# Patient Record
Sex: MALE | Race: WHITE | Employment: FULL TIME | URBAN - NONMETROPOLITAN AREA
[De-identification: names, ages, dates, MRNs, and addresses within clinical notes are randomized per-mention and may not be internally consistent; named-entity substitution may affect disease eponyms.]

---

## 2019-03-27 ENCOUNTER — APPOINTMENT (OUTPATIENT)
Dept: CT IMAGING | Age: 54
End: 2019-03-27
Payer: COMMERCIAL

## 2019-03-27 ENCOUNTER — HOSPITAL ENCOUNTER (EMERGENCY)
Age: 54
Discharge: ANOTHER HOSPITAL | End: 2019-03-28
Attending: EMERGENCY MEDICINE
Payer: COMMERCIAL

## 2019-03-27 DIAGNOSIS — G45.9 TIA (TRANSIENT ISCHEMIC ATTACK): Primary | ICD-10-CM

## 2019-03-27 LAB
ABSOLUTE EOS #: 0.13 K/UL (ref 0–0.44)
ABSOLUTE IMMATURE GRANULOCYTE: 0.03 K/UL (ref 0–0.3)
ABSOLUTE LYMPH #: 2.22 K/UL (ref 1.1–3.7)
ABSOLUTE MONO #: 0.53 K/UL (ref 0.1–1.2)
ALBUMIN SERPL-MCNC: 4.4 G/DL (ref 3.5–5.2)
ALBUMIN/GLOBULIN RATIO: 1.3 (ref 1–2.5)
ALP BLD-CCNC: 73 U/L (ref 40–129)
ALT SERPL-CCNC: 23 U/L (ref 5–41)
ANION GAP SERPL CALCULATED.3IONS-SCNC: 11 MMOL/L (ref 9–17)
AST SERPL-CCNC: 17 U/L
BASOPHILS # BLD: 1 % (ref 0–2)
BASOPHILS ABSOLUTE: 0.04 K/UL (ref 0–0.2)
BILIRUB SERPL-MCNC: 0.25 MG/DL (ref 0.3–1.2)
BUN BLDV-MCNC: 22 MG/DL (ref 6–20)
BUN/CREAT BLD: 24 (ref 9–20)
CALCIUM SERPL-MCNC: 10 MG/DL (ref 8.6–10.4)
CHLORIDE BLD-SCNC: 93 MMOL/L (ref 98–107)
CO2: 29 MMOL/L (ref 20–31)
CREAT SERPL-MCNC: 0.91 MG/DL (ref 0.7–1.2)
DIFFERENTIAL TYPE: NORMAL
EOSINOPHILS RELATIVE PERCENT: 2 % (ref 1–4)
GFR AFRICAN AMERICAN: >60 ML/MIN
GFR NON-AFRICAN AMERICAN: >60 ML/MIN
GFR SERPL CREATININE-BSD FRML MDRD: ABNORMAL ML/MIN/{1.73_M2}
GFR SERPL CREATININE-BSD FRML MDRD: ABNORMAL ML/MIN/{1.73_M2}
GLUCOSE BLD-MCNC: 206 MG/DL (ref 70–99)
HCT VFR BLD CALC: 43.3 % (ref 40.7–50.3)
HEMOGLOBIN: 14 G/DL (ref 13–17)
IMMATURE GRANULOCYTES: 0 %
LYMPHOCYTES # BLD: 30 % (ref 24–43)
MCH RBC QN AUTO: 29.4 PG (ref 25.2–33.5)
MCHC RBC AUTO-ENTMCNC: 32.3 G/DL (ref 28.4–34.8)
MCV RBC AUTO: 90.8 FL (ref 82.6–102.9)
MONOCYTES # BLD: 7 % (ref 3–12)
NRBC AUTOMATED: 0 PER 100 WBC
PDW BLD-RTO: 12.5 % (ref 11.8–14.4)
PLATELET # BLD: 225 K/UL (ref 138–453)
PLATELET ESTIMATE: NORMAL
PMV BLD AUTO: 10.5 FL (ref 8.1–13.5)
POTASSIUM SERPL-SCNC: 4.6 MMOL/L (ref 3.7–5.3)
RBC # BLD: 4.77 M/UL (ref 4.21–5.77)
RBC # BLD: NORMAL 10*6/UL
SEG NEUTROPHILS: 60 % (ref 36–65)
SEGMENTED NEUTROPHILS ABSOLUTE COUNT: 4.56 K/UL (ref 1.5–8.1)
SODIUM BLD-SCNC: 133 MMOL/L (ref 135–144)
TOTAL PROTEIN: 7.7 G/DL (ref 6.4–8.3)
TROPONIN INTERP: NORMAL
TROPONIN INTERP: NORMAL
TROPONIN T: <0.03 NG/ML
TROPONIN T: <0.03 NG/ML
TROPONIN, HIGH SENSITIVITY: NORMAL NG/L (ref 0–22)
TROPONIN, HIGH SENSITIVITY: NORMAL NG/L (ref 0–22)
WBC # BLD: 7.5 K/UL (ref 3.5–11.3)
WBC # BLD: NORMAL 10*3/UL

## 2019-03-27 PROCEDURE — 80053 COMPREHEN METABOLIC PANEL: CPT

## 2019-03-27 PROCEDURE — 6360000004 HC RX CONTRAST MEDICATION: Performed by: EMERGENCY MEDICINE

## 2019-03-27 PROCEDURE — 99285 EMERGENCY DEPT VISIT HI MDM: CPT

## 2019-03-27 PROCEDURE — 36415 COLL VENOUS BLD VENIPUNCTURE: CPT

## 2019-03-27 PROCEDURE — 70496 CT ANGIOGRAPHY HEAD: CPT

## 2019-03-27 PROCEDURE — 84484 ASSAY OF TROPONIN QUANT: CPT

## 2019-03-27 PROCEDURE — 93005 ELECTROCARDIOGRAM TRACING: CPT

## 2019-03-27 PROCEDURE — 85025 COMPLETE CBC W/AUTO DIFF WBC: CPT

## 2019-03-27 PROCEDURE — 6370000000 HC RX 637 (ALT 250 FOR IP): Performed by: EMERGENCY MEDICINE

## 2019-03-27 PROCEDURE — 70498 CT ANGIOGRAPHY NECK: CPT

## 2019-03-27 RX ORDER — ASPIRIN 81 MG/1
324 TABLET, CHEWABLE ORAL ONCE
Status: COMPLETED | OUTPATIENT
Start: 2019-03-27 | End: 2019-03-27

## 2019-03-27 RX ORDER — METOPROLOL TARTRATE 100 MG/1
100 TABLET ORAL 2 TIMES DAILY
COMMUNITY

## 2019-03-27 RX ADMIN — ASPIRIN 81 MG 324 MG: 81 TABLET ORAL at 23:24

## 2019-03-27 RX ADMIN — IOPAMIDOL 75 ML: 755 INJECTION, SOLUTION INTRAVENOUS at 18:58

## 2019-03-27 ASSESSMENT — ENCOUNTER SYMPTOMS
BLOOD IN STOOL: 0
ABDOMINAL PAIN: 0
SHORTNESS OF BREATH: 0
PHOTOPHOBIA: 0
VOICE CHANGE: 0
WHEEZING: 0
NAUSEA: 0
COUGH: 0
TROUBLE SWALLOWING: 0
SORE THROAT: 0
DIARRHEA: 0
CHEST TIGHTNESS: 0
BACK PAIN: 0
VOMITING: 0
FACIAL SWELLING: 0

## 2019-03-28 VITALS
DIASTOLIC BLOOD PRESSURE: 65 MMHG | WEIGHT: 220 LBS | BODY MASS INDEX: 30.8 KG/M2 | OXYGEN SATURATION: 96 % | RESPIRATION RATE: 16 BRPM | HEIGHT: 71 IN | TEMPERATURE: 98.2 F | HEART RATE: 67 BPM | SYSTOLIC BLOOD PRESSURE: 106 MMHG

## 2019-03-29 LAB
EKG ATRIAL RATE: 72 BPM
EKG ATRIAL RATE: 75 BPM
EKG P AXIS: 59 DEGREES
EKG P AXIS: 63 DEGREES
EKG P-R INTERVAL: 156 MS
EKG P-R INTERVAL: 160 MS
EKG Q-T INTERVAL: 384 MS
EKG Q-T INTERVAL: 384 MS
EKG QRS DURATION: 92 MS
EKG QRS DURATION: 96 MS
EKG QTC CALCULATION (BAZETT): 420 MS
EKG QTC CALCULATION (BAZETT): 428 MS
EKG R AXIS: -21 DEGREES
EKG R AXIS: -21 DEGREES
EKG T AXIS: 10 DEGREES
EKG T AXIS: 11 DEGREES
EKG VENTRICULAR RATE: 72 BPM
EKG VENTRICULAR RATE: 75 BPM

## 2023-08-05 ENCOUNTER — APPOINTMENT (OUTPATIENT)
Dept: CT IMAGING | Age: 58
DRG: 469 | End: 2023-08-05
Payer: COMMERCIAL

## 2023-08-05 ENCOUNTER — HOSPITAL ENCOUNTER (INPATIENT)
Age: 58
LOS: 2 days | Discharge: HOME | DRG: 469 | End: 2023-08-07
Attending: EMERGENCY MEDICINE | Admitting: STUDENT IN AN ORGANIZED HEALTH CARE EDUCATION/TRAINING PROGRAM
Payer: COMMERCIAL

## 2023-08-05 ENCOUNTER — APPOINTMENT (OUTPATIENT)
Dept: GENERAL RADIOLOGY | Age: 58
DRG: 469 | End: 2023-08-05
Payer: COMMERCIAL

## 2023-08-05 DIAGNOSIS — E11.10 LACTIC ACIDOSIS DUE TO DIABETES MELLITUS (HCC): ICD-10-CM

## 2023-08-05 DIAGNOSIS — E11.65 HYPERGLYCEMIA DUE TO DIABETES MELLITUS (HCC): Primary | ICD-10-CM

## 2023-08-05 DIAGNOSIS — R79.89 ELEVATED BLOOD KETONE BODY LEVEL: ICD-10-CM

## 2023-08-05 DIAGNOSIS — N17.9 AKI (ACUTE KIDNEY INJURY) (HCC): ICD-10-CM

## 2023-08-05 DIAGNOSIS — E11.65 TYPE 2 DIABETES MELLITUS WITH HYPERGLYCEMIA (HCC): ICD-10-CM

## 2023-08-05 PROBLEM — R78.89 ELEVATED BETA-HYDROXYBUTYRATE: Status: ACTIVE | Noted: 2023-08-05

## 2023-08-05 PROBLEM — R73.9 HYPERGLYCEMIA: Status: ACTIVE | Noted: 2023-08-05

## 2023-08-05 PROBLEM — E86.0 DEHYDRATION: Status: ACTIVE | Noted: 2023-08-05

## 2023-08-05 PROBLEM — R11.0 NAUSEA: Status: ACTIVE | Noted: 2023-08-05

## 2023-08-05 PROBLEM — R10.9 INTRACTABLE ABDOMINAL PAIN: Status: ACTIVE | Noted: 2023-08-05

## 2023-08-05 LAB
ALBUMIN SERPL-MCNC: 4.2 G/DL (ref 3.5–5.2)
ALBUMIN/GLOB SERPL: 1.1 {RATIO} (ref 1–2.5)
ALP SERPL-CCNC: 110 U/L (ref 40–129)
ALT SERPL-CCNC: 49 U/L (ref 5–41)
ANION GAP SERPL CALCULATED.3IONS-SCNC: 16 MMOL/L (ref 9–17)
AST SERPL-CCNC: 23 U/L
B-OH-BUTYR SERPL-MCNC: 0.98 MMOL/L (ref 0.02–0.27)
BACTERIA URNS QL MICRO: ABNORMAL
BASOPHILS # BLD: 0.07 K/UL (ref 0–0.2)
BASOPHILS NFR BLD: 1 % (ref 0–2)
BILIRUB SERPL-MCNC: 0.7 MG/DL (ref 0.3–1.2)
BILIRUB UR QL STRIP: ABNORMAL
BUN SERPL-MCNC: 29 MG/DL (ref 8–23)
BUN/CREAT SERPL: 19 (ref 9–20)
CALCIUM SERPL-MCNC: 9.8 MG/DL (ref 8.6–10.4)
CASTS #/AREA URNS LPF: ABNORMAL /LPF
CHARACTER UR: ABNORMAL
CHLORIDE SERPL-SCNC: 93 MMOL/L (ref 98–107)
CHP ED QC CHECK: NORMAL
CHP ED QC CHECK: YES
CLARITY UR: CLEAR
CO2 SERPL-SCNC: 22 MMOL/L (ref 20–31)
COLOR UR: YELLOW
CREAT SERPL-MCNC: 1.5 MG/DL (ref 0.7–1.2)
EOSINOPHIL # BLD: <0.03 K/UL (ref 0–0.44)
EOSINOPHILS RELATIVE PERCENT: 0 % (ref 1–4)
EPI CELLS #/AREA URNS HPF: ABNORMAL /HPF (ref 0–5)
ERYTHROCYTE [DISTWIDTH] IN BLOOD BY AUTOMATED COUNT: 13 % (ref 11.8–14.4)
GFR SERPL CREATININE-BSD FRML MDRD: 53 ML/MIN/1.73M2
GLUCOSE BLD-MCNC: 223 MG/DL (ref 74–100)
GLUCOSE BLD-MCNC: 241 MG/DL
GLUCOSE BLD-MCNC: 241 MG/DL (ref 74–100)
GLUCOSE BLD-MCNC: 356 MG/DL
GLUCOSE BLD-MCNC: 356 MG/DL (ref 74–100)
GLUCOSE SERPL-MCNC: 331 MG/DL (ref 70–99)
GLUCOSE UR STRIP-MCNC: ABNORMAL MG/DL
HCT VFR BLD AUTO: 49.7 % (ref 40.7–50.3)
HGB BLD-MCNC: 16.1 G/DL (ref 13–17)
HGB UR QL STRIP.AUTO: NEGATIVE
IMM GRANULOCYTES # BLD AUTO: 0.07 K/UL (ref 0–0.3)
IMM GRANULOCYTES NFR BLD: 1 %
KETONES UR STRIP-MCNC: ABNORMAL MG/DL
LACTATE BLDV-SCNC: 1.5 MMOL/L (ref 0.5–2.2)
LACTATE BLDV-SCNC: 3 MMOL/L (ref 0.5–2.2)
LEUKOCYTE ESTERASE UR QL STRIP: NEGATIVE
LIPASE SERPL-CCNC: 38 U/L (ref 13–60)
LYMPHOCYTES NFR BLD: 2.37 K/UL (ref 1.1–3.7)
LYMPHOCYTES RELATIVE PERCENT: 19 % (ref 24–43)
MCH RBC QN AUTO: 28.1 PG (ref 25.2–33.5)
MCHC RBC AUTO-ENTMCNC: 32.4 G/DL (ref 28.4–34.8)
MCV RBC AUTO: 86.7 FL (ref 82.6–102.9)
MONOCYTES NFR BLD: 0.7 K/UL (ref 0.1–1.2)
MONOCYTES NFR BLD: 6 % (ref 3–12)
MUCOUS THREADS URNS QL MICRO: ABNORMAL
NEUTROPHILS NFR BLD: 73 % (ref 36–65)
NEUTS SEG NFR BLD: 9.16 K/UL (ref 1.5–8.1)
NITRITE UR QL STRIP: NEGATIVE
NRBC BLD-RTO: 0 PER 100 WBC
PH UR STRIP: 5.5 [PH] (ref 5–9)
PLATELET # BLD AUTO: 232 K/UL (ref 138–453)
PMV BLD AUTO: 11 FL (ref 8.1–13.5)
POTASSIUM SERPL-SCNC: 4.3 MMOL/L (ref 3.7–5.3)
PROT SERPL-MCNC: 7.9 G/DL (ref 6.4–8.3)
PROT UR STRIP-MCNC: ABNORMAL MG/DL
RBC # BLD AUTO: 5.73 M/UL (ref 4.21–5.77)
RBC #/AREA URNS HPF: ABNORMAL /HPF (ref 0–2)
SARS-COV-2 RDRP RESP QL NAA+PROBE: NOT DETECTED
SODIUM SERPL-SCNC: 131 MMOL/L (ref 135–144)
SP GR UR STRIP: >1.03 (ref 1.01–1.02)
SPECIMEN DESCRIPTION: NORMAL
TSH SERPL DL<=0.05 MIU/L-ACNC: 2.86 UIU/ML (ref 0.3–5)
UROBILINOGEN UR STRIP-ACNC: NORMAL EU/DL (ref 0–1)
WBC #/AREA URNS HPF: ABNORMAL /HPF (ref 0–5)
WBC OTHER # BLD: 12.4 K/UL (ref 3.5–11.3)

## 2023-08-05 PROCEDURE — 93005 ELECTROCARDIOGRAM TRACING: CPT | Performed by: EMERGENCY MEDICINE

## 2023-08-05 PROCEDURE — 99285 EMERGENCY DEPT VISIT HI MDM: CPT

## 2023-08-05 PROCEDURE — 83036 HEMOGLOBIN GLYCOSYLATED A1C: CPT

## 2023-08-05 PROCEDURE — 2580000003 HC RX 258: Performed by: STUDENT IN AN ORGANIZED HEALTH CARE EDUCATION/TRAINING PROGRAM

## 2023-08-05 PROCEDURE — 87635 SARS-COV-2 COVID-19 AMP PRB: CPT

## 2023-08-05 PROCEDURE — 96374 THER/PROPH/DIAG INJ IV PUSH: CPT

## 2023-08-05 PROCEDURE — 6360000002 HC RX W HCPCS: Performed by: EMERGENCY MEDICINE

## 2023-08-05 PROCEDURE — 2580000003 HC RX 258: Performed by: EMERGENCY MEDICINE

## 2023-08-05 PROCEDURE — 96361 HYDRATE IV INFUSION ADD-ON: CPT

## 2023-08-05 PROCEDURE — 1200000000 HC SEMI PRIVATE

## 2023-08-05 PROCEDURE — 84443 ASSAY THYROID STIM HORMONE: CPT

## 2023-08-05 PROCEDURE — 36415 COLL VENOUS BLD VENIPUNCTURE: CPT

## 2023-08-05 PROCEDURE — 94761 N-INVAS EAR/PLS OXIMETRY MLT: CPT

## 2023-08-05 PROCEDURE — 81001 URINALYSIS AUTO W/SCOPE: CPT

## 2023-08-05 PROCEDURE — 74176 CT ABD & PELVIS W/O CONTRAST: CPT

## 2023-08-05 PROCEDURE — 87086 URINE CULTURE/COLONY COUNT: CPT

## 2023-08-05 PROCEDURE — 80053 COMPREHEN METABOLIC PANEL: CPT

## 2023-08-05 PROCEDURE — 82947 ASSAY GLUCOSE BLOOD QUANT: CPT

## 2023-08-05 PROCEDURE — 6360000002 HC RX W HCPCS: Performed by: STUDENT IN AN ORGANIZED HEALTH CARE EDUCATION/TRAINING PROGRAM

## 2023-08-05 PROCEDURE — 85025 COMPLETE CBC W/AUTO DIFF WBC: CPT

## 2023-08-05 PROCEDURE — 71045 X-RAY EXAM CHEST 1 VIEW: CPT

## 2023-08-05 PROCEDURE — 83690 ASSAY OF LIPASE: CPT

## 2023-08-05 PROCEDURE — 83605 ASSAY OF LACTIC ACID: CPT

## 2023-08-05 PROCEDURE — 82010 KETONE BODYS QUAN: CPT

## 2023-08-05 PROCEDURE — 6370000000 HC RX 637 (ALT 250 FOR IP): Performed by: STUDENT IN AN ORGANIZED HEALTH CARE EDUCATION/TRAINING PROGRAM

## 2023-08-05 RX ORDER — TAMSULOSIN HYDROCHLORIDE 0.4 MG/1
0.4 CAPSULE ORAL DAILY
Status: DISCONTINUED | OUTPATIENT
Start: 2023-08-05 | End: 2023-08-07 | Stop reason: HOSPADM

## 2023-08-05 RX ORDER — INSULIN LISPRO 100 [IU]/ML
0-4 INJECTION, SOLUTION INTRAVENOUS; SUBCUTANEOUS NIGHTLY
Status: DISCONTINUED | OUTPATIENT
Start: 2023-08-05 | End: 2023-08-07 | Stop reason: HOSPADM

## 2023-08-05 RX ORDER — ONDANSETRON 2 MG/ML
4 INJECTION INTRAMUSCULAR; INTRAVENOUS EVERY 6 HOURS PRN
Status: DISCONTINUED | OUTPATIENT
Start: 2023-08-05 | End: 2023-08-07 | Stop reason: HOSPADM

## 2023-08-05 RX ORDER — POTASSIUM CHLORIDE 7.45 MG/ML
10 INJECTION INTRAVENOUS PRN
Status: DISCONTINUED | OUTPATIENT
Start: 2023-08-05 | End: 2023-08-07 | Stop reason: HOSPADM

## 2023-08-05 RX ORDER — INSULIN GLARGINE 100 [IU]/ML
10 INJECTION, SOLUTION SUBCUTANEOUS NIGHTLY
Status: DISCONTINUED | OUTPATIENT
Start: 2023-08-05 | End: 2023-08-06

## 2023-08-05 RX ORDER — ONDANSETRON 4 MG/1
4 TABLET, ORALLY DISINTEGRATING ORAL EVERY 8 HOURS PRN
Status: DISCONTINUED | OUTPATIENT
Start: 2023-08-05 | End: 2023-08-07 | Stop reason: HOSPADM

## 2023-08-05 RX ORDER — MAGNESIUM SULFATE IN WATER 40 MG/ML
2000 INJECTION, SOLUTION INTRAVENOUS PRN
Status: DISCONTINUED | OUTPATIENT
Start: 2023-08-05 | End: 2023-08-07 | Stop reason: HOSPADM

## 2023-08-05 RX ORDER — ONDANSETRON 2 MG/ML
4 INJECTION INTRAMUSCULAR; INTRAVENOUS ONCE
Status: COMPLETED | OUTPATIENT
Start: 2023-08-05 | End: 2023-08-05

## 2023-08-05 RX ORDER — SODIUM CHLORIDE 9 MG/ML
INJECTION, SOLUTION INTRAVENOUS CONTINUOUS
Status: DISCONTINUED | OUTPATIENT
Start: 2023-08-05 | End: 2023-08-07

## 2023-08-05 RX ORDER — FAMOTIDINE 20 MG/1
20 TABLET, FILM COATED ORAL 2 TIMES DAILY
Status: DISCONTINUED | OUTPATIENT
Start: 2023-08-05 | End: 2023-08-07 | Stop reason: HOSPADM

## 2023-08-05 RX ORDER — INSULIN LISPRO 100 [IU]/ML
0-8 INJECTION, SOLUTION INTRAVENOUS; SUBCUTANEOUS
Status: DISCONTINUED | OUTPATIENT
Start: 2023-08-06 | End: 2023-08-07 | Stop reason: HOSPADM

## 2023-08-05 RX ORDER — SODIUM CHLORIDE 0.9 % (FLUSH) 0.9 %
10 SYRINGE (ML) INJECTION EVERY 12 HOURS SCHEDULED
Status: DISCONTINUED | OUTPATIENT
Start: 2023-08-05 | End: 2023-08-07 | Stop reason: HOSPADM

## 2023-08-05 RX ORDER — DEXTROSE MONOHYDRATE 100 MG/ML
INJECTION, SOLUTION INTRAVENOUS CONTINUOUS PRN
Status: DISCONTINUED | OUTPATIENT
Start: 2023-08-05 | End: 2023-08-07 | Stop reason: HOSPADM

## 2023-08-05 RX ORDER — ENOXAPARIN SODIUM 100 MG/ML
30 INJECTION SUBCUTANEOUS 2 TIMES DAILY
Status: DISCONTINUED | OUTPATIENT
Start: 2023-08-05 | End: 2023-08-07 | Stop reason: HOSPADM

## 2023-08-05 RX ORDER — SODIUM CHLORIDE 9 MG/ML
INJECTION, SOLUTION INTRAVENOUS PRN
Status: DISCONTINUED | OUTPATIENT
Start: 2023-08-05 | End: 2023-08-07 | Stop reason: HOSPADM

## 2023-08-05 RX ORDER — 0.9 % SODIUM CHLORIDE 0.9 %
1000 INTRAVENOUS SOLUTION INTRAVENOUS ONCE
Status: COMPLETED | OUTPATIENT
Start: 2023-08-05 | End: 2023-08-05

## 2023-08-05 RX ORDER — POTASSIUM CHLORIDE 20 MEQ/1
40 TABLET, EXTENDED RELEASE ORAL PRN
Status: DISCONTINUED | OUTPATIENT
Start: 2023-08-05 | End: 2023-08-07 | Stop reason: HOSPADM

## 2023-08-05 RX ORDER — ACETAMINOPHEN 650 MG/1
650 SUPPOSITORY RECTAL EVERY 6 HOURS PRN
Status: DISCONTINUED | OUTPATIENT
Start: 2023-08-05 | End: 2023-08-07 | Stop reason: HOSPADM

## 2023-08-05 RX ORDER — POLYETHYLENE GLYCOL 3350 17 G/17G
17 POWDER, FOR SOLUTION ORAL DAILY PRN
Status: DISCONTINUED | OUTPATIENT
Start: 2023-08-05 | End: 2023-08-07 | Stop reason: HOSPADM

## 2023-08-05 RX ORDER — METOPROLOL TARTRATE 100 MG/1
100 TABLET ORAL 2 TIMES DAILY
Status: DISCONTINUED | OUTPATIENT
Start: 2023-08-05 | End: 2023-08-06

## 2023-08-05 RX ORDER — SODIUM CHLORIDE 0.9 % (FLUSH) 0.9 %
10 SYRINGE (ML) INJECTION PRN
Status: DISCONTINUED | OUTPATIENT
Start: 2023-08-05 | End: 2023-08-07 | Stop reason: HOSPADM

## 2023-08-05 RX ORDER — ACETAMINOPHEN 325 MG/1
650 TABLET ORAL EVERY 6 HOURS PRN
Status: DISCONTINUED | OUTPATIENT
Start: 2023-08-05 | End: 2023-08-07 | Stop reason: HOSPADM

## 2023-08-05 RX ADMIN — SODIUM CHLORIDE 1000 ML: 9 INJECTION, SOLUTION INTRAVENOUS at 18:36

## 2023-08-05 RX ADMIN — SODIUM CHLORIDE 1000 ML: 9 INJECTION, SOLUTION INTRAVENOUS at 16:42

## 2023-08-05 RX ADMIN — METOPROLOL TARTRATE 100 MG: 100 TABLET, FILM COATED ORAL at 20:58

## 2023-08-05 RX ADMIN — TAMSULOSIN HYDROCHLORIDE 0.4 MG: 0.4 CAPSULE ORAL at 20:59

## 2023-08-05 RX ADMIN — SODIUM CHLORIDE: 9 INJECTION, SOLUTION INTRAVENOUS at 20:24

## 2023-08-05 RX ADMIN — FAMOTIDINE 20 MG: 20 TABLET, FILM COATED ORAL at 20:59

## 2023-08-05 RX ADMIN — ACETAMINOPHEN 650 MG: 325 TABLET ORAL at 21:02

## 2023-08-05 RX ADMIN — ONDANSETRON 4 MG: 2 INJECTION INTRAMUSCULAR; INTRAVENOUS at 16:42

## 2023-08-05 RX ADMIN — SODIUM CHLORIDE, PRESERVATIVE FREE 10 ML: 5 INJECTION INTRAVENOUS at 20:59

## 2023-08-05 RX ADMIN — INSULIN GLARGINE 10 UNITS: 100 INJECTION, SOLUTION SUBCUTANEOUS at 20:58

## 2023-08-05 RX ADMIN — ENOXAPARIN SODIUM 30 MG: 100 INJECTION SUBCUTANEOUS at 20:58

## 2023-08-05 ASSESSMENT — PAIN DESCRIPTION - LOCATION: LOCATION: HEAD

## 2023-08-05 ASSESSMENT — PAIN DESCRIPTION - DESCRIPTORS: DESCRIPTORS: ACHING

## 2023-08-05 ASSESSMENT — PAIN SCALES - GENERAL: PAINLEVEL_OUTOF10: 5

## 2023-08-05 NOTE — ED PROVIDER NOTES
[address] [facility] ED  Emergency Department  Emergency Medicine Sign-out     Care of Martín Santamaria was assumed from Dr. Jaclyn Solis and is being seen for Abdominal Pain (RUQ and LUQ, onset \"last couple of days\"), Diarrhea (On/off chronic ), and Emesis (X1 yesterday)  . The patient's initial evaluation and plan have been discussed with the prior attending who initially evaluated the patient.      EMERGENCY DEPARTMENT COURSE / MEDICAL DECISION MAKING:       MEDICATIONS GIVEN:  Orders Placed This Encounter   Medications    sodium chloride 0.9 % bolus 1,000 mL    ondansetron (ZOFRAN) injection 4 mg    sodium chloride 0.9 % bolus 1,000 mL       LABS / RADIOLOGY:     Labs Reviewed   GLUCOSE, WHOLE BLOOD - Abnormal; Notable for the following components:       Result Value    POC Glucose 356 (*)     All other components within normal limits   LACTIC ACID - Abnormal; Notable for the following components:    Lactic Acid 3.0 (*)     All other components within normal limits   CBC WITH AUTO DIFFERENTIAL - Abnormal; Notable for the following components:    WBC 12.4 (*)     Neutrophils % 73 (*)     Lymphocytes % 19 (*)     Eosinophils % 0 (*)     Immature Granulocytes 1 (*)     Neutrophils Absolute 9.16 (*)     All other components within normal limits   COMPREHENSIVE METABOLIC PANEL - Abnormal; Notable for the following components:    Glucose 331 (*)     BUN 29 (*)     Creatinine 1.5 (*)     Est, Glom Filt Rate 53 (*)     Sodium 131 (*)     Chloride 93 (*)     ALT 49 (*)     All other components within normal limits   URINALYSIS - Abnormal; Notable for the following components:    Glucose, Ur 2+ (*)     Bilirubin Urine MODERATE (*)     Ketones, Urine 1+ (*)     Specific Gravity, UA >1.030 (*)     Protein, UA 1+ (*)     All other components within normal limits   BETA-HYDROXYBUTYRATE - Abnormal; Notable for the following components:    Beta-Hydroxybutyrate 0.98 (*)     All other components within normal limits   MICROSCOPIC
73(!)   Lymphocyte % 19(!)   Monocytes % 6   Eosinophils % 0(!)   Basophils % 1   Immature Granulocytes 1(!)   Neutrophils Absolute 9.16(!)   Lymphocytes Absolute 2.37   Monocytes Absolute 0.70   Eosinophils Absolute <0.03   Basophils Absolute 0.07   Absolute Immature Granulocyte 0.07  WBC count 12.4/hemoglobin 16.1 [RS]      ED Course User Index  [RS] Zoila Oro MD             All questions answered and addressed                CRITICAL CARE TIME   Total Critical Care time was minutes, excluding separately reportable procedures. There was a high probability of clinically significant/life threatening deterioration in the patient's condition which required my urgent intervention. PROCEDURES:    Procedures       ED BEDSIDE ULTRASOUND:   Performed by ED Physician - none      FINAL IMPRESSION   No diagnosis found. DISPOSITION/PLAN   DISPOSITION        PATIENT REFERRED TO:  No follow-up provider specified. DISCHARGE MEDICATIONS:  New Prescriptions    No medications on file     Controlled Substances Monitoring:     No flowsheet data found.     (Please note that portions of this note were completed with a voice recognition program.  Efforts were made to edit the dictations but occasionally words are mis-transcribed.)    Zoila Oro MD (electronically signed)  Attending Emergency Physician            Zoila Oro MD  08/05/23 [id number]

## 2023-08-06 PROBLEM — G47.30 SLEEP APNEA: Status: ACTIVE | Noted: 2023-08-06

## 2023-08-06 LAB
ALBUMIN SERPL-MCNC: 3.4 G/DL (ref 3.5–5.2)
ALBUMIN/GLOB SERPL: 1.2 {RATIO} (ref 1–2.5)
ALP SERPL-CCNC: 88 U/L (ref 40–129)
ALT SERPL-CCNC: 33 U/L (ref 5–41)
ANION GAP SERPL CALCULATED.3IONS-SCNC: 10 MMOL/L (ref 9–17)
AST SERPL-CCNC: 14 U/L
BASOPHILS # BLD: 0.07 K/UL (ref 0–0.2)
BASOPHILS NFR BLD: 1 % (ref 0–2)
BILIRUB SERPL-MCNC: 0.4 MG/DL (ref 0.3–1.2)
BUN SERPL-MCNC: 18 MG/DL (ref 8–23)
BUN/CREAT SERPL: 20 (ref 9–20)
CALCIUM SERPL-MCNC: 8.5 MG/DL (ref 8.6–10.4)
CHLORIDE SERPL-SCNC: 105 MMOL/L (ref 98–107)
CHOLEST SERPL-MCNC: 168 MG/DL
CHOLESTEROL/HDL RATIO: 6.5
CO2 SERPL-SCNC: 24 MMOL/L (ref 20–31)
CREAT SERPL-MCNC: 0.9 MG/DL (ref 0.7–1.2)
EKG ATRIAL RATE: 103 BPM
EKG P AXIS: 16 DEGREES
EKG P-R INTERVAL: 144 MS
EKG Q-T INTERVAL: 354 MS
EKG QRS DURATION: 100 MS
EKG QTC CALCULATION (BAZETT): 463 MS
EKG R AXIS: 83 DEGREES
EKG T AXIS: 30 DEGREES
EKG VENTRICULAR RATE: 103 BPM
EOSINOPHIL # BLD: 0.07 K/UL (ref 0–0.44)
EOSINOPHILS RELATIVE PERCENT: 1 % (ref 1–4)
ERYTHROCYTE [DISTWIDTH] IN BLOOD BY AUTOMATED COUNT: 12.9 % (ref 11.8–14.4)
GFR SERPL CREATININE-BSD FRML MDRD: >60 ML/MIN/1.73M2
GLUCOSE BLD-MCNC: 222 MG/DL (ref 74–100)
GLUCOSE BLD-MCNC: 233 MG/DL (ref 74–100)
GLUCOSE BLD-MCNC: 251 MG/DL (ref 74–100)
GLUCOSE BLD-MCNC: 255 MG/DL (ref 74–100)
GLUCOSE SERPL-MCNC: 252 MG/DL (ref 70–99)
HCT VFR BLD AUTO: 38.9 % (ref 40.7–50.3)
HDLC SERPL-MCNC: 26 MG/DL
HGB BLD-MCNC: 12.5 G/DL (ref 13–17)
IMM GRANULOCYTES # BLD AUTO: 0 K/UL (ref 0–0.3)
IMM GRANULOCYTES NFR BLD: 0 %
LDLC SERPL CALC-MCNC: 104 MG/DL (ref 0–130)
LYMPHOCYTES NFR BLD: 2.59 K/UL (ref 1.1–3.7)
LYMPHOCYTES RELATIVE PERCENT: 35 % (ref 24–43)
MCH RBC QN AUTO: 28.3 PG (ref 25.2–33.5)
MCHC RBC AUTO-ENTMCNC: 32.1 G/DL (ref 28.4–34.8)
MCV RBC AUTO: 88.2 FL (ref 82.6–102.9)
MONOCYTES NFR BLD: 0.59 K/UL (ref 0.1–1.2)
MONOCYTES NFR BLD: 8 % (ref 3–12)
MORPHOLOGY: ABNORMAL
NEUTROPHILS NFR BLD: 55 % (ref 36–65)
NEUTS SEG NFR BLD: 4.08 K/UL (ref 1.5–8.1)
NRBC BLD-RTO: 0 PER 100 WBC
PLATELET # BLD AUTO: 132 K/UL (ref 138–453)
PMV BLD AUTO: 11.1 FL (ref 8.1–13.5)
POTASSIUM SERPL-SCNC: 4.1 MMOL/L (ref 3.7–5.3)
PROT SERPL-MCNC: 6.3 G/DL (ref 6.4–8.3)
PSA SERPL-MCNC: 1.42 NG/ML
RBC # BLD AUTO: 4.41 M/UL (ref 4.21–5.77)
SODIUM SERPL-SCNC: 139 MMOL/L (ref 135–144)
TRIGL SERPL-MCNC: 190 MG/DL
WBC OTHER # BLD: 7.4 K/UL (ref 3.5–11.3)

## 2023-08-06 PROCEDURE — 93010 ELECTROCARDIOGRAM REPORT: CPT | Performed by: INTERNAL MEDICINE

## 2023-08-06 PROCEDURE — 94761 N-INVAS EAR/PLS OXIMETRY MLT: CPT

## 2023-08-06 PROCEDURE — 1200000000 HC SEMI PRIVATE

## 2023-08-06 PROCEDURE — 97166 OT EVAL MOD COMPLEX 45 MIN: CPT

## 2023-08-06 PROCEDURE — 80061 LIPID PANEL: CPT

## 2023-08-06 PROCEDURE — 6370000000 HC RX 637 (ALT 250 FOR IP): Performed by: STUDENT IN AN ORGANIZED HEALTH CARE EDUCATION/TRAINING PROGRAM

## 2023-08-06 PROCEDURE — 36415 COLL VENOUS BLD VENIPUNCTURE: CPT

## 2023-08-06 PROCEDURE — 85025 COMPLETE CBC W/AUTO DIFF WBC: CPT

## 2023-08-06 PROCEDURE — 80053 COMPREHEN METABOLIC PANEL: CPT

## 2023-08-06 PROCEDURE — 6360000002 HC RX W HCPCS: Performed by: STUDENT IN AN ORGANIZED HEALTH CARE EDUCATION/TRAINING PROGRAM

## 2023-08-06 PROCEDURE — 97162 PT EVAL MOD COMPLEX 30 MIN: CPT

## 2023-08-06 PROCEDURE — 82947 ASSAY GLUCOSE BLOOD QUANT: CPT

## 2023-08-06 PROCEDURE — G0103 PSA SCREENING: HCPCS

## 2023-08-06 PROCEDURE — 2580000003 HC RX 258: Performed by: STUDENT IN AN ORGANIZED HEALTH CARE EDUCATION/TRAINING PROGRAM

## 2023-08-06 RX ORDER — GABAPENTIN 400 MG/1
800 CAPSULE ORAL 3 TIMES DAILY
Status: DISCONTINUED | OUTPATIENT
Start: 2023-08-06 | End: 2023-08-06

## 2023-08-06 RX ORDER — INSULIN GLARGINE 100 [IU]/ML
18 INJECTION, SOLUTION SUBCUTANEOUS NIGHTLY
Status: DISCONTINUED | OUTPATIENT
Start: 2023-08-06 | End: 2023-08-07 | Stop reason: HOSPADM

## 2023-08-06 RX ORDER — GABAPENTIN 300 MG/1
600 CAPSULE ORAL 3 TIMES DAILY
Status: DISCONTINUED | OUTPATIENT
Start: 2023-08-06 | End: 2023-08-07 | Stop reason: HOSPADM

## 2023-08-06 RX ORDER — ATORVASTATIN CALCIUM 40 MG/1
80 TABLET, FILM COATED ORAL NIGHTLY
Status: DISCONTINUED | OUTPATIENT
Start: 2023-08-06 | End: 2023-08-07 | Stop reason: HOSPADM

## 2023-08-06 RX ORDER — MIRTAZAPINE 15 MG/1
30 TABLET, FILM COATED ORAL NIGHTLY
Status: DISCONTINUED | OUTPATIENT
Start: 2023-08-06 | End: 2023-08-07 | Stop reason: HOSPADM

## 2023-08-06 RX ORDER — PIOGLITAZONEHYDROCHLORIDE 15 MG/1
30 TABLET ORAL DAILY
Status: DISCONTINUED | OUTPATIENT
Start: 2023-08-06 | End: 2023-08-07 | Stop reason: HOSPADM

## 2023-08-06 RX ORDER — LOSARTAN POTASSIUM 25 MG/1
25 TABLET ORAL DAILY
Status: DISCONTINUED | OUTPATIENT
Start: 2023-08-06 | End: 2023-08-07 | Stop reason: HOSPADM

## 2023-08-06 RX ORDER — METOPROLOL TARTRATE 100 MG/1
100 TABLET ORAL 2 TIMES DAILY
Status: DISCONTINUED | OUTPATIENT
Start: 2023-08-06 | End: 2023-08-07 | Stop reason: HOSPADM

## 2023-08-06 RX ORDER — BUSPIRONE HYDROCHLORIDE 5 MG/1
10 TABLET ORAL 3 TIMES DAILY
Status: DISCONTINUED | OUTPATIENT
Start: 2023-08-06 | End: 2023-08-07 | Stop reason: HOSPADM

## 2023-08-06 RX ADMIN — PIOGLITAZONE 30 MG: 15 TABLET ORAL at 08:02

## 2023-08-06 RX ADMIN — ATORVASTATIN CALCIUM 80 MG: 40 TABLET, FILM COATED ORAL at 21:03

## 2023-08-06 RX ADMIN — METOPROLOL 100 MG: 100 TABLET ORAL at 21:03

## 2023-08-06 RX ADMIN — MIRTAZAPINE 30 MG: 15 TABLET, FILM COATED ORAL at 21:04

## 2023-08-06 RX ADMIN — SODIUM CHLORIDE: 9 INJECTION, SOLUTION INTRAVENOUS at 19:50

## 2023-08-06 RX ADMIN — BUSPIRONE HYDROCHLORIDE 10 MG: 5 TABLET ORAL at 14:18

## 2023-08-06 RX ADMIN — ACETAMINOPHEN 650 MG: 325 TABLET ORAL at 19:46

## 2023-08-06 RX ADMIN — FAMOTIDINE 20 MG: 20 TABLET, FILM COATED ORAL at 21:04

## 2023-08-06 RX ADMIN — INSULIN LISPRO 2 UNITS: 100 INJECTION, SOLUTION INTRAVENOUS; SUBCUTANEOUS at 12:17

## 2023-08-06 RX ADMIN — GABAPENTIN 600 MG: 300 CAPSULE ORAL at 21:03

## 2023-08-06 RX ADMIN — INSULIN LISPRO 4 UNITS: 100 INJECTION, SOLUTION INTRAVENOUS; SUBCUTANEOUS at 08:02

## 2023-08-06 RX ADMIN — GABAPENTIN 600 MG: 300 CAPSULE ORAL at 14:18

## 2023-08-06 RX ADMIN — METOPROLOL 100 MG: 100 TABLET ORAL at 08:02

## 2023-08-06 RX ADMIN — ENOXAPARIN SODIUM 30 MG: 100 INJECTION SUBCUTANEOUS at 21:05

## 2023-08-06 RX ADMIN — BUSPIRONE HYDROCHLORIDE 10 MG: 5 TABLET ORAL at 21:03

## 2023-08-06 RX ADMIN — TAMSULOSIN HYDROCHLORIDE 0.4 MG: 0.4 CAPSULE ORAL at 08:02

## 2023-08-06 RX ADMIN — INSULIN GLARGINE 18 UNITS: 100 INJECTION, SOLUTION SUBCUTANEOUS at 21:04

## 2023-08-06 RX ADMIN — BUSPIRONE HYDROCHLORIDE 10 MG: 5 TABLET ORAL at 08:02

## 2023-08-06 RX ADMIN — INSULIN LISPRO 2 UNITS: 100 INJECTION, SOLUTION INTRAVENOUS; SUBCUTANEOUS at 17:22

## 2023-08-06 RX ADMIN — SODIUM CHLORIDE: 9 INJECTION, SOLUTION INTRAVENOUS at 03:40

## 2023-08-06 RX ADMIN — ENOXAPARIN SODIUM 30 MG: 100 INJECTION SUBCUTANEOUS at 08:04

## 2023-08-06 RX ADMIN — FAMOTIDINE 20 MG: 20 TABLET, FILM COATED ORAL at 08:02

## 2023-08-06 RX ADMIN — GABAPENTIN 600 MG: 300 CAPSULE ORAL at 08:02

## 2023-08-06 RX ADMIN — SODIUM CHLORIDE: 9 INJECTION, SOLUTION INTRAVENOUS at 12:15

## 2023-08-06 ASSESSMENT — PAIN DESCRIPTION - LOCATION
LOCATION: BACK
LOCATION: ABDOMEN
LOCATION: ABDOMEN

## 2023-08-06 ASSESSMENT — PAIN DESCRIPTION - ONSET
ONSET: ON-GOING

## 2023-08-06 ASSESSMENT — PAIN DESCRIPTION - FREQUENCY
FREQUENCY: CONTINUOUS

## 2023-08-06 ASSESSMENT — PAIN - FUNCTIONAL ASSESSMENT
PAIN_FUNCTIONAL_ASSESSMENT: ACTIVITIES ARE NOT PREVENTED

## 2023-08-06 ASSESSMENT — PAIN SCALES - GENERAL
PAINLEVEL_OUTOF10: 4
PAINLEVEL_OUTOF10: 8
PAINLEVEL_OUTOF10: 7
PAINLEVEL_OUTOF10: 3

## 2023-08-06 ASSESSMENT — PAIN DESCRIPTION - DESCRIPTORS
DESCRIPTORS: ACHING
DESCRIPTORS: ACHING
DESCRIPTORS: ACHING;BURNING

## 2023-08-06 ASSESSMENT — PAIN DESCRIPTION - ORIENTATION
ORIENTATION: MID
ORIENTATION: LOWER
ORIENTATION: LEFT;UPPER

## 2023-08-06 ASSESSMENT — PAIN DESCRIPTION - PAIN TYPE
TYPE: CHRONIC PAIN
TYPE: CHRONIC PAIN
TYPE: ACUTE PAIN

## 2023-08-06 NOTE — H&P
[facility]  [address] [facility], [address] [place] [address], [employer]    History & Physical Examination Note              Date:   8/6/2023  Patient name:  Arlin Barrow  Date of admission:  8/5/2023  4:05 PM  MRN:   [id number]  YOB: 1965    CHIEF COMPLAINT:       Chief Complaint   Patient presents with    Abdominal Pain     RUQ and LUQ, onset \"last couple of days\"    Diarrhea     On/off chronic     Emesis     X1 yesterday       History Obtained From:  Patient and chart review. HPI:     The patient is a 59 y.o.  male who presented with several concerns to the ED including abdominal pain, diarrhea and vomiting that worsened over the past several days. The patient indicates that his generalized abdominal pain worsened over a period of time. He also developed some nausea and vomiting at the time and had 1 episode. No blood present in the vomitus. No bleeding per rectum. He also has ongoing, chronic diarrhea, no known association with the metformin that he is on. No fevers, chills, no chest pain, pressure, discomfort and no shortness of breath. He also feels very fatigued. He took his Trulicity last Thursday. In the ED, labs and imaging were obtained and were reviewed. The case was discussed with the ED Provider prior to admission. There were concerns for his abnormal labs with elevated b-hydroxy as well as his dehydration with an NUNO and he was admitted for further evaluation and management. PAST MEDICAL HISTORY:        has a past medical history of Depression, Diabetes mellitus ([address]), and Hypertension. Diagnosis Date    Depression     Diabetes mellitus ([address])     Hypertension        PAST SURGICAL HISTORY:     History reviewed. No pertinent surgical history. FAMILY HISTORY:     History reviewed. No pertinent family history. HOME MEDICATIONS:     Prior to Admission medications    Medication Sig Start Date End Date Taking?  Authorizing Provider   VORTIoxetine (TRINTELLIX) 10 MG TABS tablet

## 2023-08-06 NOTE — PLAN OF CARE
Problem: Discharge Planning  Goal: Discharge to home or other facility with appropriate resources  Outcome: Progressing  Flowsheets (Taken 8/5/2023 2033)  Discharge to home or other facility with appropriate resources: Identify barriers to discharge with patient and caregiver     Problem: Neurosensory - Adult  Goal: Achieves maximal functionality and self care  Outcome: Progressing     Problem: Respiratory - Adult  Goal: Achieves optimal ventilation and oxygenation  Outcome: Progressing     Problem: Cardiovascular - Adult  Goal: Maintains optimal cardiac output and hemodynamic stability  Outcome: Progressing  Goal: Absence of cardiac dysrhythmias or at baseline  Outcome: Progressing     Problem: Skin/Tissue Integrity - Adult  Goal: Skin integrity remains intact  Outcome: Progressing  Goal: Oral mucous membranes remain intact  Outcome: Progressing     Problem: Musculoskeletal - Adult  Goal: Return mobility to safest level of function  Outcome: Progressing  Goal: Return ADL status to a safe level of function  Outcome: Progressing     Problem: Gastrointestinal - Adult  Goal: Minimal or absence of nausea and vomiting  Outcome: Progressing  Goal: Maintains or returns to baseline bowel function  Outcome: Progressing  Goal: Maintains adequate nutritional intake  Outcome: Progressing     Problem: Genitourinary - Adult  Goal: Absence of urinary retention  Outcome: Progressing     Problem: Infection - Adult  Goal: Absence of infection at discharge  Outcome: Progressing     Problem: Metabolic/Fluid and Electrolytes - Adult  Goal: Electrolytes maintained within normal limits  Outcome: Progressing  Goal: Hemodynamic stability and optimal renal function maintained  Outcome: Progressing  Goal: Glucose maintained within prescribed range  Outcome: Progressing     Problem: Hematologic - Adult  Goal: Maintains hematologic stability  Outcome: Progressing

## 2023-08-07 VITALS
HEIGHT: 71 IN | DIASTOLIC BLOOD PRESSURE: 80 MMHG | TEMPERATURE: 98.2 F | OXYGEN SATURATION: 93 % | WEIGHT: 234.13 LBS | RESPIRATION RATE: 16 BRPM | HEART RATE: 76 BPM | SYSTOLIC BLOOD PRESSURE: 123 MMHG | BODY MASS INDEX: 32.78 KG/M2

## 2023-08-07 LAB
ALBUMIN SERPL-MCNC: 3.4 G/DL (ref 3.5–5.2)
ALBUMIN/GLOB SERPL: 1.2 {RATIO} (ref 1–2.5)
ALP SERPL-CCNC: 97 U/L (ref 40–129)
ALT SERPL-CCNC: 25 U/L (ref 5–41)
ANION GAP SERPL CALCULATED.3IONS-SCNC: 8 MMOL/L (ref 9–17)
AST SERPL-CCNC: 13 U/L
BASOPHILS # BLD: 0.04 K/UL (ref 0–0.2)
BASOPHILS NFR BLD: 1 % (ref 0–2)
BILIRUB SERPL-MCNC: 0.3 MG/DL (ref 0.3–1.2)
BUN SERPL-MCNC: 10 MG/DL (ref 8–23)
BUN/CREAT SERPL: 14 (ref 9–20)
CALCIUM SERPL-MCNC: 8.8 MG/DL (ref 8.6–10.4)
CHLORIDE SERPL-SCNC: 107 MMOL/L (ref 98–107)
CO2 SERPL-SCNC: 24 MMOL/L (ref 20–31)
CREAT SERPL-MCNC: 0.7 MG/DL (ref 0.7–1.2)
EOSINOPHIL # BLD: 0.1 K/UL (ref 0–0.44)
EOSINOPHILS RELATIVE PERCENT: 1 % (ref 1–4)
ERYTHROCYTE [DISTWIDTH] IN BLOOD BY AUTOMATED COUNT: 12.8 % (ref 11.8–14.4)
EST. AVERAGE GLUCOSE BLD GHB EST-MCNC: 263 MG/DL
GFR SERPL CREATININE-BSD FRML MDRD: >60 ML/MIN/1.73M2
GLUCOSE BLD-MCNC: 203 MG/DL (ref 74–100)
GLUCOSE BLD-MCNC: 203 MG/DL (ref 74–100)
GLUCOSE BLD-MCNC: 278 MG/DL (ref 74–100)
GLUCOSE SERPL-MCNC: 210 MG/DL (ref 70–99)
HBA1C MFR BLD: 10.8 % (ref 4–6)
HCT VFR BLD AUTO: 39.3 % (ref 40.7–50.3)
HGB BLD-MCNC: 12.3 G/DL (ref 13–17)
IMM GRANULOCYTES # BLD AUTO: <0.03 K/UL (ref 0–0.3)
IMM GRANULOCYTES NFR BLD: 0 %
LYMPHOCYTES NFR BLD: 1.81 K/UL (ref 1.1–3.7)
LYMPHOCYTES RELATIVE PERCENT: 25 % (ref 24–43)
MCH RBC QN AUTO: 28 PG (ref 25.2–33.5)
MCHC RBC AUTO-ENTMCNC: 31.3 G/DL (ref 28.4–34.8)
MCV RBC AUTO: 89.5 FL (ref 82.6–102.9)
MICROORGANISM SPEC CULT: NORMAL
MONOCYTES NFR BLD: 0.53 K/UL (ref 0.1–1.2)
MONOCYTES NFR BLD: 7 % (ref 3–12)
NEUTROPHILS NFR BLD: 65 % (ref 36–65)
NEUTS SEG NFR BLD: 4.72 K/UL (ref 1.5–8.1)
NRBC BLD-RTO: 0 PER 100 WBC
PLATELET # BLD AUTO: 134 K/UL (ref 138–453)
PMV BLD AUTO: 10.9 FL (ref 8.1–13.5)
POTASSIUM SERPL-SCNC: 4.1 MMOL/L (ref 3.7–5.3)
PROT SERPL-MCNC: 6.2 G/DL (ref 6.4–8.3)
RBC # BLD AUTO: 4.39 M/UL (ref 4.21–5.77)
SODIUM SERPL-SCNC: 139 MMOL/L (ref 135–144)
SPECIMEN DESCRIPTION: NORMAL
WBC OTHER # BLD: 7.2 K/UL (ref 3.5–11.3)

## 2023-08-07 PROCEDURE — 36415 COLL VENOUS BLD VENIPUNCTURE: CPT

## 2023-08-07 PROCEDURE — 97110 THERAPEUTIC EXERCISES: CPT

## 2023-08-07 PROCEDURE — 2580000003 HC RX 258: Performed by: STUDENT IN AN ORGANIZED HEALTH CARE EDUCATION/TRAINING PROGRAM

## 2023-08-07 PROCEDURE — 97116 GAIT TRAINING THERAPY: CPT

## 2023-08-07 PROCEDURE — 82947 ASSAY GLUCOSE BLOOD QUANT: CPT

## 2023-08-07 PROCEDURE — 6360000002 HC RX W HCPCS: Performed by: STUDENT IN AN ORGANIZED HEALTH CARE EDUCATION/TRAINING PROGRAM

## 2023-08-07 PROCEDURE — 85025 COMPLETE CBC W/AUTO DIFF WBC: CPT

## 2023-08-07 PROCEDURE — 6370000000 HC RX 637 (ALT 250 FOR IP): Performed by: STUDENT IN AN ORGANIZED HEALTH CARE EDUCATION/TRAINING PROGRAM

## 2023-08-07 PROCEDURE — 97112 NEUROMUSCULAR REEDUCATION: CPT

## 2023-08-07 PROCEDURE — 94761 N-INVAS EAR/PLS OXIMETRY MLT: CPT

## 2023-08-07 PROCEDURE — 80053 COMPREHEN METABOLIC PANEL: CPT

## 2023-08-07 RX ORDER — LOSARTAN POTASSIUM 25 MG/1
25 TABLET ORAL DAILY
Qty: 30 TABLET | Refills: 3 | Status: SHIPPED | OUTPATIENT
Start: 2023-08-08

## 2023-08-07 RX ORDER — ATORVASTATIN CALCIUM 80 MG/1
80 TABLET, FILM COATED ORAL NIGHTLY
Qty: 30 TABLET | Refills: 3 | Status: SHIPPED | OUTPATIENT
Start: 2023-08-07

## 2023-08-07 RX ORDER — PANTOPRAZOLE SODIUM 40 MG/1
40 TABLET, DELAYED RELEASE ORAL
Status: DISCONTINUED | OUTPATIENT
Start: 2023-08-07 | End: 2023-08-07 | Stop reason: HOSPADM

## 2023-08-07 RX ORDER — BUSPIRONE HYDROCHLORIDE 10 MG/1
10 TABLET ORAL 3 TIMES DAILY
Qty: 90 TABLET | Refills: 0 | Status: SHIPPED | OUTPATIENT
Start: 2023-08-07 | End: 2023-09-06

## 2023-08-07 RX ORDER — GABAPENTIN 300 MG/1
600 CAPSULE ORAL 3 TIMES DAILY
Qty: 180 CAPSULE | Refills: 0 | Status: SHIPPED | OUTPATIENT
Start: 2023-08-07 | End: 2023-09-06

## 2023-08-07 RX ORDER — MIRTAZAPINE 30 MG/1
30 TABLET, FILM COATED ORAL NIGHTLY
Qty: 30 TABLET | Refills: 3 | Status: SHIPPED | OUTPATIENT
Start: 2023-08-07

## 2023-08-07 RX ORDER — INSULIN LISPRO 100 [IU]/ML
INJECTION, SOLUTION INTRAVENOUS; SUBCUTANEOUS
Qty: 3 ADJUSTABLE DOSE PRE-FILLED PEN SYRINGE | Refills: 3 | Status: SHIPPED | OUTPATIENT
Start: 2023-08-07

## 2023-08-07 RX ORDER — INSULIN GLARGINE 100 [IU]/ML
18 INJECTION, SOLUTION SUBCUTANEOUS NIGHTLY
Qty: 5 ADJUSTABLE DOSE PRE-FILLED PEN SYRINGE | Refills: 3 | Status: SHIPPED | OUTPATIENT
Start: 2023-08-07

## 2023-08-07 RX ADMIN — BUSPIRONE HYDROCHLORIDE 10 MG: 5 TABLET ORAL at 09:47

## 2023-08-07 RX ADMIN — SODIUM CHLORIDE, PRESERVATIVE FREE 10 ML: 5 INJECTION INTRAVENOUS at 08:03

## 2023-08-07 RX ADMIN — LOSARTAN POTASSIUM 25 MG: 25 TABLET, FILM COATED ORAL at 09:47

## 2023-08-07 RX ADMIN — FAMOTIDINE 20 MG: 20 TABLET, FILM COATED ORAL at 09:47

## 2023-08-07 RX ADMIN — BUSPIRONE HYDROCHLORIDE 10 MG: 5 TABLET ORAL at 14:08

## 2023-08-07 RX ADMIN — SODIUM CHLORIDE: 9 INJECTION, SOLUTION INTRAVENOUS at 04:07

## 2023-08-07 RX ADMIN — PANTOPRAZOLE SODIUM 40 MG: 40 TABLET, DELAYED RELEASE ORAL at 07:59

## 2023-08-07 RX ADMIN — INSULIN LISPRO 2 UNITS: 100 INJECTION, SOLUTION INTRAVENOUS; SUBCUTANEOUS at 17:16

## 2023-08-07 RX ADMIN — METOPROLOL 100 MG: 100 TABLET ORAL at 09:47

## 2023-08-07 RX ADMIN — INSULIN LISPRO 4 UNITS: 100 INJECTION, SOLUTION INTRAVENOUS; SUBCUTANEOUS at 12:44

## 2023-08-07 RX ADMIN — PIOGLITAZONE 30 MG: 15 TABLET ORAL at 09:47

## 2023-08-07 RX ADMIN — GABAPENTIN 600 MG: 300 CAPSULE ORAL at 14:08

## 2023-08-07 RX ADMIN — ENOXAPARIN SODIUM 30 MG: 100 INJECTION SUBCUTANEOUS at 09:47

## 2023-08-07 RX ADMIN — GABAPENTIN 600 MG: 300 CAPSULE ORAL at 09:47

## 2023-08-07 RX ADMIN — ACETAMINOPHEN 650 MG: 325 TABLET ORAL at 14:42

## 2023-08-07 RX ADMIN — TAMSULOSIN HYDROCHLORIDE 0.4 MG: 0.4 CAPSULE ORAL at 09:47

## 2023-08-07 RX ADMIN — INSULIN LISPRO 2 UNITS: 100 INJECTION, SOLUTION INTRAVENOUS; SUBCUTANEOUS at 07:59

## 2023-08-07 ASSESSMENT — PAIN DESCRIPTION - FREQUENCY
FREQUENCY: INTERMITTENT
FREQUENCY: INTERMITTENT

## 2023-08-07 ASSESSMENT — PAIN - FUNCTIONAL ASSESSMENT
PAIN_FUNCTIONAL_ASSESSMENT: ACTIVITIES ARE NOT PREVENTED
PAIN_FUNCTIONAL_ASSESSMENT: ACTIVITIES ARE NOT PREVENTED

## 2023-08-07 ASSESSMENT — PAIN DESCRIPTION - ORIENTATION
ORIENTATION: LEFT
ORIENTATION: LOWER

## 2023-08-07 ASSESSMENT — PAIN DESCRIPTION - PAIN TYPE
TYPE: ACUTE PAIN
TYPE: ACUTE PAIN

## 2023-08-07 ASSESSMENT — PAIN DESCRIPTION - DESCRIPTORS
DESCRIPTORS: ACHING
DESCRIPTORS: ACHING

## 2023-08-07 ASSESSMENT — PAIN SCALES - GENERAL
PAINLEVEL_OUTOF10: 5

## 2023-08-07 ASSESSMENT — PAIN DESCRIPTION - LOCATION
LOCATION: BACK
LOCATION: ABDOMEN

## 2023-08-07 NOTE — PLAN OF CARE
Problem: Discharge Planning  Goal: Discharge to home or other facility with appropriate resources  Outcome: Completed  Flowsheets (Taken 8/7/2023 0806 by Luanne Bennett, RN)  Discharge to home or other facility with appropriate resources: Identify barriers to discharge with patient and caregiver     Problem: Neurosensory - Adult  Goal: Achieves maximal functionality and self care  Outcome: Completed     Problem: Respiratory - Adult  Goal: Achieves optimal ventilation and oxygenation  Outcome: Completed     Problem: Cardiovascular - Adult  Goal: Maintains optimal cardiac output and hemodynamic stability  Outcome: Completed  Goal: Absence of cardiac dysrhythmias or at baseline  Outcome: Completed     Problem: Skin/Tissue Integrity - Adult  Goal: Skin integrity remains intact  Outcome: Completed  Flowsheets (Taken 8/7/2023 0806 by Luanne Bennett, RN)  Skin Integrity Remains Intact: Monitor for areas of redness and/or skin breakdown  Goal: Oral mucous membranes remain intact  Outcome: Completed  Flowsheets (Taken 8/7/2023 0806 by Luanne Bennett, RN)  Oral Mucous Membranes Remain Intact: Assess oral mucosa and hygiene practices     Problem: Musculoskeletal - Adult  Goal: Return mobility to safest level of function  Outcome: Completed  Flowsheets (Taken 8/7/2023 0806 by Luanne Bennett, RN)  Return Mobility to Safest Level of Function: Assess patient stability and activity tolerance for standing, transferring and ambulating with or without assistive devices  Goal: Return ADL status to a safe level of function  Outcome: Completed     Problem: Gastrointestinal - Adult  Goal: Minimal or absence of nausea and vomiting  Outcome: Completed  Goal: Maintains or returns to baseline bowel function  Outcome: Completed  Goal: Maintains adequate nutritional intake  Outcome: Completed     Problem: Genitourinary - Adult  Goal: Absence of urinary retention  Outcome: Completed     Problem: Infection - Adult  Goal: Absence of infection at

## 2023-08-07 NOTE — DISCHARGE INSTRUCTIONS
Follow up with diabetic education. Monitor blood sugars before meals and at bedtime. Keep track of your blood sugars and take to your next appointment.

## 2023-08-07 NOTE — PROGRESS NOTES
Assessment and vital signs complete. See flowsheets for details. Patient denies further needs at this time. Call light and bedside table are within reach. Care ongoing.
Educated patient on subcutaneous injection sites, importance of alternating sites, use of insulin pen, and how to self administer injections. Patient verbalized understanding and denied further questions. Patient demonstrated use of insulin pen correctly. Written information was given to patient as well.
Met with Patient this p.m. to discuss discharge planning. Patient is a 59year old single, white male, admitted with a diagnosis of NUNO and Diabetes. Patient is alert and oriented, polite and cooperative with this assessment. States that his plan is to return home when deemed medically stable for discharge. Patient lives alone in [facility]. 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 Patient uses a Bi pap machine at home. He has no outside resources or services currently in place. Patient is independent with his ADL's. He drives himself and provides for his own transportation needs. PCP is Dr. Mitchell Montes. Patient has [facility] and reports that he has no difficulty with obtaining his current prescription medications. Discharge plan is home when stable. Patient is a 'Full Code' status and he does voice an interest in pursuing medical directives. Referral made to [address], to assist with this request.  Patient designates his sister to be his decision maker if needed. W to monitor and assist with discharge planning needs as they arise.     [address] [place] [address]  8/7/2023
Occupational Therapy  Facility/Department: [facility] AT THE [facility] MED SURG  Daily Treatment Note  NAME: Laquita Jones  : 1965  MRN: [id number]    Date of Service: 2023    Discharge Recommendations:  Continue to assess pending progress         Patient Diagnosis(es): The primary encounter diagnosis was Hyperglycemia due to diabetes mellitus ([address] Diagnoses of Elevated blood ketone body level and Lactic acidosis due to diabetes mellitus ([address]) were also pertinent to this visit. Assessment    Activity Tolerance: Patient limited by fatigue;Patient tolerated treatment well  Discharge Recommendations: Continue to assess pending progress      Plan   Occupational Therapy Plan  Times Per Day: Once a day  Days Per Week: 7 Days  Current Treatment Recommendations: Strengthening;ROM;Balance training;Functional mobility training; Endurance training; Safety education & training;Patient/Caregiver education & training;Equipment evaluation, education, & procurement;Self-Care / ADL; Home management training     Restrictions  Restrictions/Precautions  Restrictions/Precautions: General Precautions    Subjective   Subjective  Subjective: Pt sitting up in bed upon arrival. Pt agreed to participate in therapy session. Pain: Pt reported 7/10 back pain. Orientation  Overall Orientation Status: Within Functional Limits  Pain: 6/10 abdominal, stated RN aware           Objective    Vitals          OT Exercises  Exercise Treatment: Pt completed BUE ther ex with 1# dumbbell x 7 planes x 15 reps x 1 set to increase UE strength and endurance in order to ease completion of ADL tasks. Pt required RBs as needed secondary to fatigue. Safety Devices  Type of Devices: All fall risk precautions in place; Left in bed;Call light within reach;Nurse notified     Patient Education  Education Given To: Patient  Education Provided: Role of Therapy;Plan of Care  Education Method: Verbal  Barriers to Learning: Cognition  Education Outcome: Verbalized
Occupational Therapy  Facility/Department: [facility] AT THE [facility] MED SURG  Occupational Therapy Initial Assessment    Name: Claudia Polo  : 1965  MRN: [id number]  Date of Service: 2023    Discharge Recommendations:             Patient Diagnosis(es): The primary encounter diagnosis was Hyperglycemia due to diabetes mellitus ([address] Diagnoses of Elevated blood ketone body level and Lactic acidosis due to diabetes mellitus ([address]) were also pertinent to this visit. Past Medical History:  has a past medical history of Depression, Diabetes mellitus ([address]), and Hypertension. Past Surgical History:  has no past surgical history on file. Treatment Diagnosis: Generalized weakness      Assessment   Performance deficits / Impairments: Decreased functional mobility ; Decreased ADL status; Decreased safe awareness;Decreased strength;Decreased balance;Decreased endurance;Decreased high-level IADLs  Assessment: 58 y/o male presents with decreased strength, endurance, balance, and safety awareness affecting his ability to safely perform ADL tasks. Pt would benefit from skilled OT services to address these deficits and return to OF. Treatment Diagnosis: Generalized weakness  Prognosis: Good  Decision Making: Medium Complexity  REQUIRES OT FOLLOW-UP: Yes        Plan   Occupational Therapy Plan  Times Per Day: Once a day  Days Per Week: 7 Days  Current Treatment Recommendations: Strengthening, ROM, Balance training, Functional mobility training, Endurance training, Safety education & training, Patient/Caregiver education & training, Equipment evaluation, education, & procurement, Self-Care / ADL, Home management training     Restrictions  Restrictions/Precautions  Restrictions/Precautions: General Precautions    Subjective   Subjective  Subjective: Pt reports abd pain 6/10. Pt agreeable to OT evaluation.      Social/Functional History  Social/Functional History  Lives With: Alone  Type of Home: Apartment (16 steps to enter, B
Patient asleep in bed, easily awoken. Denies pain. VS and assessment completed. Patient denies any n/v/d at this time. Stated he is feeling better today. Patient getting ready to get up and urinate and then eat breakfast. Denies any needs at this time.
Patient discharged at this time.
Physical Therapy  Facility/Department: [facility] AT THE [facility] MED SURG  Daily Treatment Note  NAME: Martín Santamaria  : 1965  MRN: [id number]    Date of Service: 2023    Discharge Recommendations:  Continue to assess pending progress, Outpatient PT     Patient Diagnosis(es): The primary encounter diagnosis was Hyperglycemia due to diabetes mellitus ([address] Diagnoses of Elevated blood ketone body level and Lactic acidosis due to diabetes mellitus ([address]) were also pertinent to this visit. Assessment   Assessment: Gait with no AD, SBA 60ftx1. Bed mobility/ transfers:SBA. Supine exercises B Le x20. Standing dynamic balance: fair+/good- balance. Activity Tolerance: Patient limited by fatigue;Patient tolerated treatment well     Plan    Physcial Therapy Plan  General Plan: 2 times a day 7 days a week  Current Treatment Recommendations: Strengthening;ROM;Balance training;Functional mobility training;Transfer training;ADL/Self-care training;IADL training; Endurance training;Gait training;Stair training;Neuromuscular re-education;Manual;Pain management;Home exercise program;Safety education & training;Patient/Caregiver education & training; Therapeutic activities     Restrictions  Restrictions/Precautions  Restrictions/Precautions: General Precautions     Subjective    Subjective  Subjective: Pt. up in bed upon arrival, agreeable to therapy at this time.   Pain: 6/10 abdominal, stated RN aware  Orientation  Overall Orientation Status: Within Functional Limits     Objective   Bed Mobility Training  Bed Mobility Training: Yes  Overall Level of Assistance: Supervision;Assist X1  Interventions: Verbal cues  Rolling: Supervision;Assist X1  Supine to Sit: Supervision;Assist X1  Sit to Supine: Supervision;Assist X1  Scooting: Supervision;Assist X1  Transfer Training  Transfer Training: Yes  Overall Level of Assistance: Supervision;Assist X1  Interventions: Verbal cues  Sit to Stand: Supervision;Assist X1  Stand to Sit: Supervision;Assist
Pt admitted to current room 314 from ER via stretcher with ER RN. Verbal report received from ER RN. Pt transferred to bed independently. Telemetry monitor applied. Oxygen sats 95 percent on room air. VS as charted. No s/s of distress at this time. Pt oriented to unit, room, call light and bed controls. Verbal safety instructions provided- pt verbalized understanding. Informed pt of plan of care. Pt denies needs. Will monitor pt closely. Orders released and pt received box lunch for the evening.
Vitals and assessment complete at this time. Patient resting in bed watching TV. Patient complains of 8/10 pain, Tylenol given per patient request. Patient denies any further needs. Call light within reach. Will continue to monitor.
Writer attempted to contact diabetic educator, they are unavailable this week, writer reached out to on-call educator and left message.
Writer reviewed discharge instructions with patient. Patient aware of need to  prescriptions from [address] and to also drop off Humalog prescription. Writer reviewed each new medication and side effects to monitor for. Writer watched patient administer his supper dose of insulin tonight with insulin needle. Patient demonstrated correct technique. Writer had patient explain how he would administer the insulin with a pen. Patient was reminded to make sure he primes the pen each time. Patient aware of need to follow a carb control diet and eat a protein snack before bed. Patient aware of order for Outpatient Diabetes Educator and need for repeat labs. Patient aware of date/time of follow up appointment. Patient has Diabetes Packet @ bedside. Reviewed information in packet. Patient's questions answered. Verbalized understanding. Copy of discharge instructions given to patient.
education & training, Patient/Caregiver education & training, Therapeutic activities  Safety Devices  Type of Devices: All fall risk precautions in place     Restrictions  Restrictions/Precautions  Restrictions/Precautions: General Precautions     Subjective   General  Chart Reviewed: Yes  Patient assessed for rehabilitation services?: Yes  Family / Caregiver Present: No  Referring Practitioner: Dylan Virgen MD  Referral Date : 08/05/23  Diagnosis: NUNO, N17.9  Follows Commands: Within Functional Limits  Subjective  Subjective: Patient denies pain upon PT arrival.         Social/Functional History  Social/Functional History  Lives With: Alone  Type of Home: Apartment  Home Layout: Performs ADL's on one level  Home Access: Stairs to enter with rails  Entrance Stairs - Number of Steps: 16 LULU with B railing  Entrance Stairs - Rails: Both  Bathroom Toilet: Standard  Has the patient had two or more falls in the past year or any fall with injury in the past year?: Yes  ADL Assistance: [address] Independent  Homemaking Responsibilities: Yes  Ambulation Assistance: Independent  Transfer Assistance: Independent  Active : Yes  Mode of Transportation: Car  Occupation: Part time employment  Type of Occupation: delivers medicine  Vision/Hearing  Vision  Vision: Within Functional Limits  Hearing  Hearing: Within functional limits    Cognition   Orientation  Overall Orientation Status: Within Functional Limits  Cognition  Overall Cognitive Status: WFL     Objective   Pulse: 81  [address]: Monitor; Apical  BP: 119/68  BP Location: Left upper arm  BP Method: Automatic  Patient Position: Supine  MAP (Calculated): 85  Respirations: 16  SpO2: 98 %  O2 Device: None (Room air)              AROM RLE (degrees)  RLE AROM: WFL  AROM LLE (degrees)  LLE AROM : WFL  Strength RLE  Comment: Grossly 4/5  Strength LLE  Comment: Grossly 4/5        Balance  Sitting: Intact  Standing: Impaired  Standing -
prophylaxis:  H2 , add PPI this AM    Above plan discussed with the patient who agreed to the above plan     Discussed care plan with nurse after getting their input. Please note that this chart was generated using voice recognition Dragon dictation software. Although every effort was made to ensure the accuracy of this automated transcription, some errors in transcription may have occurred.     Judith Ford MD  8/7/2023 6:37 AM

## 2023-08-07 NOTE — DISCHARGE SUMMARY
calcified granulomas Organs: Liver is normal in size with heterogenous decrease in density. No focal masses identified. No evidence of intrahepatic ductal dilatation. Spleen is normal size. The gallbladder is unremarkable. Both adrenal glands are normal.  Pancreas is normal in appearance. 4 mm and 2 mm nonobstructing stones in the left kidney. .  The kidneys are otherwise normal in size and attenuation without evidence of hydronephrosis. GI/Bowel: The visualized bowel and mesentery show no mass lesions. Normal appendix Pelvis: Postsurgical changes in the pelvis. Prostatic enlargement. .  Mild thickening of the urinary bladder wall. Rectum is intact. Peritoneum/Retroperitoneum: No free fluid. No lymphadenopathy. No evidence of pneumoperitoneum. Bones/Soft Tissues:. The abdominal and pelvic walls are unremarkable. Degenerative changes seen in the visualized spine . No acute bony abnormalities. No acute intra-abdominal or intrapelvic abnormalities are noted. Geographic fatty infiltration of the liver Small nonobstructing left renal stones Mild thickening of the urinary bladder wall possibly reflecting cystitis. Please correlate clinically and consider direct visualization if indicated. RECOMMENDATIONS: Based on MIPS measure 405, incidental abd lesions in this patient population do not warrant F/U W/O a documented medical reason. .     XR CHEST PORTABLE    Result Date: 8/5/2023  EXAMINATION: ONE XRAY VIEW OF THE CHEST 8/5/2023 4:34 pm COMPARISON: None. HISTORY: ORDERING SYSTEM PROVIDED HISTORY: Decreased breath sounds left lower lung base TECHNOLOGIST PROVIDED HISTORY: Decreased breath sounds left lower lung base FINDINGS: The lungs are without acute focal process. There is no effusion or pneumothorax. The cardiomediastinal silhouette is without acute process. The osseous structures are without acute process. No acute process.        Assessment and Plan:  Patient Active Problem List    Diagnosis Date Noted

## 2023-08-07 NOTE — CONSULTS
kg)  Current Body Weight: 234 lb 2.1 oz (106.2 kg), 136.1 % IBW. Weight Source: Bed Scale  Current BMI (kg/m2): 32.7  Usual Body Weight: 230 lb (104.3 kg)  % Weight Change (Calculated): 1.8  Weight Adjustment For: No Adjustment                 BMI Categories: Obese Class 1 (BMI 30.0-34. 9)    Estimated Daily Nutrient Needs:  Energy Requirements Based On: Kcal/kg  Weight Used for Energy Requirements: Current  Energy (kcal/day): 2263-6473 (15-18/kg)  Weight Used for Protein Requirements: Ideal  Protein (g/day): 94-109g (1.2-1.4g/kg)  Method Used for Fluid Requirements: ml/Kg  Fluid (ml/day): 2,120 ml (20/kg)  Recent Labs     08/05/23 1615 08/05/23 1618 08/05/23  1940 08/06/23  0545 08/07/23  0550   *  --   --  139 139   K 4.3  --   --  4.1 4.1   CL 93*  --   --  105 107   CO2 22  --   --  24 24   BUN 29*  --   --  18 10   CREATININE 1.5*  --   --  0.9 0.7   GLUCOSE 331*   < > 241 252* 210*   ALT 49*  --   --  33 25   ALKPHOS 110  --   --  88 97    < > = values in this interval not displayed.       Lab Results   Component Value Date/Time    LABALBU 3.4 08/07/2023 05:50 AM       Recent Labs     08/05/23  1615 08/05/23  1932 08/05/23 2039 08/06/23  0711 08/06/23  1106 08/06/23  1614 08/06/23  1939 08/07/23  0657   POCGLU 356* 241* 223* 251* 233* 222* 255* 203*    No results found for: LABA1C   Lab Results   Component Value Date/Time    TRIG 190 08/06/2023 05:45 AM    HDL 26 08/06/2023 05:45 AM      Nutrition Diagnosis:   Altered nutrition-related lab values related to endocrine dysfuntion as evidenced by lab values    Nutrition Interventions:   Food and/or Nutrient Delivery: Modify Current Diet  Nutrition Education/Counseling: Education needed  Coordination of Nutrition Care: Continue to monitor while inpatient  Plan of Care discussed with: Patient    Goals:     Goals: Meet at least 75% of estimated needs       Nutrition Monitoring and Evaluation:   Behavioral-Environmental Outcomes: None Identified  Food/Nutrient

## 2023-08-07 NOTE — CARE COORDINATION
Case Management Assessment  Initial Evaluation    Date/Time of Evaluation: 8/7/2023 3:36 PM  Assessment Completed by: [id number]    If patient is discharged prior to next notation, then this note serves as note for discharge by case management. Patient Name: Cecilio Acevedo                   YOB: 1965  Diagnosis: NUNO (acute kidney injury) ([address]) [N17.9]  Elevated blood ketone body level [R79.89]  Lactic acidosis due to diabetes mellitus ([address]) [E11.10]  Hyperglycemia due to diabetes mellitus ([address]) [E11.65]                   Date / Time: 8/5/2023  4:05 PM    Patient Admission Status: Inpatient   Readmission Risk (Low < 19, Mod (19-27), High > 27): Readmission Risk Score: 7.4    Current PCP: Choco Carvajal MD  PCP verified by CM? Yes    Chart Reviewed: Yes      History Provided by: Patient  Patient Orientation: Alert and Oriented, Person, Place, Situation, Self    Patient Cognition: Alert    Hospitalization in the last 30 days (Readmission):  No    If yes, Readmission Assessment in  Navigator will be completed. Advance Directives:      Code Status: Full Code   Patient's Primary Decision Maker is: Legal Next of Kin    Primary Decision Maker: Damari Benitez Brother/Sister - [phone]    Discharge Planning:    Patient lives with: Alone Type of Home: Apartment  Primary Care Giver: Self  Patient Support Systems include: Family Members   Current Financial resources: Medicaid  Current community resources: None  Current services prior to admission: Home Bipap            Current DME:              Type of Home Care services:  None    ADLS  Prior functional level: Independent in ADLs/IADLs  Current functional level: Independent in ADLs/IADLs    PT AM-PAC:   /24  OT AM-PAC: 23 /24    Family can provide assistance at DC: Yes  Would you like Case Management to discuss the discharge plan with any other family members/significant others, and if so, who?  No  Plans to Return to Present Housing:

## 2023-08-07 NOTE — DISCHARGE INSTR - DIET
Good nutrition is important when healing from an illness, injury, or surgery. Follow any nutrition recommendations given to you during your hospital stay. If you were given an oral nutrition supplement while in the hospital, continue to take this supplement at home. You can take it with meals, in-between meals, and/or before bedtime. These supplements can be purchased at most local grocery stores, pharmacies, and chain [employer]-stores. If you have any questions about your diet or nutrition, call the hospital and ask for the dietitian. Carb Control:  4 carbs per each meal  Fluids encouraged.

## 2023-08-10 ENCOUNTER — HOSPITAL ENCOUNTER (EMERGENCY)
Age: 58
LOS: 1 days | Discharge: ANOTHER HOSPITAL | End: 2023-08-11
Payer: COMMERCIAL

## 2023-08-10 VITALS
RESPIRATION RATE: 29 BRPM | OXYGEN SATURATION: 90 % | HEART RATE: 114 BPM | DIASTOLIC BLOOD PRESSURE: 92 MMHG | SYSTOLIC BLOOD PRESSURE: 182 MMHG

## 2023-08-10 VITALS
HEART RATE: 112 BPM | RESPIRATION RATE: 13 BRPM | SYSTOLIC BLOOD PRESSURE: 170 MMHG | DIASTOLIC BLOOD PRESSURE: 91 MMHG | OXYGEN SATURATION: 94 %

## 2023-08-10 VITALS — OXYGEN SATURATION: 93 % | RESPIRATION RATE: 24 BRPM | HEART RATE: 115 BPM

## 2023-08-10 VITALS — RESPIRATION RATE: 21 BRPM | HEART RATE: 116 BPM | OXYGEN SATURATION: 92 %

## 2023-08-10 VITALS — OXYGEN SATURATION: 87 % | HEART RATE: 86 BPM | RESPIRATION RATE: 11 BRPM

## 2023-08-10 VITALS
RESPIRATION RATE: 35 BRPM | DIASTOLIC BLOOD PRESSURE: 102 MMHG | OXYGEN SATURATION: 94 % | HEART RATE: 103 BPM | SYSTOLIC BLOOD PRESSURE: 165 MMHG

## 2023-08-10 VITALS — OXYGEN SATURATION: 91 % | HEART RATE: 93 BPM | RESPIRATION RATE: 18 BRPM

## 2023-08-10 VITALS
DIASTOLIC BLOOD PRESSURE: 103 MMHG | OXYGEN SATURATION: 92 % | HEART RATE: 117 BPM | SYSTOLIC BLOOD PRESSURE: 183 MMHG | RESPIRATION RATE: 20 BRPM

## 2023-08-10 VITALS — HEART RATE: 85 BPM | RESPIRATION RATE: 18 BRPM

## 2023-08-10 VITALS — HEART RATE: 115 BPM | RESPIRATION RATE: 28 BRPM | OXYGEN SATURATION: 92 %

## 2023-08-10 VITALS
OXYGEN SATURATION: 91 % | HEART RATE: 115 BPM | RESPIRATION RATE: 19 BRPM | SYSTOLIC BLOOD PRESSURE: 196 MMHG | DIASTOLIC BLOOD PRESSURE: 95 MMHG

## 2023-08-10 VITALS — DIASTOLIC BLOOD PRESSURE: 107 MMHG | OXYGEN SATURATION: 95 % | SYSTOLIC BLOOD PRESSURE: 171 MMHG

## 2023-08-10 VITALS
HEART RATE: 114 BPM | DIASTOLIC BLOOD PRESSURE: 102 MMHG | SYSTOLIC BLOOD PRESSURE: 175 MMHG | RESPIRATION RATE: 25 BRPM | OXYGEN SATURATION: 92 %

## 2023-08-10 VITALS — HEART RATE: 114 BPM | OXYGEN SATURATION: 92 % | RESPIRATION RATE: 9 BRPM

## 2023-08-10 VITALS
HEART RATE: 123 BPM | TEMPERATURE: 98.1 F | DIASTOLIC BLOOD PRESSURE: 94 MMHG | SYSTOLIC BLOOD PRESSURE: 160 MMHG | OXYGEN SATURATION: 97 % | RESPIRATION RATE: 22 BRPM

## 2023-08-10 VITALS
OXYGEN SATURATION: 92 % | DIASTOLIC BLOOD PRESSURE: 97 MMHG | HEART RATE: 116 BPM | SYSTOLIC BLOOD PRESSURE: 193 MMHG | RESPIRATION RATE: 21 BRPM

## 2023-08-10 VITALS — HEART RATE: 87 BPM | OXYGEN SATURATION: 93 % | RESPIRATION RATE: 39 BRPM

## 2023-08-10 VITALS — HEART RATE: 85 BPM | RESPIRATION RATE: 15 BRPM

## 2023-08-10 VITALS — RESPIRATION RATE: 12 BRPM | OXYGEN SATURATION: 90 % | HEART RATE: 113 BPM

## 2023-08-10 VITALS
RESPIRATION RATE: 20 BRPM | DIASTOLIC BLOOD PRESSURE: 89 MMHG | SYSTOLIC BLOOD PRESSURE: 122 MMHG | OXYGEN SATURATION: 91 % | HEART RATE: 90 BPM

## 2023-08-10 VITALS
HEART RATE: 113 BPM | RESPIRATION RATE: 25 BRPM | SYSTOLIC BLOOD PRESSURE: 185 MMHG | OXYGEN SATURATION: 92 % | DIASTOLIC BLOOD PRESSURE: 107 MMHG

## 2023-08-10 VITALS — RESPIRATION RATE: 25 BRPM | HEART RATE: 86 BPM | OXYGEN SATURATION: 92 %

## 2023-08-10 VITALS
HEART RATE: 86 BPM | SYSTOLIC BLOOD PRESSURE: 167 MMHG | OXYGEN SATURATION: 87 % | DIASTOLIC BLOOD PRESSURE: 96 MMHG | RESPIRATION RATE: 24 BRPM

## 2023-08-10 VITALS — OXYGEN SATURATION: 92 % | RESPIRATION RATE: 17 BRPM | HEART RATE: 111 BPM

## 2023-08-10 VITALS — RESPIRATION RATE: 21 BRPM | HEART RATE: 109 BPM

## 2023-08-10 VITALS — HEART RATE: 84 BPM | RESPIRATION RATE: 23 BRPM | OXYGEN SATURATION: 93 %

## 2023-08-10 VITALS — OXYGEN SATURATION: 95 % | RESPIRATION RATE: 20 BRPM | HEART RATE: 113 BPM

## 2023-08-10 VITALS — HEART RATE: 115 BPM | OXYGEN SATURATION: 90 % | RESPIRATION RATE: 32 BRPM

## 2023-08-10 VITALS
SYSTOLIC BLOOD PRESSURE: 177 MMHG | DIASTOLIC BLOOD PRESSURE: 90 MMHG | OXYGEN SATURATION: 92 % | HEART RATE: 87 BPM | RESPIRATION RATE: 29 BRPM

## 2023-08-10 VITALS — OXYGEN SATURATION: 93 % | RESPIRATION RATE: 18 BRPM | HEART RATE: 88 BPM

## 2023-08-10 VITALS — OXYGEN SATURATION: 94 %

## 2023-08-10 VITALS — OXYGEN SATURATION: 93 %

## 2023-08-10 VITALS — HEART RATE: 114 BPM

## 2023-08-10 VITALS — HEART RATE: 116 BPM | RESPIRATION RATE: 26 BRPM

## 2023-08-10 VITALS — RESPIRATION RATE: 24 BRPM | OXYGEN SATURATION: 100 % | HEART RATE: 79 BPM

## 2023-08-10 VITALS — HEART RATE: 87 BPM | RESPIRATION RATE: 13 BRPM

## 2023-08-10 VITALS — OXYGEN SATURATION: 98 % | HEART RATE: 86 BPM | RESPIRATION RATE: 30 BRPM

## 2023-08-10 VITALS — OXYGEN SATURATION: 91 % | HEART RATE: 114 BPM | RESPIRATION RATE: 26 BRPM

## 2023-08-10 VITALS — OXYGEN SATURATION: 98 % | HEART RATE: 83 BPM

## 2023-08-10 VITALS — RESPIRATION RATE: 28 BRPM | HEART RATE: 115 BPM | OXYGEN SATURATION: 87 %

## 2023-08-10 VITALS — DIASTOLIC BLOOD PRESSURE: 93 MMHG | HEART RATE: 85 BPM | RESPIRATION RATE: 17 BRPM | SYSTOLIC BLOOD PRESSURE: 147 MMHG

## 2023-08-10 VITALS — RESPIRATION RATE: 14 BRPM | HEART RATE: 120 BPM

## 2023-08-10 VITALS — OXYGEN SATURATION: 94 % | HEART RATE: 93 BPM | RESPIRATION RATE: 15 BRPM

## 2023-08-10 VITALS — HEART RATE: 113 BPM | RESPIRATION RATE: 20 BRPM

## 2023-08-10 VITALS — OXYGEN SATURATION: 88 %

## 2023-08-10 VITALS — RESPIRATION RATE: 24 BRPM | OXYGEN SATURATION: 93 % | HEART RATE: 116 BPM

## 2023-08-10 VITALS — HEART RATE: 88 BPM | OXYGEN SATURATION: 91 %

## 2023-08-10 VITALS — OXYGEN SATURATION: 92 % | RESPIRATION RATE: 12 BRPM | HEART RATE: 115 BPM

## 2023-08-10 VITALS — OXYGEN SATURATION: 90 % | RESPIRATION RATE: 30 BRPM | HEART RATE: 86 BPM

## 2023-08-10 VITALS — HEART RATE: 107 BPM

## 2023-08-10 VITALS — HEART RATE: 117 BPM | OXYGEN SATURATION: 94 %

## 2023-08-10 VITALS — HEART RATE: 118 BPM | RESPIRATION RATE: 16 BRPM

## 2023-08-10 VITALS — HEART RATE: 87 BPM | OXYGEN SATURATION: 97 %

## 2023-08-10 VITALS — HEART RATE: 83 BPM

## 2023-08-10 DIAGNOSIS — K52.9: ICD-10-CM

## 2023-08-10 DIAGNOSIS — R10.9: ICD-10-CM

## 2023-08-10 DIAGNOSIS — R73.9: ICD-10-CM

## 2023-08-10 DIAGNOSIS — K55.059: Primary | ICD-10-CM

## 2023-08-10 LAB
ADD MANUAL DIFF: NO
ALANINE AMINOTRANSFERASE: 30 U/L (ref 16–63)
ALBUMIN GLOBULIN RATIO: 0.7
ALBUMIN LEVEL: 3.1 G/DL (ref 3.4–5)
ALKALINE PHOSPHATASE: 99 U/L (ref 46–116)
ANION GAP: 16.8
ASPARTATE AMINO TRANSFERASE: 27 U/L (ref 15–37)
BLOOD UREA NITROGEN: 9 MG/DL (ref 7–18)
CALCIUM: 8.8 MG/DL (ref 8.5–10.1)
CARBON DIOXIDE: 25.9 MMOL/L (ref 21–32)
CHLORIDE: 97 MMOL/L (ref 98–107)
CO2 BLD-SCNC: 25.9 MMOL/L (ref 21–32)
ESTIMATED GFR (AFRICAN AMERICA: >60 (ref 60–?)
ESTIMATED GFR (NON-AFRICAN AME: 58 (ref 60–?)
GLOBULIN: 4.5 G/DL
GLUCOSE BLD-MCNC: 380 MG/DL (ref 74–106)
HCT VFR BLD CALC: 42.2 % (ref 42–54)
HEMATOCRIT: 42.2 % (ref 42–54)
HEMOGLOBIN: 14.2 G/DL (ref 14–18)
IMMATURE GRANULOCYTES ABS AUTO: 0.1 10^3/UL (ref 0–0.03)
IMMATURE GRANULOCYTES PCT AUTO: 0.6 % (ref 0–0.5)
LACTATE/LACTIC ACID: 3 MMOL/L (ref 0.4–2)
LACTATE/LACTIC ACID: 3.4 MMOL/L (ref 0.4–2)
LIPASE: 98 U/L (ref 73–393)
LYMPHOCYTES  ABSOLUTE AUTO: 1.2 10^3/UL (ref 1.2–3.8)
MCV RBC: 83.2 FL (ref 80–94)
MEAN CORPUSCULAR HEMOGLOBIN: 28 PG (ref 25.9–34)
MEAN CORPUSCULAR HGB CONC: 33.6 G/DL (ref 29.9–35.2)
MEAN CORPUSCULAR VOLUME: 83.2 FL (ref 80–94)
PLATELET # BLD: 236 10^3/UL (ref 150–450)
PLATELET COUNT: 236 10^3/UL (ref 150–450)
POTASSIUM SERPLBLD-SCNC: 3.7 MMOL/L (ref 3.5–5.1)
POTASSIUM: 3.7 MMOL/L (ref 3.5–5.1)
RED BLOOD COUNT: 5.07 10^6/UL (ref 4.7–6.1)
SODIUM BLD-SCNC: 136 MMOL/L (ref 136–145)
SODIUM: 136 MMOL/L (ref 136–145)
TOTAL PROTEIN: 7.6 G/DL (ref 6.4–8.2)
WBC # BLD: 16.6 10^3/UL (ref 4–11)
WHITE BLOOD COUNT: 16.6 10^3/UL (ref 4–11)

## 2023-08-10 PROCEDURE — 96375 TX/PRO/DX INJ NEW DRUG ADDON: CPT

## 2023-08-10 PROCEDURE — 99285 EMERGENCY DEPT VISIT HI MDM: CPT

## 2023-08-10 PROCEDURE — 96376 TX/PRO/DX INJ SAME DRUG ADON: CPT

## 2023-08-10 PROCEDURE — 96374 THER/PROPH/DIAG INJ IV PUSH: CPT

## 2023-08-10 PROCEDURE — 81001 URINALYSIS AUTO W/SCOPE: CPT

## 2023-08-10 PROCEDURE — 84484 ASSAY OF TROPONIN QUANT: CPT

## 2023-08-10 PROCEDURE — 85025 COMPLETE CBC W/AUTO DIFF WBC: CPT

## 2023-08-10 PROCEDURE — 83690 ASSAY OF LIPASE: CPT

## 2023-08-10 PROCEDURE — 83605 ASSAY OF LACTIC ACID: CPT

## 2023-08-10 PROCEDURE — 93005 ELECTROCARDIOGRAM TRACING: CPT

## 2023-08-10 PROCEDURE — 36415 COLL VENOUS BLD VENIPUNCTURE: CPT

## 2023-08-10 PROCEDURE — 80053 COMPREHEN METABOLIC PANEL: CPT

## 2023-08-10 PROCEDURE — 74177 CT ABD & PELVIS W/CONTRAST: CPT

## 2023-08-10 NOTE — ED.ABDPAIN1
HPI - Abdominal Pain
General
Chief Complaint: Abdominal Pain
Stated Complaint: ADBOMINAL PAIN
Time Seen by Provider: 08/10/23 16:54
Source: patient
Mode of arrival: walk-in
History of Present Illness
HPI narrative: 
patient is a 58-year-old male who presents to the emergency department for two day history of pain radiating from the epigastrium to the lower abdomen in the midline. He states it is a constant nagging pain. He denies chest pain, shortness of 
breath. He has not had any fevers or upper respiratory symptoms. He is noted to have old EKG patches and adhesive to his chest, he states he was at the [place] emergency Department last week for hyperglycemia and dehydration. he states he was not 
having abdominal pain at that time. He states his sugars have been in the 200s for the past week. He denies any urinary symptoms. He has constant chronic diarrhea that is not different.
Related Data
Allergies

Allergy/AdvReac Type Severity Reaction Status Date / Time
No Known Drug Allergies Allergy   Verified 08/10/23 16:56



Review of Systems
ROS  
 Constitutional Denies: fever or chills   
 Ears, nose, mouth, and throat Denies: throat pain or neck pain   
 Cardiovascular Denies: chest pain   
 Respiratory Denies: shortness of breath   
 Gastrointestinal Reports: abdominal pain and diarrhea; Denies: vomiting   
 Genitourinary Denies: painful urination   
 Musculoskeletal Denies: back pain   
 Integumentary/Breast Denies: rash   
 Neurological Denies: headache   

Exam
Narrative
Exam Narrative: 
Gen.: Awake, alert, in no distress, ambulatory
Head: Normocephalic, atraumatic
ENT: Moist mucous membranes
Respiratory: No respiratory distress, lungs clear bilaterally
Cardio: tachycardic
Gastrointestinal: Abdomen is soft, nondistended and nontender with palpation, no pulsatile masses appreciated
Extremities: Moves extremities equally, no injuries noted
Psych: Normal mood and affect
Neuro: No focal neuro deficit
Skin: Warm, diaphoretic
Constitutional
Vital Signs, click to edit/add: 

Last Vital Signs

Temp  98.1 F   08/10/23 16:53
Pulse  123 H  08/10/23 16:53
Resp  22   08/10/23 16:53
BP  160/94 H  08/10/23 16:53
Pulse Ox  97   08/10/23 16:53
O2 Del Method  Room Air  08/10/23 16:53




Course
Vital Signs
Vital signs: 

Vital Signs

Temperature  98.1 F   08/10/23 16:53
Pulse Rate  123 H  08/10/23 16:53
Respiratory Rate  22   08/10/23 16:53
Blood Pressure  160/94 H  08/10/23 16:53
Pulse Oximetry  97   08/10/23 16:53
Oxygen Delivery Method  Room Air  08/10/23 16:53



Temperature  98.1 F   08/10/23 16:53
Pulse Rate  123 H  08/10/23 16:53
Respiratory Rate  22   08/10/23 16:53
Blood Pressure  160/94 H  08/10/23 16:53
Pulse Oximetry  97   08/10/23 16:53
Oxygen Delivery Method  Room Air  08/10/23 16:53




MDM - Abdominal Pain
MDM Narrative
Medical decision making narrative: 
patient was medicated with IV morphine, Zofran, fluids. Additional Pepcid and Levsin were given. Lab studies show white blood cell count is elevated, lactic acid is elevated. Records were obtained from outside facility showing that the patient was 
complaining of abdominal cramping and had a noncontrast CT last week. This was unremarkable. Patient had already been sent for CT of the abdomen and pelvis with IV contrast, the radiologist called to give a verbal report with concern for mesenteric 
ischemia. Due to the patient's clinical picture, we do not have vascular services at this facility and he will require transfer to tertiary care for evaluation. Additional Dilaudid given for pain control. Patient requests transfer to [facility].
Discussed with Tonya MCBRIDE-NP ([id number]) who accepted the patient for transfer for Dr. Mims. patient is mildly tachycardic at this time with stable blood pressure. Critical care time thirty-five minutes.
Medical Records
Attestation: I reviewed the patient's medical records.
Lab Data
Attestation: I reviewed the patient's lab results.
Labs: 

Lab Results

  08/10/23 08/10/23 Range/Units
  17:03 17:15 
WBC   16.6 H  (4.0-11.0)  10^3/uL
RBC   5.07  (4.70-6.10)  10^6/uL
Hgb   14.2  (14.0-18.0)  g/dL
Hct   42.2  (42.0-54.0)  %
MCV   83.2  (80.0-94.0)  fL
MCH   28.0  (25.9-34.0)  pg
MCHC   33.6  (29.9-35.2)  g/dL
RDW   13.2  (11.0-15.0)  %
Plt Count   236  (150-450)  10^3/uL
MPV   11.0  (9.5-13.5)  fL
Neut % (Auto)   86.8 H  (43.0-75.0)  %
Lymph % (Auto)   7.4 L  (20.5-60.0)  %
Mono % (Auto)   4.6  (1.7-12.0)  %
Eos % (Auto)   0.3 L  (0.9-7.0)  %
Baso % (Auto)   0.3  (0.2-2.0)  %
Neut # (Auto)   14.5 H  (1.4-6.5)  10^3/uL
Lymph # (Auto)   1.2  (1.2-3.8)  10^3/uL
Mono # (Auto)   0.8  (0.3-0.8)  10^3/uL
Eos # (Auto)   0.1  (0.0-0.7)  10^3/uL
Baso # (Auto)   0.1  (0.0-0.1)  10^3/uL
Abs Immat Gran (auto)   0.10 H  (0.00-0.03)  10^3/uL
Imm/Tot Granulo (auto)   0.6 H  (0.0-0.5)  %
Sodium   136  (136-145)  mmol/L
Potassium   3.7  (3.5-5.1)  mmol/L
Chloride   97 L  ()  mmol/L
Carbon Dioxide   25.9  (21.0-32.0)  mmol/L
Anion Gap   16.8  
BUN   9.0  (7.0-18.0)  mg/dL
Creatinine   1.26  (0.70-1.30)  mg/dL
Est GFR ( Amer)   >60  (>=60)  
Est GFR (Non-Af Amer)   58 L  (>=60)  
BUN/Creatinine Ratio   7.1  
Glucose   380 H  ()  mg/dL
Lactate   3.0 H*  (0.4-2.0)  mmol/L
Calcium   8.8  (8.5-10.1)  mg/dL
Total Bilirubin   0.9  (0.2-1.0)  mg/dL
AST   27  (15-37)  U/L
ALT   30  (16-63)  U/L
Alkaline Phosphatase   99  ()  U/L
Troponin I High Sens   4.6  (4.0-76.1)  pg/mL
Total Protein   7.6  (6.4-8.2)  g/dL
Albumin   3.1 L  (3.4-5.0)  g/dL
Globulin   4.5  g/dL
Albumin/Globulin Ratio   0.7  
Lipase   98.0  (73.0-393.0)  U/L
POC Glucose  293 H   ()  mg/dL



Imaging Data
CT scan - chest: 
      Attestation: I have reviewed the pertinent imaging results.
      Radiologist's impression: 
Procedure: CT abdomen pelvis w con
EXAM: CT abdomen pelvis w con
HISTORY: Abdominal pain
COMPARISON: None.
TECHNIQUE: Axial CT imaging was performed through the abdomen and pelvis with
intravenous contrast. Multiplanar reformats were performed. Dose reduction
techniques were achieved by using automated exposure control and/or adjustment
of mA and/or kV according to patient size and/or use of iterative
reconstruction technique.
FINDINGS:
Lung bases: There are few patchy groundglass pulmonary nodules in the location
of the left lower lobe, nonspecific and may represent aspiration pneumonia.
GI upper: Small hiatal hernia.
Liver: Hepatic steatosis. Normal size and contour.
Gallbladder: No significant abnormality. No cholelithiasis.
Biliary system: No intra or extrahepatic biliary ductal dilatation.
Spleen: Normal size.
Pancreas: Unremarkable.
Adrenal glands: Normal adrenal glands.
Kidneys/ureters: Normal contours. No hydronephrosis. Nonobstructing 0.4 cm left
renal stone.
Vessels: No aneurysm. No definite venous thrombus or arterial occlusion is
noted.
Lymph Nodes: No lymphadenopathy.
Small bowel: Moderate to severe circumferential wall thickening of the proximal
small bowel loops with surrounding fat stranding and vasa recta engorgement
with possible transition point in the left lower abdomen (series 5, image 44
and series 3, image 93) and collapse of the distal bowel loops. There appears
internal hernia and vascular narrowing, resulting in bowel ischemia (venous
type).
Colon: No wall thickening or dilatation.
Appendix: No findings of appendicitis.
Peritoneal cavity: No pneumoperitoneum. Small perihepatic and left greater than
right paracolic gutter ascites.
Lower : Unremarkable.
Bones: No acute bony abnormality.
Soft tissues: No acute finding.
Additional findings: None.
IMPRESSION:
few patchy groundglass pulmonary nodules in the location of the left lower
lobe, nonspecific and may represent aspiration pneumonia.
Small hiatal hernia.
Moderate to severe circumferential wall thickening of the proximal small bowel
loops with surrounding fat stranding and vasa recta engorgement with possible
transition point in the left lower abdomen and collapse of the distal bowel
loops. There appears internal hernia and vascular narrowing, resulting in bowel
ischemia (venous type).
Small perihepatic and left greater than right paracolic gutter ascites.
Severe hepatic steatosis.
Nonobstructing 0.4 cm left renal stone.
Critical results were NOTIFIED by TELEPHONE BY Dr. Lizzeth Potts MD to [facility]
At 8/10/2023 6:26 PM EDT.
Electronically authenticated by: LIZZETH POTTS Date: 8/10/2023 18:44
ECG Data
Attestation: I personally reviewed and interpreted this ECG as follows: (sinus tachycardia at a rate of 119, no acute ST elevation or ectopy. EKG reviewed by attending physician)
ECG interpretation date: 08/10/23
ECG interpretation time: 17:07

Critical Care Time
Critical Care Time
Critical Care Time: Yes
Total Critical Care Time: 35
Attestation: 
35min critical care time

Discharge Plan
Discharge
Chief Complaint: Abdominal Pain

Clinical Impression:
 Acute mesenteric ischemia, Abdominal pain, Acute hyperglycemia


Patient Disposition: [facility]

Time of Disposition Decision: 19:31

Discharge Location: [facility]

Referrals:
DINH LIN [Primary Care Provider] - 1 week

## 2023-08-10 NOTE — ED_ITS
HPI - Abdominal Pain    
General    
Chief Complaint: Abdominal Pain    
Stated Complaint: ADBOMINAL PAIN    
Time Seen by Provider: 08/10/23 16:54    
Source: patient    
Mode of arrival: walk-in    
History of Present Illness    
HPI narrative:     
patient is a 58-year-old male who presents to the emergency department for two   
day history of pain radiating from the epigastrium to the lower abdomen in the   
midline. He states it is a constant nagging pain. He denies chest pain,   
shortness of breath. He has not had any fevers or upper respiratory symptoms. He  
is noted to have old EKG patches and adhesive to his chest, he states he was at   
the [place] emergency Department last week for hyperglycemia and dehydration. he   
states he was not having abdominal pain at that time. He states his sugars have   
been in the 200s for the past week. He denies any urinary symptoms. He has   
constant chronic diarrhea that is not different.    
Related Data    
                                    Allergies    
    
    
    
Allergy/AdvReac Type Severity Reaction Status Date / Time    
     
No Known Drug Allergies Allergy   Verified 08/10/23 16:56    
    
    
    
    
Review of Systems    
    
    
ROS      
    
 Constitutional Denies: fever or chills       
    
 Ears, nose, mouth, and throat Denies: throat pain or neck pain       
    
 Cardiovascular Denies: chest pain       
    
 Respiratory Denies: shortness of breath       
    
 Gastrointestinal Reports: abdominal pain and diarrhea; Denies: vomiting       
    
 Genitourinary Denies: painful urination       
    
 Musculoskeletal Denies: back pain       
    
 Integumentary/Breast Denies: rash       
    
 Neurological Denies: headache       
    
    
Exam    
Narrative    
Exam Narrative:     
Gen.: Awake, alert, in no distress, ambulatory    
Head: Normocephalic, atraumatic    
ENT: Moist mucous membranes    
Respiratory: No respiratory distress, lungs clear bilaterally    
Cardio: tachycardic    
Gastrointestinal: Abdomen is soft, nondistended and nontender with palpation, no  
pulsatile masses appreciated    
Extremities: Moves extremities equally, no injuries noted    
Psych: Normal mood and affect    
Neuro: No focal neuro deficit    
Skin: Warm, diaphoretic    
Constitutional    
Vital Signs, click to edit/add:     
    
                                Last Vital Signs    
    
    
    
Temp  98.1 F   08/10/23 16:53    
     
Pulse  123 H  08/10/23 16:53    
     
Resp  22   08/10/23 16:53    
     
BP  160/94 H  08/10/23 16:53    
     
Pulse Ox  97   08/10/23 16:53    
     
O2 Del Method  Room Air  08/10/23 16:53    
    
    
    
    
    
Course    
Vital Signs    
Vital signs:     
    
                                   Vital Signs    
    
    
    
Temperature  98.1 F   08/10/23 16:53    
     
Pulse Rate  123 H  08/10/23 16:53    
     
Respiratory Rate  22   08/10/23 16:53    
     
Blood Pressure  160/94 H  08/10/23 16:53    
     
Pulse Oximetry  97   08/10/23 16:53    
     
Oxygen Delivery Method  Room Air  08/10/23 16:53    
    
    
                                            
    
    
    
Temperature  98.1 F   08/10/23 16:53    
     
Pulse Rate  123 H  08/10/23 16:53    
     
Respiratory Rate  22   08/10/23 16:53    
     
Blood Pressure  160/94 H  08/10/23 16:53    
     
Pulse Oximetry  97   08/10/23 16:53    
     
Oxygen Delivery Method  Room Air  08/10/23 16:53    
    
    
    
    
    
MDM - Abdominal Pain    
MDM Narrative    
Medical decision making narrative:     
patient was medicated with IV morphine, Zofran, fluids. Additional Pepcid and   
Levsin were given. Lab studies show white blood cell count is elevated, lactic   
acid is elevated. Records were obtained from outside facility showing that the   
patient was complaining of abdominal cramping and had a noncontrast CT last   
week. This was unremarkable. Patient had already been sent for CT of the abdomen  
and pelvis with IV contrast, the radiologist called to give a verbal report with  
concern for mesenteric ischemia. Due to the patient's clinical picture, we do   
not have vascular services at this facility and he will require transfer to   
tertiary care for evaluation. Additional Dilaudid given for pain control.   
Patient requests transfer to [facility].    
Discussed with Tonya MCBRIDE-NP ([id number]) who accepted the patient for transfer   
for Dr. Mims. patient is mildly tachycardic at this time with stable blood   
pressure. Critical care time thirty-five minutes.    
Medical Records    
Attestation: I reviewed the patient's medical records.    
Lab Data    
Attestation: I reviewed the patient's lab results.    
Labs:     
    
                                   Lab Results    
    
    
    
  08/10/23 08/10/23 Range/Units    
    
  17:03 17:15     
     
WBC   16.6 H  (4.0-11.0)  10^3/uL    
     
RBC   5.07  (4.70-6.10)  10^6/uL    
     
Hgb   14.2  (14.0-18.0)  g/dL    
     
Hct   42.2  (42.0-54.0)  %    
     
MCV   83.2  (80.0-94.0)  fL    
     
MCH   28.0  (25.9-34.0)  pg    
     
MCHC   33.6  (29.9-35.2)  g/dL    
     
RDW   13.2  (11.0-15.0)  %    
     
Plt Count   236  (150-450)  10^3/uL    
     
MPV   11.0  (9.5-13.5)  fL    
     
Neut % (Auto)   86.8 H  (43.0-75.0)  %    
     
Lymph % (Auto)   7.4 L  (20.5-60.0)  %    
     
Mono % (Auto)   4.6  (1.7-12.0)  %    
     
Eos % (Auto)   0.3 L  (0.9-7.0)  %    
     
Baso % (Auto)   0.3  (0.2-2.0)  %    
     
Neut # (Auto)   14.5 H  (1.4-6.5)  10^3/uL    
     
Lymph # (Auto)   1.2  (1.2-3.8)  10^3/uL    
     
Mono # (Auto)   0.8  (0.3-0.8)  10^3/uL    
     
Eos # (Auto)   0.1  (0.0-0.7)  10^3/uL    
     
Baso # (Auto)   0.1  (0.0-0.1)  10^3/uL    
     
Abs Immat Gran (auto)   0.10 H  (0.00-0.03)  10^3/uL    
     
Imm/Tot Granulo (auto)   0.6 H  (0.0-0.5)  %    
     
Sodium   136  (136-145)  mmol/L    
     
Potassium   3.7  (3.5-5.1)  mmol/L    
     
Chloride   97 L  ()  mmol/L    
     
Carbon Dioxide   25.9  (21.0-32.0)  mmol/L    
     
Anion Gap   16.8      
     
BUN   9.0  (7.0-18.0)  mg/dL    
     
Creatinine   1.26  (0.70-1.30)  mg/dL    
     
Est GFR ( Amer)   >60  (>=60)      
     
Est GFR (Non-Af Amer)   58 L  (>=60)      
     
BUN/Creatinine Ratio   7.1      
     
Glucose   380 H  ()  mg/dL    
     
Lactate   3.0 H*  (0.4-2.0)  mmol/L    
     
Calcium   8.8  (8.5-10.1)  mg/dL    
     
Total Bilirubin   0.9  (0.2-1.0)  mg/dL    
     
AST   27  (15-37)  U/L    
     
ALT   30  (16-63)  U/L    
     
Alkaline Phosphatase   99  ()  U/L    
     
Troponin I High Sens   4.6  (4.0-76.1)  pg/mL    
     
Total Protein   7.6  (6.4-8.2)  g/dL    
     
Albumin   3.1 L  (3.4-5.0)  g/dL    
     
Globulin   4.5  g/dL    
     
Albumin/Globulin Ratio   0.7      
     
Lipase   98.0  (73.0-393.0)  U/L    
     
POC Glucose  293 H   ()  mg/dL    
    
    
    
    
Imaging Data    
CT scan - chest:     
      Attestation: I have reviewed the pertinent imaging results.    
      Radiologist's impression:     
Procedure: CT abdomen pelvis w con    
EXAM: CT abdomen pelvis w con    
HISTORY: Abdominal pain    
COMPARISON: None.    
TECHNIQUE: Axial CT imaging was performed through the abdomen and pelvis with    
intravenous contrast. Multiplanar reformats were performed. Dose reduction    
techniques were achieved by using automated exposure control and/or adjustment    
of mA and/or kV according to patient size and/or use of iterative    
reconstruction technique.    
FINDINGS:    
Lung bases: There are few patchy groundglass pulmonary nodules in the location    
of the left lower lobe, nonspecific and may represent aspiration pneumonia.    
GI upper: Small hiatal hernia.    
Liver: Hepatic steatosis. Normal size and contour.    
Gallbladder: No significant abnormality. No cholelithiasis.    
Biliary system: No intra or extrahepatic biliary ductal dilatation.    
Spleen: Normal size.    
Pancreas: Unremarkable.    
Adrenal glands: Normal adrenal glands.    
Kidneys/ureters: Normal contours. No hydronephrosis. Nonobstructing 0.4 cm left    
renal stone.    
Vessels: No aneurysm. No definite venous thrombus or arterial occlusion is    
noted.    
Lymph Nodes: No lymphadenopathy.    
Small bowel: Moderate to severe circumferential wall thickening of the proximal    
small bowel loops with surrounding fat stranding and vasa recta engorgement    
with possible transition point in the left lower abdomen (series 5, image 44    
and series 3, image 93) and collapse of the distal bowel loops. There appears    
internal hernia and vascular narrowing, resulting in bowel ischemia (venous    
type).    
Colon: No wall thickening or dilatation.    
Appendix: No findings of appendicitis.    
Peritoneal cavity: No pneumoperitoneum. Small perihepatic and left greater than    
right paracolic gutter ascites.    
Lower : Unremarkable.    
Bones: No acute bony abnormality.    
Soft tissues: No acute finding.    
Additional findings: None.    
IMPRESSION:    
few patchy groundglass pulmonary nodules in the location of the left lower    
lobe, nonspecific and may represent aspiration pneumonia.    
Small hiatal hernia.    
Moderate to severe circumferential wall thickening of the proximal small bowel    
loops with surrounding fat stranding and vasa recta engorgement with possible    
transition point in the left lower abdomen and collapse of the distal bowel    
loops. There appears internal hernia and vascular narrowing, resulting in bowel    
ischemia (venous type).    
Small perihepatic and left greater than right paracolic gutter ascites.    
Severe hepatic steatosis.    
Nonobstructing 0.4 cm left renal stone.    
Critical results were NOTIFIED by TELEPHONE BY Dr. Lizzeth Potts MD to [facility]    
At 8/10/2023 6:26 PM EDT.    
Electronically authenticated by: LIZZETH POTTS Date: 8/10/2023 18:44    
ECG Data    
Attestation: I personally reviewed and interpreted this ECG as follows: (sinus   
tachycardia at a rate of 119, no acute ST elevation or ectopy. EKG reviewed by   
attending physician)    
ECG interpretation date: 08/10/23    
ECG interpretation time: 17:07    
    
Critical Care Time    
Critical Care Time    
Critical Care Time: Yes    
Total Critical Care Time: 35    
Attestation:     
35min critical care time    
    
Discharge Plan    
Discharge    
Chief Complaint: Abdominal Pain    
    
Clinical Impression:    
 Acute mesenteric ischemia, Abdominal pain, Acute hyperglycemia    
    
    
Patient Disposition: [facility]    
    
Time of Disposition Decision: 19:31    
    
Discharge Location: [facility]    
    
Referrals:    
DINH LIN [Primary Care Provider] - 1 week

## 2023-08-10 NOTE — CT_ITS
[address] [address] 
     [phone] 
  
  
Patient Name: 
NICK BURTON 
  
MRN: [id number]    YOB: 1965    Sex: M 
Assigned Patient Location: ER 
Current Patient Location:  
Accession/Order Number: [id number] 
Exam Date: 8/10/2023  17:57    Report Date: 8/10/2023  18:44 
  
At the request of: 
NADINE BAJWA   
  
Procedure:  CT abdomen pelvis w con 
  
EXAM: CT abdomen pelvis w con  
  
HISTORY: Abdominal pain 
  
COMPARISON: None.  
  
TECHNIQUE: Axial CT imaging was performed through the abdomen and pelvis with  
intravenous contrast. Multiplanar reformats were performed. Dose reduction  
techniques were achieved by using automated exposure control and/or adjustment  
  
of mA and/or kV according to patient size and/or use of iterative  
reconstruction technique. 
  
FINDINGS: 
Lung bases: There are few patchy groundglass pulmonary nodules in the location  
  
of the left lower lobe, nonspecific and may represent aspiration pneumonia. 
GI upper: Small hiatal hernia. 
  
Liver: Hepatic steatosis. Normal size and contour. 
Gallbladder: No significant abnormality. No cholelithiasis. 
Biliary system: No intra or extrahepatic biliary ductal dilatation. 
Spleen: Normal size. 
Pancreas: Unremarkable. 
  
Adrenal glands: Normal adrenal glands. 
Kidneys/ureters: Normal contours. No hydronephrosis. Nonobstructing 0.4 cm  
left  
renal stone. 
  
Vessels: No aneurysm. No definite venous thrombus or arterial occlusion is  
noted. 
Lymph Nodes: No lymphadenopathy. 
  
Small bowel: Moderate to severe circumferential wall thickening of the  
proximal  
small bowel loops with surrounding fat stranding and vasa recta engorgement  
with possible transition point in the left lower abdomen (series 5, image 44  
and series 3, image 93) and collapse of the distal bowel loops. There appears  
internal hernia and vascular narrowing, resulting in bowel ischemia (venous  
type). 
Colon: No wall thickening or dilatation. 
Appendix: No findings of appendicitis. 
Peritoneal cavity: No pneumoperitoneum. Small perihepatic and left greater  
than  
right paracolic gutter ascites. 
  
Lower : Unremarkable.  
  
Bones: No acute bony abnormality.  
Soft tissues: No acute finding. 
  
Additional findings: None. 
  
ORDER #: [id number] CT/CT abdomen pelvis w con  
IMPRESSION:  
few patchy groundglass pulmonary nodules in the location of the left lower   
lobe, nonspecific and may represent aspiration pneumonia.  
Small hiatal hernia.  
Moderate to severe circumferential wall thickening of the proximal small bowel 
  
   
loops with surrounding fat stranding and vasa recta engorgement with possible   
transition point in the left lower abdomen and collapse of the distal bowel   
loops. There appears internal hernia and vascular narrowing, resulting in   
bowel   
ischemia (venous type).  
Small perihepatic and left greater than right paracolic gutter ascites.  
Severe hepatic steatosis.  
Nonobstructing 0.4 cm left renal stone.  
   
Critical results were NOTIFIED by TELEPHONE BY Dr. Lizzeth Singer MD to   
[facility]   
At 8/10/2023 6:26 PM EDT.  
   
   
Electronically authenticated by: LIZZETH SINGER   Date: 8/10/2023  18:44

## 2023-08-10 NOTE — ECG_ITS
[facility] 
                                        
                                       Test Date:    2023-08-10 
Pat Name:     NICK BURTON            Department:    
Patient ID:   [id number]               Room:         - 
Gender:       Male                     Technician:    
:          1965               Requested By: [id number] 
Order Number: [id number]              Reading MD:   JACQUELINE WOODS 
                                 Measurements 
Intervals                              Axis           
Rate:         119                      P:            68 
IA:           150                      QRS:          43 
QRSD:         102                      T:            49 
QT:           336                                     
QTc:          407                                     
                           Interpretive Statements 
1120 Sinus tachycardia 
9140 **  abnormal rhythm ECG  ** 
No previous ECG available for comparison 
Electronically Signed On 2023 7:00:25 EDT by JACQUELINE WOODS

## 2023-08-11 VITALS — OXYGEN SATURATION: 95 % | RESPIRATION RATE: 54 BRPM | HEART RATE: 93 BPM

## 2023-08-11 VITALS
HEART RATE: 97 BPM | SYSTOLIC BLOOD PRESSURE: 154 MMHG | DIASTOLIC BLOOD PRESSURE: 97 MMHG | RESPIRATION RATE: 29 BRPM | OXYGEN SATURATION: 93 %

## 2023-08-11 VITALS
DIASTOLIC BLOOD PRESSURE: 83 MMHG | RESPIRATION RATE: 20 BRPM | SYSTOLIC BLOOD PRESSURE: 157 MMHG | HEART RATE: 91 BPM | OXYGEN SATURATION: 92 %

## 2023-08-11 VITALS — HEART RATE: 90 BPM | OXYGEN SATURATION: 94 % | RESPIRATION RATE: 17 BRPM

## 2023-08-11 VITALS — OXYGEN SATURATION: 92 % | HEART RATE: 88 BPM | RESPIRATION RATE: 23 BRPM

## 2023-08-11 VITALS — HEART RATE: 87 BPM | RESPIRATION RATE: 20 BRPM | OXYGEN SATURATION: 94 %

## 2023-08-11 VITALS
DIASTOLIC BLOOD PRESSURE: 123 MMHG | RESPIRATION RATE: 18 BRPM | OXYGEN SATURATION: 93 % | HEART RATE: 91 BPM | SYSTOLIC BLOOD PRESSURE: 173 MMHG

## 2023-08-11 LAB
CAST SEEN?: (no result) #/LPF
GLUCOSE URINE UA: >=1000 MG/DL
URINE CULTURE INDICATED: NO

## 2023-09-04 PROBLEM — E86.0 DEHYDRATION: Status: RESOLVED | Noted: 2023-08-05 | Resolved: 2023-09-04

## 2023-09-26 ENCOUNTER — HOSPITAL ENCOUNTER (OUTPATIENT)
Dept: HOSPITAL 101 - ER | Age: 58
Setting detail: OBSERVATION
LOS: 2 days | Discharge: HOME | End: 2023-09-28
Payer: COMMERCIAL

## 2023-09-26 VITALS — RESPIRATION RATE: 16 BRPM | HEART RATE: 85 BPM | OXYGEN SATURATION: 96 %

## 2023-09-26 VITALS — BODY MASS INDEX: 29 KG/M2 | BODY MASS INDEX: 28.2 KG/M2

## 2023-09-26 VITALS
RESPIRATION RATE: 20 BRPM | HEART RATE: 88 BPM | DIASTOLIC BLOOD PRESSURE: 71 MMHG | SYSTOLIC BLOOD PRESSURE: 116 MMHG | OXYGEN SATURATION: 99 %

## 2023-09-26 VITALS — OXYGEN SATURATION: 96 % | HEART RATE: 97 BPM | RESPIRATION RATE: 17 BRPM

## 2023-09-26 VITALS — HEART RATE: 99 BPM | RESPIRATION RATE: 20 BRPM

## 2023-09-26 VITALS
OXYGEN SATURATION: 96 % | HEART RATE: 93 BPM | DIASTOLIC BLOOD PRESSURE: 73 MMHG | SYSTOLIC BLOOD PRESSURE: 114 MMHG | RESPIRATION RATE: 18 BRPM

## 2023-09-26 VITALS — HEART RATE: 95 BPM | RESPIRATION RATE: 19 BRPM

## 2023-09-26 VITALS — RESPIRATION RATE: 20 BRPM | HEART RATE: 97 BPM

## 2023-09-26 VITALS — OXYGEN SATURATION: 97 % | RESPIRATION RATE: 20 BRPM

## 2023-09-26 VITALS — HEART RATE: 94 BPM | OXYGEN SATURATION: 86 % | RESPIRATION RATE: 13 BRPM

## 2023-09-26 VITALS — DIASTOLIC BLOOD PRESSURE: 69 MMHG | RESPIRATION RATE: 22 BRPM | HEART RATE: 98 BPM | SYSTOLIC BLOOD PRESSURE: 102 MMHG

## 2023-09-26 VITALS
RESPIRATION RATE: 16 BRPM | DIASTOLIC BLOOD PRESSURE: 70 MMHG | OXYGEN SATURATION: 97 % | SYSTOLIC BLOOD PRESSURE: 115 MMHG | HEART RATE: 97 BPM | TEMPERATURE: 97.88 F

## 2023-09-26 VITALS
SYSTOLIC BLOOD PRESSURE: 121 MMHG | HEART RATE: 95 BPM | RESPIRATION RATE: 19 BRPM | OXYGEN SATURATION: 99 % | DIASTOLIC BLOOD PRESSURE: 81 MMHG

## 2023-09-26 VITALS
SYSTOLIC BLOOD PRESSURE: 108 MMHG | DIASTOLIC BLOOD PRESSURE: 72 MMHG | RESPIRATION RATE: 17 BRPM | HEART RATE: 92 BPM | OXYGEN SATURATION: 97 %

## 2023-09-26 VITALS — OXYGEN SATURATION: 96 % | RESPIRATION RATE: 19 BRPM | HEART RATE: 90 BPM

## 2023-09-26 VITALS — OXYGEN SATURATION: 96 % | HEART RATE: 96 BPM | RESPIRATION RATE: 23 BRPM

## 2023-09-26 VITALS — RESPIRATION RATE: 19 BRPM | OXYGEN SATURATION: 98 % | HEART RATE: 94 BPM

## 2023-09-26 VITALS — RESPIRATION RATE: 26 BRPM | HEART RATE: 99 BPM

## 2023-09-26 VITALS — RESPIRATION RATE: 19 BRPM | HEART RATE: 102 BPM

## 2023-09-26 VITALS — RESPIRATION RATE: 17 BRPM | OXYGEN SATURATION: 95 % | HEART RATE: 89 BPM

## 2023-09-26 VITALS — HEART RATE: 97 BPM

## 2023-09-26 VITALS — RESPIRATION RATE: 18 BRPM | HEART RATE: 91 BPM | OXYGEN SATURATION: 96 %

## 2023-09-26 VITALS — HEART RATE: 98 BPM | RESPIRATION RATE: 15 BRPM

## 2023-09-26 VITALS
OXYGEN SATURATION: 95 % | SYSTOLIC BLOOD PRESSURE: 104 MMHG | HEART RATE: 87 BPM | RESPIRATION RATE: 16 BRPM | DIASTOLIC BLOOD PRESSURE: 68 MMHG

## 2023-09-26 VITALS — RESPIRATION RATE: 18 BRPM | OXYGEN SATURATION: 97 % | HEART RATE: 89 BPM

## 2023-09-26 VITALS — RESPIRATION RATE: 16 BRPM | HEART RATE: 90 BPM | OXYGEN SATURATION: 96 %

## 2023-09-26 VITALS — HEART RATE: 95 BPM | OXYGEN SATURATION: 95 % | RESPIRATION RATE: 23 BRPM

## 2023-09-26 VITALS — OXYGEN SATURATION: 75 %

## 2023-09-26 DIAGNOSIS — E11.65: ICD-10-CM

## 2023-09-26 DIAGNOSIS — L97.509: ICD-10-CM

## 2023-09-26 DIAGNOSIS — Z79.4: ICD-10-CM

## 2023-09-26 DIAGNOSIS — F32.A: ICD-10-CM

## 2023-09-26 DIAGNOSIS — R53.1: Primary | ICD-10-CM

## 2023-09-26 DIAGNOSIS — K21.9: ICD-10-CM

## 2023-09-26 DIAGNOSIS — Z91.81: ICD-10-CM

## 2023-09-26 DIAGNOSIS — Z90.49: ICD-10-CM

## 2023-09-26 DIAGNOSIS — E78.5: ICD-10-CM

## 2023-09-26 DIAGNOSIS — L89.890: ICD-10-CM

## 2023-09-26 DIAGNOSIS — M48.00: ICD-10-CM

## 2023-09-26 DIAGNOSIS — F41.9: ICD-10-CM

## 2023-09-26 DIAGNOSIS — G47.33: ICD-10-CM

## 2023-09-26 DIAGNOSIS — Z79.899: ICD-10-CM

## 2023-09-26 DIAGNOSIS — I10: ICD-10-CM

## 2023-09-26 DIAGNOSIS — E11.40: ICD-10-CM

## 2023-09-26 DIAGNOSIS — E11.621: ICD-10-CM

## 2023-09-26 LAB
ADD MANUAL DIFF: NO
ALANINE AMINOTRANSFERASE: 32 U/L (ref 16–63)
ALBUMIN GLOBULIN RATIO: 0.7
ALBUMIN LEVEL: 2.4 G/DL (ref 3.4–5)
ALKALINE PHOSPHATASE: 127 U/L (ref 46–116)
ANION GAP: 10.5
ASPARTATE AMINO TRANSFERASE: 24 U/L (ref 15–37)
BLOOD UREA NITROGEN: 8 MG/DL (ref 7–18)
CALCIUM: 7.4 MG/DL (ref 8.5–10.1)
CARBON DIOXIDE: 26 MMOL/L (ref 21–32)
CAST SEEN?: (no result) #/LPF
CHLORIDE: 108 MMOL/L (ref 98–107)
CO2 BLD-SCNC: 26 MMOL/L (ref 21–32)
ESTIMATED GFR (AFRICAN AMERICA: >60 (ref 60–?)
ESTIMATED GFR (NON-AFRICAN AME: >60 (ref 60–?)
GLOBULIN: 3.6 G/DL
GLUCOSE BLD-MCNC: 163 MG/DL (ref 74–106)
GLUCOSE URINE UA: NEGATIVE MG/DL
HCT VFR BLD CALC: 32.4 % (ref 42–54)
HEMATOCRIT: 32.4 % (ref 42–54)
HEMOGLOBIN: 9.8 G/DL (ref 14–18)
IMMATURE GRANULOCYTES ABS AUTO: 0.03 10^3/UL (ref 0–0.03)
IMMATURE GRANULOCYTES PCT AUTO: 0.4 % (ref 0–0.5)
LACTATE/LACTIC ACID: 1.8 MMOL/L (ref 0.4–2)
LYMPHOCYTES  ABSOLUTE AUTO: 1.6 10^3/UL (ref 1.2–3.8)
MCV RBC: 93.6 FL (ref 80–94)
MEAN CORPUSCULAR HEMOGLOBIN: 28.3 PG (ref 25.9–34)
MEAN CORPUSCULAR HGB CONC: 30.2 G/DL (ref 29.9–35.2)
MEAN CORPUSCULAR VOLUME: 93.6 FL (ref 80–94)
PLATELET # BLD: 181 10^3/UL (ref 150–450)
PLATELET COUNT: 181 10^3/UL (ref 150–450)
POTASSIUM SERPLBLD-SCNC: 3.5 MMOL/L (ref 3.5–5.1)
POTASSIUM: 3.5 MMOL/L (ref 3.5–5.1)
RED BLOOD COUNT: 3.46 10^6/UL (ref 4.7–6.1)
SODIUM BLD-SCNC: 141 MMOL/L (ref 136–145)
SODIUM: 141 MMOL/L (ref 136–145)
TOTAL PROTEIN: 6 G/DL (ref 6.4–8.2)
WBC # BLD: 7 10^3/UL (ref 4–11)
WHITE BLOOD COUNT: 7 10^3/UL (ref 4–11)

## 2023-09-26 PROCEDURE — 85652 RBC SED RATE AUTOMATED: CPT

## 2023-09-26 PROCEDURE — 72100 X-RAY EXAM L-S SPINE 2/3 VWS: CPT

## 2023-09-26 PROCEDURE — 87040 BLOOD CULTURE FOR BACTERIA: CPT

## 2023-09-26 PROCEDURE — 94660 CPAP INITIATION&MGMT: CPT

## 2023-09-26 PROCEDURE — 36415 COLL VENOUS BLD VENIPUNCTURE: CPT

## 2023-09-26 PROCEDURE — 97161 PT EVAL LOW COMPLEX 20 MIN: CPT

## 2023-09-26 PROCEDURE — 96366 THER/PROPH/DIAG IV INF ADDON: CPT

## 2023-09-26 PROCEDURE — 80053 COMPREHEN METABOLIC PANEL: CPT

## 2023-09-26 PROCEDURE — 70450 CT HEAD/BRAIN W/O DYE: CPT

## 2023-09-26 PROCEDURE — 84443 ASSAY THYROID STIM HORMONE: CPT

## 2023-09-26 PROCEDURE — 83036 HEMOGLOBIN GLYCOSYLATED A1C: CPT

## 2023-09-26 PROCEDURE — 82948 REAGENT STRIP/BLOOD GLUCOSE: CPT

## 2023-09-26 PROCEDURE — 86140 C-REACTIVE PROTEIN: CPT

## 2023-09-26 PROCEDURE — 83605 ASSAY OF LACTIC ACID: CPT

## 2023-09-26 PROCEDURE — 96365 THER/PROPH/DIAG IV INF INIT: CPT

## 2023-09-26 PROCEDURE — 85025 COMPLETE CBC W/AUTO DIFF WBC: CPT

## 2023-09-26 PROCEDURE — 93005 ELECTROCARDIOGRAM TRACING: CPT

## 2023-09-26 PROCEDURE — 93306 TTE W/DOPPLER COMPLETE: CPT

## 2023-09-26 PROCEDURE — 80048 BASIC METABOLIC PNL TOTAL CA: CPT

## 2023-09-26 PROCEDURE — 81001 URINALYSIS AUTO W/SCOPE: CPT

## 2023-09-26 PROCEDURE — 71045 X-RAY EXAM CHEST 1 VIEW: CPT

## 2023-09-26 PROCEDURE — G0378 HOSPITAL OBSERVATION PER HR: HCPCS

## 2023-09-26 PROCEDURE — 99285 EMERGENCY DEPT VISIT HI MDM: CPT

## 2023-09-26 PROCEDURE — 72125 CT NECK SPINE W/O DYE: CPT

## 2023-09-26 PROCEDURE — 93880 EXTRACRANIAL BILAT STUDY: CPT

## 2023-09-26 PROCEDURE — 97165 OT EVAL LOW COMPLEX 30 MIN: CPT

## 2023-09-26 PROCEDURE — 97530 THERAPEUTIC ACTIVITIES: CPT

## 2023-09-26 PROCEDURE — 84484 ASSAY OF TROPONIN QUANT: CPT

## 2023-09-26 PROCEDURE — 73620 X-RAY EXAM OF FOOT: CPT

## 2023-09-26 PROCEDURE — 96372 THER/PROPH/DIAG INJ SC/IM: CPT

## 2023-09-26 RX ADMIN — AMPICILLIN SODIUM AND SULBACTAM SODIUM 200 GM: 2; 1 INJECTION, POWDER, FOR SOLUTION INTRAMUSCULAR; INTRAVENOUS at 23:55

## 2023-09-26 RX ADMIN — SODIUM CHLORIDE 125 ML: 900 INJECTION, SOLUTION INTRAVENOUS at 22:08

## 2023-09-26 NOTE — XR_ITS
The [address] [address] 
     [phone] 
  
  
Patient Name: 
NICK BURTON 
  
MRN: [id number]    YOB: 1965    Sex: M 
Assigned Patient Location: ER 
Current Patient Location: .[facility] 
Accession/Order Number: [id number] 
Exam Date: 9/26/2023  20:50    Report Date: 9/26/2023  22:23 
  
At the request of: 
GARRETT  MARKER   
  
Procedure:  XR foot JORDAN 2V 
  
EXAM: XR foot JORDAN 2V  
  
HISTORY: plantar wounds r/o osteo 
  
COMPARISON: None.  
  
TECHNIQUE: 2 views were performed the right foot and 2 views were performed of  
  
the left foot. 
  
FINDINGS:  
  
RIGHT FOOT: There is no acute fracture or dislocation. There are mild  
degenerative changes of first metatarsal phalangeal joint. There is a small  
plantar calcaneal spur. There is no definite osseous destruction to suggest  
osteomyelitis. There is mild osteopenia. 
  
LEFT FOOT: There is no acute fracture or dislocation. There is a small plantar  
  
calcaneal spur. There is mild osteopenia and mild degenerative changes of the  
first metatarsal phalangeal joint. No definite osseous destruction is  
identified. 
  
ORDER #: [id number] XR/XR foot JORDAN 2V  
IMPRESSION:   
   
1. No definite acute osseous abnormality or radiographic evidence of   
osteomyelitis however MRI would be more sensitive.  
   
2. Small plantar calcaneal spurs and mild degenerative changes of the first   
metatarsophalangeal joints bilaterally.  
   
3. Osteopenia.  
   
   
Electronically authenticated by: BARON WILSON   Date: 9/26/2023  22:23

## 2023-09-26 NOTE — XR_ITS
The [address] [address] 
     [phone] 
  
  
Patient Name: 
NICK BURTON 
  
MRN: [id number]    YOB: 1965    Sex: M 
Assigned Patient Location: ER 
Current Patient Location: ED.MAIN 
Accession/Order Number: [id number] 
Exam Date: 9/26/2023  20:50    Report Date: 9/26/2023  22:23 
  
At the request of: 
GARRETT  MARKER   
  
Procedure:  XR chest 1V 
  
EXAM: XR chest 1V  
  
HISTORY: weakness 
  
COMPARISON: Chest radiograph 12/18/2022.  
  
TECHNIQUE: One view was performed. 
  
FINDINGS: The cardiomediastinal silhouette appears normal. Lung volumes are  
low. There is no focal consolidation, pleural effusion or pneumothorax. There  
is a stable 2.3 similar calcified left hilar lymph node and there are 2 small  
calcified granulomas in the left lower lobe. Bones and soft tissues appear  
unremarkable. 
  
ORDER #: [id number] XR/XR chest 1V  
IMPRESSION:   
   
No acute cardiopulmonary process.  
   
   
Electronically authenticated by: BARON WILSON   Date: 9/26/2023  22:23

## 2023-09-26 NOTE — ECG_ITS
[facility] 
                                        
                                       Test Date:    2023 
Pat Name:     NICK BURTON            Department:    
Patient ID:   [id number]               Room:         - 
Gender:       Male                     Technician:    
:          1965               Requested By: [id number] 
Order Number: [id number]              Reading MD:   JACQUELINE WOODS 
                                 Measurements 
Intervals                              Axis           
Rate:         100                      P:            123 
TX:           148                      QRS:          193 
QRSD:         92                       T:            150 
QT:           358                                     
QTc:          413                                     
                           Interpretive Statements 
Sinus tachycardia 
7100 Abnormal right axis deviation 
0101 Possible arm leads reversed, check lead requested 
9140 **  abnormal rhythm ECG  ** 
Compared to ECG 08/10/2023 17:01:36 
Right-axis deviation now present 
Electronically Signed On 2023 6:59:15 EDT by JACQUELINE WOODS

## 2023-09-26 NOTE — ED.WEAKNESS1
HPI - Weakness
General
Chief complaint: Weakness
Stated complaint: Falls
Time Seen by Provider: 09/26/23 20:14
Source: patient
Mode of arrival: ambulance
Limitations: no limitations
History of Present Illness
HPI Narrative: 
58-year-old male comes diabetic who recently was transferred from this facility to [facility] on August 10 for evaluation of possible mesenteric ischemia and underwent abdominal surgery and spent 2 weeks in the hospital and was recently 
discharged home is brought to the emergency department by EMS from home after he had 3 falls today. The patient states his legs are just giving out from under him. He fell into mulch at his house and is covered with dirt from the mulch and fell onto 
his knee and skinned his knee. He also fell once and struck his face on the ground. He denies any loss of consciousness. He has no neck pain. He does complain of low back pain. He has no focal weakness numbness or tingling but states he just feels 
weak. He is being evaluated by podiatry in the future for ulcers on  her aspect of both feet, right greater than left. He has dry wraps on the feet at this time. There has been some drainage on the dressings according to him but he has not noticed 
any foul odor . He states he is very thirsty. He denies any chest pain or shortness of breath. He does have a history of reflux and has ongoing epigastric abdominal pain at times. States he lives by himself.  He was helping a friend to get gasoline 
for his car after he ran out of gas and also went to the grocery store with his daughter today before becoming extremely weak and having two of his falls. He states that the last time he fell he will was unable to get himself up so called EMS. He is 
currently giving himself Lovenox injections. He denies a history of any kidney disease.

Related Data
Home Medications

 Medication  Instructions  Recorded  Confirmed
amlodipine 10 mg tablet 10 mg PO DAILY 09/26/23 09/27/23
atorvastatin 40 mg tablet 40 mg PO DAILY 09/26/23 09/27/23
buspirone 10 mg tablet 10 mg PO BID 09/26/23 09/27/23
enoxaparin 100 mg/mL subcutaneous 100 mg subcut Q12H 09/26/23 09/27/23
syringe   
gabapentin 100 mg capsule 100 mg PO Q8H 09/26/23 09/27/23
losartan 25 mg tablet 25 mg PO DAILY 09/26/23 09/27/23
buspirone 30 mg tablet 30 mg PO BID 09/27/23 09/27/23
insulin glargine 100 unit/mL (3 unit subcut 09/27/23 
mL) subcutaneous pen (Lantus   
Solostar U-100 Insulin)   
insulin lispro 100 unit/mL subcut 09/27/23 
subcutaneous pen   
mirtazapine 30 mg tablet 30 mg PO DAILY 09/27/23 09/27/23
omeprazole 40 mg capsule,delayed 40 mg PO DAILY 09/27/23 09/27/23
release   
potassium chloride 20 mEq 20 meq PO DAILY 09/27/23 09/27/23
tablet,extended release(part/cryst)   
venlafaxine 150 mg 150 mg PO BID 09/27/23 09/27/23
capsule,extended release 24 hr   


Allergies

Allergy/AdvReac Type Severity Reaction Status Date / Time
No Known Drug Allergies Allergy   Verified 09/26/23 20:14



Review of Systems
ROS  
 Status of ROS 10 or more systems reviewed and unremarkable except as noted in history and below   

[facility]
Medical History (Updated 09/27/23 @ 01:19 by Paty Patel RN)



Surgical History (Updated 09/27/23 @ 01:20 by Paty Patel, JOSE ARMANDO)



Family History (Updated 09/27/23 @ 01:22 by Paty Patel, RN)
Brother
 Family history of stroke
Other
 Family history of hypertension



Social History (Updated 09/27/23 @ 01:25 by Paty Patel, RN)
Within the past year, how often did you have a drink containing alcohol:  never 
Score interpretation:  A score less than 4 is consistent with normal alcohol consumption. 
Smoking status:  Never smoker 
Non-prescribed substance use:  former substance user 
Non-prescribed substance use details:  patient states drug use  back in the 80s  
Previous occupational history:  on disability 
Highest level of school completed/degree received:  Associate degree: academic program 
In a typical week, how many times do you talk on the telephone with family, friends, or neighbors:  once per week 
How often do you get together with friends or relatives:  once per week 
Little interest or pleasure in doing things:  several days 
Feeling down, depressed, or hopeless:  several days 
Feel stressed/tense/nervous/anxious/difficulty sleeping:  to some extent 
Do you think of yourself as:  straight/heterosexual 
Gender Identity:  male 



Exam
Narrative
Exam Narrative: 
Nurses note and vital signs reviewed and patient is not hypoxic. Rectal temperature was performed and he is afebrile

General: Nontoxic, poorly kempt adult male, GCS 15, no respiratory distress
Skin: Skin is pale, dry with scattered areas of ecchymosis
Head:  Normocephalic, atraumatic, Abrasion over bridge of nose
Eye: Normal conjunctiva, no drainage, EOMI. PERRL
Ears, Nose, Mouth, and Throat: oral mucosa is dry. Nares patent. Mouth without vesicles. 
Cardiovascular:  Regular Rate and Rhythm S1 and S2, pulses are brisk and equal bilaterally
Respiratory:  Patient is in no distress, no accessory muscle use, lungs are clear to auscultation, no wheezing, rales or rhonchi
Back:  non-tender, no CVA tenderness bilaterally to percussion.
GI:  Normal bowel sounds, Midline abdominal incision, no hernia appreciated, nontender, no sign of abdominal wall cellulitis
Musculoskeletal: The patient has no evidence of calf tenderness, No pitting edema noted, there is a large black eschar on the base of the right foot is approximately 3 x 4 cm, it is dry, there is no drainage noted, there are 2 approximately 1 cm 
eschars on the base of the left foot again without drainage or local cellulitis, lower extremity pulses are brisk and equal bilaterally, feet are warm and sensate
Neurological:  A&O x4, normal speech, No focal deficits, patient easily pulls himself to a sitting position, lower extremity push and pull his intact,  strength is intact, speech is clear, there is no facial droop.
Psychiatric:  Cooperative
Constitutional
Vital Signs, click to edit/add: 

Last Vital Signs

Temp  98.0 F   09/27/23 01:12
Pulse  87   09/27/23 01:12
Resp  18   09/27/23 01:12
BP  122/73   09/27/23 01:12
Pulse Ox  94 L  09/27/23 01:12
O2 Del Method  Room Air  09/27/23 01:12




Course
Vital Signs
Vital signs: 

Vital Signs

Temperature  97.9 F   09/26/23 20:11
Pulse Rate  97 H  09/26/23 20:11
Respiratory Rate  16   09/26/23 20:11
Blood Pressure  115/70   09/26/23 20:11
Pulse Oximetry  97   09/26/23 20:11
Oxygen Delivery Method  Room Air  09/26/23 20:11



Temperature  98.0 F   09/27/23 01:12
Pulse Rate  87   09/27/23 01:12
Respiratory Rate  18   09/27/23 01:12
Blood Pressure  122/73   09/27/23 01:12
Pulse Oximetry  94 L  09/27/23 01:12
Oxygen Delivery Method  Room Air  09/27/23 01:12




MDM - Weakness
MDM Narrative
Medical decision making narrative: 
58-year-old male with a history of diabetes who recently underwent abdominal surgery at [facility] after he was seen in this emergency department and thought to possibly have mesenteric ischemia with circumferential wall thickening of the 
proximal small bowel loops with surrounding fat stranding and vasa recta engorgement with possible transition point in the left lower abdomen and collapse of distal bowel loops with an internal hernia and vascular narrowing resulting in bowel 
ischemia of venous type is brought to the emergency department by EMS from home. The patient was hospitalized for approximately 2 weeks at [facility] and discharged home recently. He lives alone. He has been following up with his family 
physician and seeing her for wound changes on both of his feet. He states that he got ulcers on his feet while he was in an  induced coma  after his surgery. He does have foot ulcers but they are not on pressure areas of his feet. He has a large 
black eschar on the plantar aspect of the right foot that is not draining or foul-smelling and 2 smaller black eschars on the base of the left foot that are also not draining. There is minimal local erythema or cellulitic changes. There was nothing 
to culture. The patient states that he had a busy day today, he went grocery shopping with his daughter and then his friend of his ran out of gas and he was helping him get gas back in his car and carrying a gas tank. He had a fall earlier in the 
day and was able to get himself back up and carry on with his day but  later in the day he fell once in the mulch and then once again outside and was unable to get up due to generalized weakness. He denies any injury related to his fall but did fall 
in the mulch and scraped up his nose. The patient has no focal neurologic deficits and does not exhibit much generalized weakness. He has no truncal weakness, no focal neurologic deficits. He has no headache or neck pain. He does complain of chronic 
back pain and thinks that his legs are giving out due to his back issues.
EKG done upon arrival was a sinus rhythm at 97 bpm  with no acute changes. An IV was placed and he was given gentle IV hydration and a septic workup was ordered 1) due to his recent hospitalization and abdominal surgery and 2) due to the diabetic 
foot ulcers on both feet and weakness. He has a normal white count today, hemoglobin is lower than it was prior to his surgery at 9.8. 2 sets of blood cultures are pending. He has a normal lactic acid. Electrolytes are normal with the exception of a 
glucose of 163. His alkaline phosphatase is mildly elevated but the remainder of his LFTs are normal. Urine does not show any sign of infection. His abdominal wall incision appears to be healing well and he does not complain of any abdominal pain. 
CT scan of the brain and cervical spine is ordered due to the generalized weakness and history of fall with head injury. These were both normal. Chest x-ray was negative for acute findings. X-rays of both feet did not show any concerning evidence of 
osteomyelitis. The patient was given 3 g of Unasyn due to the diabetic foot ulcers. He will likely require admission and podiatric consultation for the foot ulcers. He has remained awake alert and neurologically stable in the emergency department 
without any complaints. He did at one point requests something for his low back pain and was given a Norco and Zofran. The case was discussed with the hospitalist the patient is accepted for admission.
Lab Data
Labs: 

Lab Results

  09/26/23 09/26/23 Range/Units
  20:49 21:40 
WBC  7.0   (4.0-11.0)  10^3/uL
RBC  3.46 L   (4.70-6.10)  10^6/uL
Hgb  9.8 L   (14.0-18.0)  g/dL
Hct  32.4 L   (42.0-54.0)  %
MCV  93.6   (80.0-94.0)  fL
MCH  28.3   (25.9-34.0)  pg
MCHC  30.2   (29.9-35.2)  g/dL
RDW  15.7 H   (11.0-15.0)  %
Plt Count  181   (150-450)  10^3/uL
MPV  10.0   (9.5-13.5)  fL
Neut % (Auto)  70.0   (43.0-75.0)  %
Lymph % (Auto)  22.4   (20.5-60.0)  %
Mono % (Auto)  5.4   (1.7-12.0)  %
Eos % (Auto)  1.4   (0.9-7.0)  %
Baso % (Auto)  0.4   (0.2-2.0)  %
Neut # (Auto)  4.9   (1.4-6.5)  10^3/uL
Lymph # (Auto)  1.6   (1.2-3.8)  10^3/uL
Mono # (Auto)  0.4   (0.3-0.8)  10^3/uL
Eos # (Auto)  0.1   (0.0-0.7)  10^3/uL
Baso # (Auto)  0.0   (0.0-0.1)  10^3/uL
Abs Immat Gran (auto)  0.03   (0.00-0.03)  10^3/uL
Imm/Tot Granulo (auto)  0.4   (0.0-0.5)  %
Sodium  141   (136-145)  mmol/L
Potassium  3.5   (3.5-5.1)  mmol/L
Chloride  108 H   ()  mmol/L
Carbon Dioxide  26.0   (21.0-32.0)  mmol/L
Anion Gap  10.5   
BUN  8.0   (7.0-18.0)  mg/dL
Creatinine  0.88   (0.70-1.30)  mg/dL
Est GFR ( Amer)  >60   (>=60)  
Est GFR (Non-Af Amer)  >60   (>=60)  
BUN/Creatinine Ratio  9.1   
Glucose  163 H   ()  mg/dL
Lactate  1.8   (0.4-2.0)  mmol/L
Calcium  7.4 L   (8.5-10.1)  mg/dL
Total Bilirubin  0.2   (0.2-1.0)  mg/dL
AST  24   (15-37)  U/L
ALT  32   (16-63)  U/L
Alkaline Phosphatase  127 H   ()  U/L
Troponin I High Sens  5.3   (4.0-76.1)  pg/mL
Total Protein  6.0 L   (6.4-8.2)  g/dL
Albumin  2.4 L   (3.4-5.0)  g/dL
Globulin  3.6   g/dL
Albumin/Globulin Ratio  0.7   
Urine Color   Lt. yellow  (YELLOW)  
Urine Clarity   Clear  (CLEAR)  
Urine pH   5.5  (5.0-9.0)  
Ur Specific Gravity   <=1.005 A  (1.005-1.025)  
Urine Protein   Negative  (NEG/TRACE)  mg/dL
Urine Glucose (UA)   Negative  (NEGATIVE)  mg/dL
Urine Ketones   Negative  (NEGATIVE)  mg/dL
Urine Occult Blood   Negative  (NEGATIVE)  
Urine Nitrite   Negative  (NEGATIVE)  
Urine Bilirubin   Negative  (NEGATIVE)  
Urine Urobilinogen   0.2  (0.2-1.0)  EU/dL
Ur Leukocyte Esterase   Trace A  (NEGATIVE)  
Urine RBC   0-2  (0-2)  #/HPF
Urine WBC   0-2 A  (NONE SEEN)  #/HPF
Ur Squamous Epith Cells   None seen  (NONE/RARE)  #/LPF
Urine Crystals   None seen  (None Seen)  #/HPF
Urine Bacteria   None seen  (NONE SEEN)  #/HPF
Urine Casts   None seen  (NONE SEEN)  #/LPF
Urine Mucus   None seen  (NONE SEEN)  



ECG Data
Attestation: I personally reviewed and interpreted this ECG as follows: (Sinus rhythm at 97 bpm, right axis deviation, nonspecific ST changes, no acute ST segment elevation or T-wave inversion)

Discharge Plan
Discharge
Chief Complaint: Weakness

Clinical Impression:
 Diabetic foot ulcer, General weakness, Falls frequently


Patient Disposition: Admitted as Observation

Time of Disposition Decision: 00:16

Condition: Fair

## 2023-09-26 NOTE — CT_ITS
[address] [address] 
     [phone] 
  
  
Patient Name: 
NICK BURTON 
  
MRN: [id number]    YOB: 1965    Sex: M 
Assigned Patient Location: ER 
Current Patient Location: ED.MAIN 
Accession/Order Number: [id number] 
Exam Date: 9/26/2023  20:50    Report Date: 9/26/2023  22:24 
  
At the request of: 
GARRETT  MARKER   
  
Procedure:  CT cervical spine wo con 
  
CT CERVICAL SPINE WITHOUT CONTRAST 
  
HISTORY: fall, head injury on lovenox. 
  
COMPARISON: None available. 
  
TECHNIQUE: Helical CT images were performed of the cervical spine without  
intravenous contrast. Dose reduction techniques were achieved by using  
automated exposure control and/or adjustment of mA and/or kV according to  
patient size and/or use of iterative reconstruction technique. 
  
FINDINGS:  
  
 RADIOGRAPH: Unremarkable. 
  
MINERALIZATION: Moderate osteopenia. 
  
CRANIOCERVICAL AND ATLANTOAXIAL ARTICULATIONS: Intact. 
  
VERTEBRAL BODIES: Normal in height with no acute compression fracture. 
  
DISC SPACES: Mild narrowing at C5-C6 and moderate narrowing at C6-C7 with  
osteophytic spurring consistent with degenerative changes. 
  
ALIGNMENT: Grade 1 anterolisthesis is at C3-C4, likely degenerative in nature. 
  
POSTERIOR ELEMENTS: Intact. 
  
ODONTOID PROCESS: Intact. 
  
VISUALIZED SKULL BASE: Unremarkable. 
  
SPINAL CANAL/NEURAL FORAMEN:  
  
C2-C3: Small central disc protrusion with mild canal stenosis and severe right  
  
neural foraminal stenosis due to facet and uncovertebral hypertrophy. 
  
C3-C4: Small central disc protrusion, mild canal stenosis, moderate left and  
severe right neural foraminal stenosis. 
  
C4-C5: Moderate left posterior paracentral disc protrusion with mild canal  
stenosis, moderate left subarticular recess stenosis and moderate to severe  
bilateral neural foraminal stenosis. 
  
C5-C6: Posterior disc osteophyte complex mild canal stenosis and moderate left  
  
and severe right neural foraminal stenosis. 
  
C6-C7: Posterior disc osteophyte complex with mild canal stenosis, severe  
bilateral foraminal stenosis 
  
C7-T1: Posterior disc osteophyte complex, mild canal stenosis, no significant  
neural foraminal stenosis. 
  
UPPER THORAX: Unremarkable. 
  
SOFT TISSUES OF THE NECK: Unremarkable. 
  
ORDER #: [id number] CT/CT cervical spine wo con  
IMPRESSION:   
   
1. No acute fracture or subluxation.  
   
2. Degenerative changes as described in the body of the report.  
   
3. Osteopenia.  
   
   
Electronically authenticated by: BARON WILSON   Date: 9/26/2023  22:24

## 2023-09-26 NOTE — CT_ITS
The [address] [address] 
     [phone] 
  
  
Patient Name: 
NICK BURTON 
  
MRN: [id number]    YOB: 1965    Sex: M 
Assigned Patient Location: ER 
Current Patient Location: ED.MAIN 
Accession/Order Number: [id number] 
Exam Date: 9/26/2023  20:50    Report Date: 9/26/2023  22:23 
  
At the request of: 
GARRETT  MARKER   
  
Procedure:  CT head/brain wo con 
  
EXAMINATION: CT head/brain wo con, 9/26/2023 8:50 PM EDT 
  
HISTORY: fall, head trauma 
  
COMPARISON: CT head 12/18/2022.  
  
TECHNIQUE: CT scan of the head was performed without IV contrast. CT dose  
reduction technique was used, including Automated Exposure Control.  
  
FINDINGS:  
  
BRAIN PARENCHYMA/CSF SPACES: Ventricles are normal in size for age. There is  
no  
hemorrhage, mass effect or midline shift. 1 cm hypodensity in the right  
frontal  
lobe consistent with a chronic lacunar infarct. This is new compared the  
previous head CT. Small chronic lacunar infarcts are also seen within the  
bilateral thalami and basal ganglia similar to the previous study. There is  
also small chronic infarct in the left insula. There is a small chronic  
lacunar  
infarct in the right posterior parietal subcortical white matter. There is  
mild  
hypodensity in the periventricular white matter consistent with chronic  
microvascular ischemia. 
  
PARANASAL SINUSES: Clear. 
  
SKULL BASE AND CALVARIUM: Small right mastoid effusion. 
  
EXTRACRANIAL SOFT TISSUES: Normal. 
  
ORDER #: [id number] CT/CT head/brain wo con  
IMPRESSION:   
   
1. No acute intracranial abnormality.  
   
2. Chronic microvascular ischemia and chronic lacunar infarcts as described   
above.  
   
   
Electronically authenticated by: BARON WILSON   Date: 9/26/2023  22:23

## 2023-09-26 NOTE — ED_ITS
HPI - Weakness    
General    
Chief complaint: Weakness    
Stated complaint: Falls    
Time Seen by Provider: 09/26/23 20:14    
Source: patient    
Mode of arrival: ambulance    
Limitations: no limitations    
History of Present Illness    
HPI Narrative:     
58-year-old male comes diabetic who recently was transferred from this facility   
to [facility] on August 10 for evaluation of possible mesenteric ischemia   
and underwent abdominal surgery and spent 2 weeks in the hospital and was   
recently discharged home is brought to the emergency department by EMS from home  
after he had 3 falls today. The patient states his legs are just giving out from  
under him. He fell into mulch at his house and is covered with dirt from the   
mulch and fell onto his knee and skinned his knee. He also fell once and struck   
his face on the ground. He denies any loss of consciousness. He has no neck   
pain. He does complain of low back pain. He has no focal weakness numbness or   
tingling but states he just feels weak. He is being evaluated by podiatry in the  
future for ulcers on  her aspect of both feet, right greater than left. He has   
dry wraps on the feet at this time. There has been some drainage on the   
dressings according to him but he has not noticed any foul odor . He states he   
is very thirsty. He denies any chest pain or shortness of breath. He does have a  
history of reflux and has ongoing epigastric abdominal pain at times. States he   
lives by himself.  He was helping a friend to get gasoline for his car after he   
ran out of gas and also went to the grocery store with his daughter today before  
becoming extremely weak and having two of his falls. He states that the last   
time he fell he will was unable to get himself up so called EMS. He is currently  
giving himself Lovenox injections. He denies a history of any kidney disease.    
    
Related Data    
                                Home Medications    
    
    
    
 Medication  Instructions  Recorded  Confirmed    
     
amlodipine 10 mg tablet 10 mg PO DAILY 09/26/23 09/27/23    
     
atorvastatin 40 mg tablet 40 mg PO DAILY 09/26/23 09/27/23    
     
buspirone 10 mg tablet 10 mg PO BID 09/26/23 09/27/23    
     
enoxaparin 100 mg/mL subcutaneous 100 mg subcut Q12H 09/26/23 09/27/23    
    
syringe       
     
gabapentin 100 mg capsule 100 mg PO Q8H 09/26/23 09/27/23    
     
losartan 25 mg tablet 25 mg PO DAILY 09/26/23 09/27/23    
     
buspirone 30 mg tablet 30 mg PO BID 09/27/23 09/27/23    
     
insulin glargine 100 unit/mL (3 unit subcut 09/27/23     
    
mL) subcutaneous pen (Lantus       
    
Solostar U-100 Insulin)       
     
insulin lispro 100 unit/mL subcut 09/27/23     
    
subcutaneous pen       
     
mirtazapine 30 mg tablet 30 mg PO DAILY 09/27/23 09/27/23    
     
omeprazole 40 mg capsule,delayed 40 mg PO DAILY 09/27/23 09/27/23    
    
release       
     
potassium chloride 20 mEq 20 meq PO DAILY 09/27/23 09/27/23    
    
tablet,extended release(part/cryst)       
     
venlafaxine 150 mg 150 mg PO BID 09/27/23 09/27/23    
    
capsule,extended release 24 hr       
    
    
    
                                    Allergies    
    
    
    
Allergy/AdvReac Type Severity Reaction Status Date / Time    
     
No Known Drug Allergies Allergy   Verified 09/26/23 20:14    
    
    
    
    
Review of Systems    
    
    
ROS      
    
 Status of ROS 10 or more systems reviewed and unremarkable except as noted in   
history and below       
    
    
[facility]    
Medical History (Updated 09/27/23 @ 01:19 by Paty Patel RN)    
    
    
    
Surgical History (Updated 09/27/23 @ 01:20 by Paty Patel, JOSE ARMANDO)    
    
    
    
Family History (Updated 09/27/23 @ 01:22 by Paty Patel, RN)    
Brother   Family history of stroke    
Other   Family history of hypertension    
    
    
    
Social History (Updated 09/27/23 @ 01:25 by Paty Patel, RN)    
Within the past year, how often did you have a drink containing alcohol:  never     
Score interpretation:  A score less than 4 is consistent with normal alcohol   
consumption.     
Smoking status:  Never smoker     
Non-prescribed substance use:  former substance user     
Non-prescribed substance use details:  patient states drug use  back in the 80s   
    
Previous occupational history:  on disability     
Highest level of school completed/degree received:  Associate degree: academic   
program     
In a typical week, how many times do you talk on the telephone with family,   
friends, or neighbors:  once per week     
How often do you get together with friends or relatives:  once per week     
Little interest or pleasure in doing things:  several days     
Feeling down, depressed, or hopeless:  several days     
Feel stressed/tense/nervous/anxious/difficulty sleeping:  to some extent     
Do you think of yourself as:  straight/heterosexual     
Gender Identity:  male     
    
    
    
Exam    
Narrative    
Exam Narrative:     
Nurses note and vital signs reviewed and patient is not hypoxic. Rectal   
temperature was performed and he is afebrile    
    
General: Nontoxic, poorly kempt adult male, GCS 15, no respiratory distress    
Skin: Skin is pale, dry with scattered areas of ecchymosis    
Head:  Normocephalic, atraumatic, Abrasion over bridge of nose    
Eye: Normal conjunctiva, no drainage, EOMI. PERRL    
Ears, Nose, Mouth, and Throat: oral mucosa is dry. Nares patent. Mouth without   
vesicles.     
Cardiovascular:  Regular Rate and Rhythm S1 and S2, pulses are brisk and equal   
bilaterally    
Respiratory:  Patient is in no distress, no accessory muscle use, lungs are   
clear to auscultation, no wheezing, rales or rhonchi    
Back:  non-tender, no CVA tenderness bilaterally to percussion.    
GI:  Normal bowel sounds, Midline abdominal incision, no hernia appreciated,   
nontender, no sign of abdominal wall cellulitis    
Musculoskeletal: The patient has no evidence of calf tenderness, No pitting   
edema noted, there is a large black eschar on the base of the right foot is   
approximately 3 x 4 cm, it is dry, there is no drainage noted, there are 2   
approximately 1 cm eschars on the base of the left foot again without drainage   
or local cellulitis, lower extremity pulses are brisk and equal bilaterally,   
feet are warm and sensate    
Neurological:  A&O x4, normal speech, No focal deficits, patient easily pulls   
himself to a sitting position, lower extremity push and pull his intact,    
strength is intact, speech is clear, there is no facial droop.    
Psychiatric:  Cooperative    
Constitutional    
Vital Signs, click to edit/add:     
    
                                Last Vital Signs    
    
    
    
Temp  98.0 F   09/27/23 01:12    
     
Pulse  87   09/27/23 01:12    
     
Resp  18   09/27/23 01:12    
     
BP  122/73   09/27/23 01:12    
     
Pulse Ox  94 L  09/27/23 01:12    
     
O2 Del Method  Room Air  09/27/23 01:12    
    
    
    
    
    
Course    
Vital Signs    
Vital signs:     
    
                                   Vital Signs    
    
    
    
Temperature  97.9 F   09/26/23 20:11    
     
Pulse Rate  97 H  09/26/23 20:11    
     
Respiratory Rate  16   09/26/23 20:11    
     
Blood Pressure  115/70   09/26/23 20:11    
     
Pulse Oximetry  97   09/26/23 20:11    
     
Oxygen Delivery Method  Room Air  09/26/23 20:11    
    
    
                                            
    
    
    
Temperature  98.0 F   09/27/23 01:12    
     
Pulse Rate  87   09/27/23 01:12    
     
Respiratory Rate  18   09/27/23 01:12    
     
Blood Pressure  122/73   09/27/23 01:12    
     
Pulse Oximetry  94 L  09/27/23 01:12    
     
Oxygen Delivery Method  Room Air  09/27/23 01:12    
    
    
    
    
    
MDM - Weakness    
MDM Narrative    
Medical decision making narrative:     
58-year-old male with a history of diabetes who recently underwent abdominal   
surgery at [facility] after he was seen in this emergency department and   
thought to possibly have mesenteric ischemia with circumferential wall   
thickening of the proximal small bowel loops with surrounding fat stranding and   
vasa recta engorgement with possible transition point in the left lower abdomen   
and collapse of distal bowel loops with an internal hernia and vascular   
narrowing resulting in bowel ischemia of venous type is brought to the emergency  
department by EMS from home. The patient was hospitalized for approximately 2   
weeks at [facility] and discharged home recently. He lives alone. He has   
been following up with his family physician and seeing her for wound changes on   
both of his feet. He states that he got ulcers on his feet while he was in an   
 induced coma  after his surgery. He does have foot ulcers but they are not on   
pressure areas of his feet. He has a large black eschar on the plantar aspect of  
the right foot that is not draining or foul-smelling and 2 smaller black eschars  
on the base of the left foot that are also not draining. There is minimal local   
erythema or cellulitic changes. There was nothing to culture. The patient states  
that he had a busy day today, he went grocery shopping with his daughter and   
then his friend of his ran out of gas and he was helping him get gas back in his  
car and carrying a gas tank. He had a fall earlier in the day and was able to   
get himself back up and carry on with his day but  later in the day he fell once  
in the mulch and then once again outside and was unable to get up due to   
generalized weakness. He denies any injury related to his fall but did fall in   
the mulch and scraped up his nose. The patient has no focal neurologic deficits   
and does not exhibit much generalized weakness. He has no truncal weakness, no   
focal neurologic deficits. He has no headache or neck pain. He does complain of   
chronic back pain and thinks that his legs are giving out due to his back   
issues.    
EKG done upon arrival was a sinus rhythm at 97 bpm  with no acute changes. An IV  
was placed and he was given gentle IV hydration and a septic workup was ordered   
1) due to his recent hospitalization and abdominal surgery and 2) due to the   
diabetic foot ulcers on both feet and weakness. He has a normal white count   
today, hemoglobin is lower than it was prior to his surgery at 9.8. 2 sets of   
blood cultures are pending. He has a normal lactic acid. Electrolytes are normal  
with the exception of a glucose of 163. His alkaline phosphatase is mildly   
elevated but the remainder of his LFTs are normal. Urine does not show any sign   
of infection. His abdominal wall incision appears to be healing well and he does  
not complain of any abdominal pain. CT scan of the brain and cervical spine is   
ordered due to the generalized weakness and history of fall with head injury.   
These were both normal. Chest x-ray was negative for acute findings. X-rays of   
both feet did not show any concerning evidence of osteomyelitis. The patient was  
given 3 g of Unasyn due to the diabetic foot ulcers. He will likely require   
admission and podiatric consultation for the foot ulcers. He has remained awake   
alert and neurologically stable in the emergency department without any   
complaints. He did at one point requests something for his low back pain and was  
given a Norco and Zofran. The case was discussed with the hospitalist the   
patient is accepted for admission.    
Lab Data    
Labs:     
    
                                   Lab Results    
    
    
    
  09/26/23 09/26/23 Range/Units    
    
  20:49 21:40     
     
WBC  7.0   (4.0-11.0)  10^3/uL    
     
RBC  3.46 L   (4.70-6.10)  10^6/uL    
     
Hgb  9.8 L   (14.0-18.0)  g/dL    
     
Hct  32.4 L   (42.0-54.0)  %    
     
MCV  93.6   (80.0-94.0)  fL    
     
MCH  28.3   (25.9-34.0)  pg    
     
MCHC  30.2   (29.9-35.2)  g/dL    
     
RDW  15.7 H   (11.0-15.0)  %    
     
Plt Count  181   (150-450)  10^3/uL    
     
MPV  10.0   (9.5-13.5)  fL    
     
Neut % (Auto)  70.0   (43.0-75.0)  %    
     
Lymph % (Auto)  22.4   (20.5-60.0)  %    
     
Mono % (Auto)  5.4   (1.7-12.0)  %    
     
Eos % (Auto)  1.4   (0.9-7.0)  %    
     
Baso % (Auto)  0.4   (0.2-2.0)  %    
     
Neut # (Auto)  4.9   (1.4-6.5)  10^3/uL    
     
Lymph # (Auto)  1.6   (1.2-3.8)  10^3/uL    
     
Mono # (Auto)  0.4   (0.3-0.8)  10^3/uL    
     
Eos # (Auto)  0.1   (0.0-0.7)  10^3/uL    
     
Baso # (Auto)  0.0   (0.0-0.1)  10^3/uL    
     
Abs Immat Gran (auto)  0.03   (0.00-0.03)  10^3/uL    
     
Imm/Tot Granulo (auto)  0.4   (0.0-0.5)  %    
     
Sodium  141   (136-145)  mmol/L    
     
Potassium  3.5   (3.5-5.1)  mmol/L    
     
Chloride  108 H   ()  mmol/L    
     
Carbon Dioxide  26.0   (21.0-32.0)  mmol/L    
     
Anion Gap  10.5       
     
BUN  8.0   (7.0-18.0)  mg/dL    
     
Creatinine  0.88   (0.70-1.30)  mg/dL    
     
Est GFR ( Amer)  >60   (>=60)      
     
Est GFR (Non-Af Amer)  >60   (>=60)      
     
BUN/Creatinine Ratio  9.1       
     
Glucose  163 H   ()  mg/dL    
     
Lactate  1.8   (0.4-2.0)  mmol/L    
     
Calcium  7.4 L   (8.5-10.1)  mg/dL    
     
Total Bilirubin  0.2   (0.2-1.0)  mg/dL    
     
AST  24   (15-37)  U/L    
     
ALT  32   (16-63)  U/L    
     
Alkaline Phosphatase  127 H   ()  U/L    
     
Troponin I High Sens  5.3   (4.0-76.1)  pg/mL    
     
Total Protein  6.0 L   (6.4-8.2)  g/dL    
     
Albumin  2.4 L   (3.4-5.0)  g/dL    
     
Globulin  3.6   g/dL    
     
Albumin/Globulin Ratio  0.7       
     
Urine Color   Lt. yellow  (YELLOW)      
     
Urine Clarity   Clear  (CLEAR)      
     
Urine pH   5.5  (5.0-9.0)      
     
Ur Specific Gravity   <=1.005 A  (1.005-1.025)      
     
Urine Protein   Negative  (NEG/TRACE)  mg/dL    
     
Urine Glucose (UA)   Negative  (NEGATIVE)  mg/dL    
     
Urine Ketones   Negative  (NEGATIVE)  mg/dL    
     
Urine Occult Blood   Negative  (NEGATIVE)      
     
Urine Nitrite   Negative  (NEGATIVE)      
     
Urine Bilirubin   Negative  (NEGATIVE)      
     
Urine Urobilinogen   0.2  (0.2-1.0)  EU/dL    
     
Ur Leukocyte Esterase   Trace A  (NEGATIVE)      
     
Urine RBC   0-2  (0-2)  #/HPF    
     
Urine WBC   0-2 A  (NONE SEEN)  #/HPF    
     
Ur Squamous Epith Cells   None seen  (NONE/RARE)  #/LPF    
     
Urine Crystals   None seen  (None Seen)  #/HPF    
     
Urine Bacteria   None seen  (NONE SEEN)  #/HPF    
     
Urine Casts   None seen  (NONE SEEN)  #/LPF    
     
Urine Mucus   None seen  (NONE SEEN)      
    
    
    
    
ECG Data    
Attestation: I personally reviewed and interpreted this ECG as follows: (Sinus   
rhythm at 97 bpm, right axis deviation, nonspecific ST changes, no acute ST   
segment elevation or T-wave inversion)    
    
Discharge Plan    
Discharge    
Chief Complaint: Weakness    
    
Clinical Impression:    
 Diabetic foot ulcer, General weakness, Falls frequently    
    
    
Patient Disposition: Admitted as Observation    
    
Time of Disposition Decision: 00:16    
    
Condition: Fair

## 2023-09-27 VITALS
HEART RATE: 89 BPM | SYSTOLIC BLOOD PRESSURE: 108 MMHG | DIASTOLIC BLOOD PRESSURE: 72 MMHG | OXYGEN SATURATION: 95 % | RESPIRATION RATE: 17 BRPM

## 2023-09-27 VITALS
RESPIRATION RATE: 20 BRPM | HEART RATE: 90 BPM | TEMPERATURE: 97.88 F | DIASTOLIC BLOOD PRESSURE: 77 MMHG | SYSTOLIC BLOOD PRESSURE: 130 MMHG | OXYGEN SATURATION: 92 %

## 2023-09-27 VITALS — HEART RATE: 94 BPM | DIASTOLIC BLOOD PRESSURE: 73 MMHG | SYSTOLIC BLOOD PRESSURE: 118 MMHG

## 2023-09-27 VITALS — SYSTOLIC BLOOD PRESSURE: 125 MMHG | HEART RATE: 96 BPM | DIASTOLIC BLOOD PRESSURE: 70 MMHG

## 2023-09-27 VITALS — RESPIRATION RATE: 14 BRPM | HEART RATE: 88 BPM | OXYGEN SATURATION: 95 %

## 2023-09-27 VITALS
RESPIRATION RATE: 19 BRPM | OXYGEN SATURATION: 99 % | SYSTOLIC BLOOD PRESSURE: 121 MMHG | DIASTOLIC BLOOD PRESSURE: 83 MMHG | HEART RATE: 100 BPM

## 2023-09-27 VITALS
DIASTOLIC BLOOD PRESSURE: 73 MMHG | OXYGEN SATURATION: 94 % | SYSTOLIC BLOOD PRESSURE: 122 MMHG | HEART RATE: 87 BPM | RESPIRATION RATE: 18 BRPM | TEMPERATURE: 98 F

## 2023-09-27 VITALS
RESPIRATION RATE: 18 BRPM | TEMPERATURE: 98.06 F | HEART RATE: 78 BPM | SYSTOLIC BLOOD PRESSURE: 124 MMHG | DIASTOLIC BLOOD PRESSURE: 68 MMHG | OXYGEN SATURATION: 95 %

## 2023-09-27 VITALS
OXYGEN SATURATION: 95 % | RESPIRATION RATE: 16 BRPM | HEART RATE: 84 BPM | SYSTOLIC BLOOD PRESSURE: 109 MMHG | TEMPERATURE: 97.7 F | DIASTOLIC BLOOD PRESSURE: 73 MMHG

## 2023-09-27 VITALS
HEART RATE: 88 BPM | TEMPERATURE: 98 F | OXYGEN SATURATION: 97 % | RESPIRATION RATE: 18 BRPM | DIASTOLIC BLOOD PRESSURE: 73 MMHG | SYSTOLIC BLOOD PRESSURE: 122 MMHG

## 2023-09-27 VITALS — HEART RATE: 98 BPM | RESPIRATION RATE: 15 BRPM | OXYGEN SATURATION: 96 %

## 2023-09-27 LAB
ADD MANUAL DIFF: NO
ANION GAP: 8
BLOOD UREA NITROGEN: 6 MG/DL (ref 7–18)
CALCIUM: 7.3 MG/DL (ref 8.5–10.1)
CARBON DIOXIDE: 28.5 MMOL/L (ref 21–32)
CHLORIDE: 112 MMOL/L (ref 98–107)
CO2 BLD-SCNC: 28.5 MMOL/L (ref 21–32)
ESTIMATED GFR (AFRICAN AMERICA: >60 (ref 60–?)
ESTIMATED GFR (NON-AFRICAN AME: >60 (ref 60–?)
GLUCOSE BLD-MCNC: 225 MG/DL (ref 74–106)
HCT VFR BLD CALC: 30.2 % (ref 42–54)
HEMATOCRIT: 30.2 % (ref 42–54)
HEMOGLOBIN: 9.1 G/DL (ref 14–18)
IMMATURE GRANULOCYTES ABS AUTO: 0.02 10^3/UL (ref 0–0.03)
IMMATURE GRANULOCYTES PCT AUTO: 0.4 % (ref 0–0.5)
LYMPHOCYTES  ABSOLUTE AUTO: 2.1 10^3/UL (ref 1.2–3.8)
MCV RBC: 93.5 FL (ref 80–94)
MEAN CORPUSCULAR HEMOGLOBIN: 28.2 PG (ref 25.9–34)
MEAN CORPUSCULAR HGB CONC: 30.1 G/DL (ref 29.9–35.2)
MEAN CORPUSCULAR VOLUME: 93.5 FL (ref 80–94)
PLATELET # BLD: 155 10^3/UL (ref 150–450)
PLATELET COUNT: 155 10^3/UL (ref 150–450)
POTASSIUM SERPLBLD-SCNC: 3.5 MMOL/L (ref 3.5–5.1)
POTASSIUM: 3.5 MMOL/L (ref 3.5–5.1)
RED BLOOD COUNT: 3.23 10^6/UL (ref 4.7–6.1)
SODIUM BLD-SCNC: 145 MMOL/L (ref 136–145)
SODIUM: 145 MMOL/L (ref 136–145)
THYROID STIMULATING HORMONE: 0.78 UIU/ML (ref 0.36–3.74)
WBC # BLD: 5.3 10^3/UL (ref 4–11)
WHITE BLOOD COUNT: 5.3 10^3/UL (ref 4–11)

## 2023-09-27 RX ADMIN — BUSPIRONE HYDROCHLORIDE 30 MG: 15 TABLET ORAL at 09:10

## 2023-09-27 RX ADMIN — ONDANSETRON 4 MG: 4 TABLET, ORALLY DISINTEGRATING ORAL at 00:28

## 2023-09-27 RX ADMIN — AMLODIPINE BESYLATE 10 MG: 5 TABLET ORAL at 09:11

## 2023-09-27 RX ADMIN — VENLAFAXINE HYDROCHLORIDE 300 MG: 150 CAPSULE, EXTENDED RELEASE ORAL at 09:11

## 2023-09-27 RX ADMIN — POTASSIUM CHLORIDE 20 MEQ: 750 TABLET, EXTENDED RELEASE ORAL at 09:11

## 2023-09-27 RX ADMIN — COLLAGENASE SANTYL 1 APPLIC: 250 OINTMENT TOPICAL at 13:21

## 2023-09-27 RX ADMIN — GABAPENTIN 100 MG: 100 CAPSULE ORAL at 05:54

## 2023-09-27 RX ADMIN — GABAPENTIN 300 MG: 300 CAPSULE ORAL at 21:11

## 2023-09-27 RX ADMIN — BUSPIRONE HYDROCHLORIDE 30 MG: 15 TABLET ORAL at 20:33

## 2023-09-27 RX ADMIN — INSULIN ASPART 0 UNIT: 100 INJECTION, SOLUTION INTRAVENOUS; SUBCUTANEOUS at 21:11

## 2023-09-27 RX ADMIN — AMPICILLIN SODIUM AND SULBACTAM SODIUM 200 GM: 2; 1 INJECTION, POWDER, FOR SOLUTION INTRAMUSCULAR; INTRAVENOUS at 10:16

## 2023-09-27 RX ADMIN — ENOXAPARIN SODIUM 100 MG: 100 INJECTION SUBCUTANEOUS at 09:10

## 2023-09-27 RX ADMIN — OMEPRAZOLE 40 MG: 40 CAPSULE, DELAYED RELEASE ORAL at 09:11

## 2023-09-27 RX ADMIN — MIRTAZAPINE 30 MG: 15 TABLET, FILM COATED ORAL at 21:11

## 2023-09-27 RX ADMIN — GABAPENTIN 300 MG: 300 CAPSULE ORAL at 13:21

## 2023-09-27 RX ADMIN — ENOXAPARIN SODIUM 100 MG: 100 INJECTION SUBCUTANEOUS at 20:32

## 2023-09-27 RX ADMIN — INSULIN ASPART 0 UNIT: 100 INJECTION, SOLUTION INTRAVENOUS; SUBCUTANEOUS at 10:58

## 2023-09-27 RX ADMIN — AMPICILLIN SODIUM AND SULBACTAM SODIUM 200 GM: 2; 1 INJECTION, POWDER, FOR SOLUTION INTRAMUSCULAR; INTRAVENOUS at 16:23

## 2023-09-27 RX ADMIN — LOSARTAN POTASSIUM 25 MG: 25 TABLET, FILM COATED ORAL at 09:10

## 2023-09-27 RX ADMIN — INSULIN ASPART 0 UNIT: 100 INJECTION, SOLUTION INTRAVENOUS; SUBCUTANEOUS at 16:06

## 2023-09-27 RX ADMIN — TRAMADOL HYDROCHLORIDE 50 MG: 50 TABLET, COATED ORAL at 10:58

## 2023-09-27 RX ADMIN — ATORVASTATIN CALCIUM 40 MG: 40 TABLET, FILM COATED ORAL at 09:11

## 2023-09-27 NOTE — SWNOTE1
SW spoke with pt in room. SW updated pt that he would have to call medical service company on order for them to send new bipap. Pt voiced he did call them earlier and spoke to them and they will be sending new bipap. Pt asked about wearing a bipap 
while here at hospital. SW to check with doctor. Pt does live at home by himself. He voiced his daughter lives in [place] and she comes over every other weekend and does check in on him. Pt voiced the doctor is trying to figure out his back pain. He 
did work with therapy and he will likely want to do outpt therapy at discharge. 

SW reached out to doctor and she will put order in for bipap. SW called [employer] to get settings. SW called respiratory with bipap settings.

## 2023-09-27 NOTE — W.PM.TELEPN
Progress Note: Subjective
Subjective
Interval history: 
CC: Recurrent falls

HPI: This is a very pleasant 58 years old male who presents with above complaints.  Patient's past medical history significant for diabetes, dyslipidemia, recent surgery for mesenteric ischemia.  Patient has been recuperating at home.  Patient 
stating that he tried to help his neighbor/relative with some chores and probably overexerted himself.  He felt that his legs were very weak and buckled under him.  After the first fall he was able to stand up by himself.  He needed help after the 
second fall.  After the third fall he was not able to stand up anymore.  Patient endorses chronic low back pain with bilateral radiculopathy and spinal stenosis.  Evaluation in the emergency room has been unremarkable besides finding of black eschar 
covered wounds on both soles of his feet.

Exam
Narrative
Exam Narrative: 
ROS:
1.General: no fever, chills, not in distress
 2.HEENT: no HA, no blurry vision, no swallow problems, no nasal congestion, no sore throat
 3.Pulmonary: no cough, SOB, wheezes
 4.CVS: no CP, no palpitations, no FISCHER, no SOB, no intermittent claudication
 5.GI: no nausea, vomiting or diarrhea, no abdominal pain, no constipation, no hematemesis or hematochezia
 6.: no renal colic, no hematuria, urinary frequency or urgency
 7.Extremities: no edema
 8.Neurological: no dizziness, vertigo, double or blurry vision, no no focal weakness, no paresthesia, no swallow or speech problems
 9.Musculosceletal: no joint pains, no joint swelling, no back pain
 10.Dermatological: no skin rashes, no lesions, no pruritus
 11.Hematological: no bleeding, no hx/o clots
 12.Endocrinological: no heat/cold intolerance, no hx/o diabetes
 13.Psychiatric: no suicidal or homicidal thoughts


 Physical Exam:
Not in distress, pleasant, lucid, cooperative,
 Head - atraumatic, eyes - pupils equal, round, reactive to light, extra ocular movement intact, MMM
 Neck - supple, thyroid not enlarged, LN not palpated
 Lungs - clear to auscultation, no dullness on percussion
 CVS - heart sounds S1, S2, no additional murmurs gallop, regular rate and rhythm
 Gastrointestinal?abdomen is soft, non-tender, non-distended, no organomegaly, positive bowel sounds
 Extremities no clubbing, cyanosis or edema
 Neurological?cranial nerve II?XII grossly intact, no meningeal signs, no cerebellar signs, no sensory deficit
 Musculoskeletal -  joints, no effusions, ROM preserved
 Dermatological - the skin dry, warm, good color covered wounds on the soles of both of his feet
 Psychiatric?patient is AAO X3, patient has normal affect
Constitutional
Vital Signs, click to edit/add: 
Last Vital Signs

Temp  98.0 F   09/27/23 01:12
Pulse  87   09/27/23 02:25
Resp  18   09/27/23 01:12
BP  109/68   09/27/23 02:25
Pulse Ox  94 L  09/27/23 01:12
O2 Del Method  Room Air  09/27/23 01:12



Progress Note: Objective
Labs
Labs: 
Short CBC

  09/26/23 Range/Units
  20:49 
WBC  7.0  (4.0-11.0)  10^3/uL
Hgb  9.8 L  (14.0-18.0)  g/dL
Hct  32.4 L  (42.0-54.0)  %
Plt Count  181  (150-450)  10^3/uL

BMP

  09/26/23
  20:49
Sodium  141
Potassium  3.5
Chloride  108 H
Carbon Dioxide  26.0
BUN  8.0
Creatinine  0.88
Glucose  163 H
Calcium  7.4 L

Liver Function

  09/26/23 Range/Units
  20:49 
Total Bilirubin  0.2  (0.2-1.0)  mg/dL
AST  24  (15-37)  U/L
ALT  32  (16-63)  U/L
Alkaline Phosphatase  127 H  ()  U/L
Albumin  2.4 L  (3.4-5.0)  g/dL

Urine

  09/26/23 Range/Units
  21:40 
Urine Color  Lt. yellow  (YELLOW)  
Urine Clarity  Clear  (CLEAR)  
Urine pH  5.5  (5.0-9.0)  
Ur Specific Gravity  <=1.005 A  (1.005-1.025)  
Urine Protein  Negative  (NEG/TRACE)  mg/dL
Urine Glucose (UA)  Negative  (NEGATIVE)  mg/dL



Progress Note: A&P
Assessment and Plan
(1) General weakness: 
      Assessment and Plan: 
Multifactorial
(2) Falls frequently: 
      Assessment and Plan: 
Fall precautions in place.  Suspect related to lower extremity weakness due to spinal stenosis which flared up after recent exertion.  Follow-up with physical clinic patient therapy.  I ordered additional work-up including orthostatic vital signs, 
carotid ultrasound and echocardiogram to rule out possibility of additional causes for falls.  I also suspect patient has diabetic neuropathy which also contributed to unstable gait
(3) Diabetic foot ulcer: 
      Assessment and Plan: 
No signs of sepsis.  Dr. Richards consulted.  Continue with empiric antibiotics
(4) Diabetes: 
      Assessment and Plan: 
Continue with ADA diet.  Coverage with insulin sliding scale
(5) Hypertension: 
      Assessment and Plan: 
Resume home regimen.  Adjust as needed
(6) Spinal stenosis: 
      Assessment and Plan: 
As above.  Patient might need additional imaging

Plan
END:
As the provider for the telehealth service, I attest that I introduced myself to the patient, provided my credentials, disclosed by location and determined that based on a review of the patient's chart and discussion with members of the patient's 
treatment team, telemedicine via real-time, 2 way, and interactive audio and video platform is an appropriate and effective means of providing the service. ?The patient and I mutually agree this visit is appropriate for telemedicine. ?[place] ?The encounter took approximately 35 minutes. ?The nurse was present during the entire time and I was able to move the stethoscope in appropriate directions. ?The patient was evaluated at the Hospital
?
Portions of this note may be dictated using Dragon voice recognition software. Variances in spelling and vocabulary are possible and unintentional. Not all errors may be caught and/or corrected. Please notify the author if any discrepancies are 
noted and/or if the meaning of any statement is unclear.?
?
Patient verbally consented for treatment via video visit with patient currently located at the [facility] and provider located in [place].

Telemedicine Attestation
Telemedicine Attestation
I conducted this encounter from [place] via secure live, face-to-face video conference with the patient, located at [facility] with [frequent falls].
Prior to the interview, the risks and benefits of telemedicine were discussed with the patient and verbal consent was obtained.

## 2023-09-27 NOTE — CA_ITS
Patient:     NICK BURTON     Exam Date:     2023 
:     1965          Gender:M     MRN:     [id number] 
Ordering :     Gopi CONLEY Sister     Admission #:     [id number] 
Family :     AURY FONTANEZ .     Order #:     [id number] 
     CLICK HERE TO VIEW EXAM 
  
ECHOCARDIOGRAM REPORT 
  
PROCEDURE:     CA ECHO DOPPLER COMPLETE 
  
INDICATIONS:     Syncope, weakness 
  
COMPARISON:     None. 
  
DESCRIPTION:     COMPLETE ECHOCARDIOGRAM Real-time transthoracic  
echocardiography with 2D, M-mode, spectral and color flow Doppler performed.  
  
QUALITY:     Technical quality was good.   
LEFT VENTRICLE:     Normal chamber size.  Normal left ventricular wall  
thickness. 
LV EF:     Global left ventricular systolic function is normal. Calculated  
left ventricular ejection fraction is 65% 
DIASTOLIC:     Normal diastolic function. 
ATRIAL SEPTUM:     Inadequately seen. 
LEFT ATRIUM:     Normal chamber size.  
RIGHT ATRIUM:     Normal chamber size.  
RIGHT VENTRICLE:     Normal chamber size. Normal right ventricular systolic  
function.     
TRICUSPID VALVE:     Normal mobility and thickness. No stenosis with trivial  
regurgitation. No evidence of pulmonary hypertension. RVSP 26mmHg      
MITRAL VALVE:     Normal mobility and thickness.   No evidence of mitral valve  
stenosis.  There is no mitral annular calcification. Trivial mitral  
regurgitation.      
AORTIC VALVE:     Normal trileaflet appearance. No visible sclerosis.  Normal  
leaflet mobility.  No evidence of aortic valve stenosis. No aortic  
regurgitation.      
AORTIC ROOT:     Normal diameter and appearance.           
PULMONIC VALVE:     Normal thickness and mobility. No stenosis. Trivial  
regurgitation.       
PERICARDIUM:     No evidence of pericardial effusion.        
IVC:     Collapses with inspirations. Normal size.      
      
               
  
CONCLUSION:       
1. Global left ventricular systolic function is normal; visually estimated  
ejection fraction 60 to 65% 
2. Normal diastolic function 
3. Right ventricle is normal in size and systolic function 
4. No significant valvular abnormalities 
  
  
Adult Echocardiography Procedure Report 
Left Ventricle  
LVEDD (3.7 - 5.6 cm):     4.32 cm 
LVESD (2.2 - 4.0 cm):     2.85 cm 
LVIVS thickness (0.6 - 1.2 cm):     1.06 cm 
LVPW thickness (0.5 - 1.0 cm):     0.98 cm 
e':     0.15 m/s 
E - e':     5.63 
LVOT Max Gradient:     7.01 mm[Hg] 
LVOT Area (cm2):     1.32 m/s 
Peak Velocity (LVOT):     1.32 m/s 
Mean Velocity (LVOT):     0.90 m/s 
LVOT Diameter     2.22 cm 
Left Ventricular Ejection Fraction:     64.91 % 
Left Atrium  
LA Volume Index (2D A2C):     32.27 ml/m2 
Left Atrium Systolic Dimension:     4.25 cm 
Mitral Valve  
MV E to A Ratio:     0.81 
Mitral Valve A-Wave Peak Velocity:     1.03 m/s 
Mitral Valve E-Wave Peak Velocity:     0.84 m/s 
Right Ventricle  
RV Internal Diastolic Dimension:     3.20 cm 
Aorta  
AO Root Diam:     3.01 cm 
Ascending Ao Diam:     3.08 cm 
Aortic Valve  
AoV Area (Peak Mono):     3.25 cm2, 3.25 cm2 
AoV Area (VTI):     3.35 cm2, 3.35 cm2 
Peak Velocity(Antegrade Flow):     1.57 m/s 
Peak Gradient(Antegrade Flow):     9.92 mm[Hg] 
Mean Velocity(Antegrade Flow):     1.08 m/s 
Mean Gradient(Antegrade Flow):     5.13 mm[Hg] 
Velocity Time Integral:     30.19 cm 
Tricuspid Valve  
Peak Velocity (Regurgitant Flow):     1.91 m/s, 1.98 m/s, 2.04 m/s, 2.42 m/s 
Pulmonic Valve  
Mean Gradient:     4.02 mm[Hg], 3.58 mm[Hg], 4.52 mm[Hg] 
Mean Velocity:     0.94 m/s, 0.89 m/s, 0.99 m/s 
Peak Velocity:     1.37 m/s 
Peak Gradient:     7.41 mm[Hg], 6.56 mm[Hg], 8.73 mm[Hg] 
Right Atrium  
Right Atrium Systolic Pressure:     35.25 ml, 35.25 ml  
  
  
  
  
Dictated by: Becky Chan M.D. on 2023 at 16:20      
Approved by: Becky Chan M.D. on 2023 at 16:23

## 2023-09-27 NOTE — P.HP_ITS
H&P: HPI    
History of Present Illness    
Chief complaint: Falls    
Narrative:     
patient is a 4-year-old male with past medical history as ischemic mesenteric   
colitis status post colectomy and just got discharged home from a acute rehab   
facility.patient has a past medical history of hyperlipidemia, type 2 diabetes   
insulin-dependent, hypertension, diabetic neuropathy, GERD, depression with   
anxiety who presented to the emergency department yesterday evening with   
generalized weakness. Patient states that he overexerted himself with trips to   
the grocery store and gas station and fell about three times yesterday. He   
denies any loss of consciousness or hitting his head, he reports just feeling   
very weak in both of his legs and falling into the mulch. He has a long history   
of diabetic neuropathy and also has chronic bilateral diabetic foot ulcers. He   
reports his sugars have been somewhat high over the last couple weeks. He also   
notes he has obstructive sleep apnea and wears a BiPAP but is awaiting home   
medical supplies for his BiPAP and has not used in over three weeks. He also   
complains of chronic back pain no recent trauma or prior surgeries and states   
that the majority of his pain is in the lower back right side radiating to   
behind his right knee. He denies any foot drop bowel or bladder issues such as   
incontinence. He reports his abdominal wound has been healing well, he does note  
that he was at home and has not done any physical therapy since being released   
from skilled nursing facility in [place] about a week ago. He's been taking his  
medications as prescribed and has been giving himself Lovenox injections. No   
other issues or concerns today.    
    
Review of Systems    
    
    
ROS      
    
 Narrative ROS: a complete review of systems were reviewed with patient and are   
positive as below or listed in History of Chief Complaint.    
General: no fever, chills, night sweats    
Head: no headache, trauma, visual changes, nausea or vomiting    
Skin: both feet have ulcers    
Eyes: no blurriness of vision    
Ears: no reported hearing loss, vertigo, earache, or tinnitus     
Throat: no sore throat, hoarseness, swelling of neck, or tongue pain    
Heart: no chest pain    
Lungs: no shortness of breath or cough    
GI: no diarrhea or vomiting/nausea    
Urinary: no urinary urgency, frequency or pain    
Neuro: bilateral numbness and tingling of feet    
HEM: no bleeding issues or bruising    
ENDO: no thyroid problems    
Psych: no anxiety or depression    
       
    
    
[facility]    
Medical History (Updated 09/27/23 @ 10:43 by Minnie Hoskins DO)    
    
    
    
Surgical History (Reviewed 09/27/23 @ 10:37 by Minnie Hoskins DO)    
    
    
    
Family History (Reviewed 09/27/23 @ 10:37 by Minnie Hoskins DO)    
Brother   Family history of stroke    
Other   Family history of hypertension    
    
    
    
Social History (Reviewed 09/27/23 @ 10:37 by Minnie Hoskins DO)    
Within the past year, how often did you have a drink containing alcohol:  never     
Score interpretation:  A score less than 4 is consistent with normal alcohol   
consumption.     
Smoking status:  Never smoker     
Non-prescribed substance use:  former substance user     
Non-prescribed substance use details:  patient states drug use  back in the 80s   
    
Previous occupational history:  on disability     
Highest level of school completed/degree received:  Associate degree: academic   
program     
In a typical week, how many times do you talk on the telephone with family,   
friends, or neighbors:  once per week     
How often do you get together with friends or relatives:  once per week     
Little interest or pleasure in doing things:  several days     
Feeling down, depressed, or hopeless:  several days     
Feel stressed/tense/nervous/anxious/difficulty sleeping:  to some extent     
Do you think of yourself as:  straight/heterosexual     
Gender Identity:  male     
    
    
    
Meds    
Home Medications and Allergies    
                                Home Medications    
    
    
    
 Medication  Instructions  Recorded  Confirmed  Type    
     
amlodipine 10 mg tablet 10 mg PO DAILY 09/26/23 09/27/23 History    
     
atorvastatin 40 mg tablet 40 mg PO DAILY 09/26/23 09/27/23 History    
     
buspirone 10 mg tablet 10 mg PO BID 09/26/23 09/27/23 History    
     
enoxaparin 100 mg/mL subcutaneous 100 mg subcut Q12H 09/26/23 09/27/23 History    
    
syringe        
     
gabapentin 100 mg capsule 300 mg PO Q8H 09/26/23 09/27/23 History    
     
losartan 25 mg tablet 25 mg PO DAILY 09/26/23 09/27/23 History    
     
buspirone 30 mg tablet 30 mg PO BID 09/27/23 09/27/23 History    
     
insulin glargine 100 unit/mL (3 18 unit subcut .qhs 09/27/23 09/27/23 History    
    
mL) subcutaneous pen (Lantus        
    
Solostar U-100 Insulin)        
     
insulin lispro 100 unit/mL 4 unit subcut TIDWM 09/27/23 09/27/23 History    
    
subcutaneous pen        
     
mirtazapine 30 mg tablet 30 mg PO DAILY 09/27/23 09/27/23 History    
     
omeprazole 40 mg capsule,delayed 40 mg PO DAILY 09/27/23 09/27/23 History    
    
release        
     
potassium chloride 20 mEq 20 meq PO DAILY 09/27/23 09/27/23 History    
    
tablet,extended release(part/cryst)        
     
venlafaxine 150 mg 300 mg PO QDAY 09/27/23 09/27/23 History    
    
capsule,extended release 24 hr        
    
    
    
                                    Allergies    
    
    
    
Allergy/AdvReac Type Severity Reaction Status Date / Time    
     
No Known Drug Allergies Allergy   Verified 09/26/23 20:14    
    
    
    
    
Exam    
Narrative    
Exam Narrative:     
General: Patient is alert, and oriented to person, place and time with normal   
affect, proper hygiene     
Skin: 2 black ulcers on the bottom of left forefoot about dime sized, larger 2cm  
x 2cm ulceration with necrotic center on the right forefoot, no discharge or   
drainage, no erythema    
Head: atraumatic, acephalic    
Eyes: PERRLA, no nystagmus present, conjunctiva clear, no scleral icterus    
Ears: normal gross auditory acuity     
Neck: no masses palpated, normal thyroid, no JVD or audible carotid bruits     
Heart: Normal rate and rhythm, no murmurs/rubs/gallops    
Lungs: no audible wheezes, crackles and normal breath sounds all lung fields     
Abdomen: Normal audible bowel sounds, no distension, No palpable masses, no   
organomegaly, no rebound/guarding/ or rigidity, abdominal incision healing, no   
discharge or drainage    
Musculoskeletal: ROM is limited due to being in hospital bed, no swelling   
bilateral lower extremities but 5/5 strength bilateral lower extremities     
Vascular: Normal carotid, radial, femoral, posterior tibial, and dorsalis pedis   
pulses    
Lymph: no supraclavicular, axillary, or anterior/posterior cervical adenopathy     
Neuro: CN II-X grossly intact, normal sensation upper and lower extremities    
Constitutional    
Vital Signs, click to edit/add:     
                                Last Vital Signs    
    
    
    
Temp  98.1 F   09/27/23 05:51    
     
Pulse  87   09/27/23 07:42    
     
Resp  18   09/27/23 05:51    
     
BP  125/77   09/27/23 07:42    
     
Pulse Ox  95   09/27/23 05:51    
     
O2 Del Method  Room Air  09/27/23 05:51    
    
    
    
    
Results    
Labs    
Labs:     
                                    Short CBC    
    
    
    
  09/26/23 09/27/23 Range/Units    
    
  20:49 04:56     
     
WBC  7.0  5.3  (4.0-11.0)  10^3/uL    
     
Hgb  9.8 L  9.1 L  (14.0-18.0)  g/dL    
     
Hct  32.4 L  30.2 L  (42.0-54.0)  %    
     
Plt Count  181  155  (150-450)  10^3/uL    
    
    
                                       BMP    
    
    
    
  09/26/23 09/27/23    
    
  20:49 04:56    
     
Sodium  141  145    
     
Potassium  3.5  3.5    
     
Chloride  108 H  112 H    
     
Carbon Dioxide  26.0  28.5    
     
BUN  8.0  6.0 L    
     
Creatinine  0.88  0.71    
     
Glucose  163 H  225 H    
     
Calcium  7.4 L  7.3 L    
    
    
                                 Liver Function    
    
    
    
  09/26/23 Range/Units    
    
  20:49     
     
Total Bilirubin  0.2  (0.2-1.0)  mg/dL    
     
AST  24  (15-37)  U/L    
     
ALT  32  (16-63)  U/L    
     
Alkaline Phosphatase  127 H  ()  U/L    
     
Albumin  2.4 L  (3.4-5.0)  g/dL    
    
    
                                      Urine    
    
    
    
  09/26/23 Range/Units    
    
  21:40     
     
Urine Color  Lt. yellow  (YELLOW)      
     
Urine Clarity  Clear  (CLEAR)      
     
Urine pH  5.5  (5.0-9.0)      
     
Ur Specific Gravity  <=1.005 A  (1.005-1.025)      
     
Urine Protein  Negative  (NEG/TRACE)  mg/dL    
     
Urine Glucose (UA)  Negative  (NEGATIVE)  mg/dL    
    
    
    
    
Assessment and Plan    
Assessment and Plan    
(1) General weakness:     
      Assessment and Plan:     
multifactorial, could be from not having BiPAP, to diabetic neuropathy worsening  
to a spinal radiculopathy. Physical therapy occupational therapy consults, will   
also check ESR, CRP, hemoglobin A1c and a thyroid study, and a vitamin B12. CBC,  
CMP neck x-rays head CT all within normal limits with exception of some   
hyperglycemia. Infectious workup has been negative, no signs of sepsis, will   
also check x-ray of the lumbar spine today.    
(2) Falls frequently:     
      Assessment and Plan:     
see #1    
(3) Diabetic foot ulcer:     
      Assessment and Plan:     
chronic ulcerations of bilateral lower extremities, wound consult, no   
leukocytosis or signs of sepsis will DC antibiotics.    
      Qualifiers:    
        Diabetes mellitus type: type 2  Diabetic foot ulcer location: other    
Laterality: unspecified laterality  Non-pressure ulcer stage: unspecified non-  
pressure ulcer stage  Qualified Code(s): E11.621 - Type 2 diabetes mellitus with  
foot ulcer; L97.509 - Non-pressure chronic ulcer of other part of unspecified   
foot with unspecified severity    
(4) Diabetes:     
      Assessment and Plan:     
we'll continue on home medications, will place on sliding scale insulin while   
here check hemoglobin A1c, will continue home Neurontin    
      Qualifiers:    
        Diabetes mellitus complication detail: with unspecified neuropathy    
Diabetes mellitus complication status: with neurologic complications  Diabetes   
mellitus long term insulin use: with long term use  Diabetes mellitus type: type  
2  Qualified Code(s): E11.40 - Type 2 diabetes mellitus with diabetic   
neuropathy, unspecified; Z79.4 - Long term (current) use of insulin    
(5) Hypertension:     
      Assessment and Plan:     
continue with her medications    
      Qualifiers:    
        Hypertension type: primary hypertension  Qualified Code(s): I10 -   
Essential (primary) hypertension    
(6) Spinal stenosis:     
      Assessment and Plan:     
and check x-ray the lumbar spine, tramadol as needed for pain    
      Qualifiers:    
        Spinal region: unspecified  Qualified Code(s): M48.00 - Spinal stenosis,  
site unspecified    
(7) DIVINE treated with BiPAP:     
      Assessment and Plan:     
we'll check with home supply company to see where his supplies are located    
(8) Blood clot in abdominal vein:     
      Assessment and Plan:     
abdominal incision healing nicely, continue lovenox.     
    
Plan    
Patient is a full code    
Daily Lovenox for deep vein thrombosis prophylaxis but is on therapeutic dose   
for mesenteric ischemia    
Patient as an observation status and is not expected to stay more than two mid  
[facility]

## 2023-09-27 NOTE — US_ITS
[place] [id number] 
     [phone] 
  
  
Patient Name: 
NICK BURTON 
  
MRN: [id number]    YOB: 1965    Sex: M 
[place] Location: [place] 
Current Patient Location: [place] 
Accession/Order Number: [id number] 
Exam Date: 9/27/2023  10:20    Report Date: 9/28/2023  05:05 
  
At the request of: 
ERICK TRICIA SISTER   
  
Procedure:  US carotid duplex BI 
  
EXAMINATION: US carotid duplex BI  
  
HISTORY: syncope 
  
COMPARISON: No relevant comparison available.  
  
TECHNIQUE: Duplex Doppler ultrasound analysis of carotid and vertebral  
arteries. . Bilateral carotid arterial duplex examination was performed using  
B-mode, color flow and spectral analysis. Carotid stenosis is reported  
according to validated velocity parameters, similar to NASCET criteria. 
  
FINDINGS:  
  
RIGHT CAROTID ARTERY  
Mild atherosclerotic plaque  
Subclavian: PSV: 126.0 cm/s cm/s EDV: 0.0 cm/s cm/s 
CCA: Prox: PSV: 100.3 cm/s cm/s EDV: 21.5 cm/s cm/s 
 Mid: PSV: 94.3 cm/s cm/s EDV: 21.5 cm/s cm/s  
 Distal: PSV: 55.9 cm/s cm/s EDV: 16.4 cm/s cm/s  
BULB: PSV: 58.1 cm/s cm/s EDV: 17.5 cm/s cm/s 
ICA: Prox: PSV: 63.6 cm/s cm/s EDV: 15.3 cm/s cm/s 
 Mid: PSV: 62.5 cm/s cm/s EDV: 20.8 cm/s cm/s  
 Distal: PSV: 58.1 cm/s cm/s EDV: 21.9 cm/s cm/s  
ECA: PSV: 109.7 cm/s cm/s EDV: 11.1 cm/s cm/s 
VERTEBRAL: PSV: 53.7 cm/s cm/s EDV: 9.8 cm/s cm/s, antegrade 
ICA/CCA ratio: PSV: 0.6 EDV: 0.7 
  
LEFT CAROTID ARTERY 
No atherosclerotic plaque  
Subclavian: PSV: 153.1 cm/s cm/s EDV: 13.7 cm/s  
CCA: Prox: PSV: 153.1 cm/s cm/s EDV: 30.0 cm/s 
 Mid: PSV: 104.3 cm/s cm/s EDV: 23.0 cm/s  
 Distal: PSV: 101.6 cm/s cm/s EDV: 24.1 cm/s  
BULB: PSV: 54.8 cm/s cm/s EDV: 20.8 cm/s 
ICA: Prox: PSV: 70.9 cm/s cm/s EDV: 28.9 cm/s  
 Mid: PSV: 77.4 cm/s cm/s EDV: 33.8 cm/s  
 Distal: PSV: 64.4 cm/s cm/s EDV: 28.9 cm/s  
ECA: PSV: 79.0 cm/s cm/s EDV: 11.1 cm/s  
VERTEBRAL: PSV: 61.2 cm/s cm/s EDV: 25.7 cm/s , antegrade 
ICA/CCA ratio: PSV: 0.5 EDV: 1.1 
  
ORDER #: [id number] US/US carotid duplex BI  
IMPRESSION:  
   
0-49% flow stenosis bilateral internal carotid arteries  
   
Spectral Doppler US Thresholds (Reference: Rm EG, et al. Radiology 2000;   
214:247-252)  
 Stenosis (%) PSV (cm/sec) VICA/VCCA  
 0-49 <150 <2.5  
 50-69 150-225 2.5-4.0  
 >70 >225 >4.0  
   
   
Electronically authenticated by: ALISE TEJEDA   Date: 9/28/2023  05:05

## 2023-09-27 NOTE — SWNOTE1
SW spoke with case management and did have a home bipap but the company had to fix it and never sent it back. SW called [facility] and they do have his home bipap and need pt to call them so they can send it back. SW asked if SW can 
call when he is discharged, but they stated they need to speak with pt. SW to call [facility] with pt. SW checked therapy notes and only outpatient recommended.

## 2023-09-27 NOTE — P.PODCN_ITS
hospitals - Podiatry    
Data of Consult    
Patient: new to practice    
Consult date: 09/27/23    
Requesting physician:     
Minnie Hoskins DO    
    
Primary care provider:     
DINH LNI    
    
Consult Narrative    
Reason for consult: bilateral plantar wounds    
Narrative:     
This is a 58-year-old male with past medical history consisting of   
hyperlipidemia, type 2 diabetes mellitus insulin-dependent, hypertension,   
peripheral neuropathy, GERD, depression and anxiety who presented to [facility]  
last night for generalized weakness and recent falls.  He was recently seen   
approximately 1 month ago for ischemic mesenteric colitis and transferred to   
[facility] where he underwent colectomy and was recently discharged home   
from acute rehab.  States while he was hospitalized following his surgery he was  
in an induced coma for 5 days and when he woke and began walking again as when   
he noticed the wounds on the bottom of his feet.  He admits to mild pain asso  
ciated with the wounds.  Denies any drainage.  He does also admit to chronic   
back pain rating aiding down the lower right side and right knee.  No history of  
foot drop.  Currently on Lovenox.  Denies any claudication type symptoms or   
history of known vascular disease.  Denies any other acute complaints of the   
bilateral lower extremity.  Denies any constitutional symptoms at time of visit.    
cc::     
CC: Minnie Hoskins DO    
    
    
Review of Systems    
    
    
ROS      
    
 Status of ROS 10 or more systems reviewed and unremarkable except as noted in   
history and below       
    
    
[facility]    
Medical History (Updated 09/27/23 @ 13:45 by Srikanth Woodall DPM)    
    
    
    
Surgical History (Reviewed 09/27/23 @ 10:37 by Minnie Hoskins DO)    
    
    
    
Family History (Reviewed 09/27/23 @ 10:37 by Minnie Hoskins DO)    
Brother   Family history of stroke    
Other   Family history of hypertension    
    
    
    
Social History (Reviewed 09/27/23 @ 10:37 by Minnie Hoskins DO)    
Within the past year, how often did you have a drink containing alcohol:  never     
Score interpretation:  A score less than 4 is consistent with normal alcohol   
consumption.     
Smoking status:  Never smoker     
Non-prescribed substance use:  former substance user     
Non-prescribed substance use details:  patient states drug use  back in the 80s   
    
Previous occupational history:  on disability     
Highest level of school completed/degree received:  Associate degree: academic   
program     
In a typical week, how many times do you talk on the telephone with family,   
friends, or neighbors:  once per week     
How often do you get together with friends or relatives:  once per week     
Little interest or pleasure in doing things:  several days     
Feeling down, depressed, or hopeless:  several days     
Feel stressed/tense/nervous/anxious/difficulty sleeping:  to some extent     
Do you think of yourself as:  straight/heterosexual     
Gender Identity:  male     
    
    
    
Exam    
Narrative    
Exam Narrative:     
Vascular: DP and PT pulses strongly palpable bilaterally.  CFT intact to digits.  
 Skin temperature warm and symmetric without focal increase.  Mild RILEY   
ulcerative erythema less than 1 cm.  No edema or ecchymosis.    
Neuro: Light touch gross sensation intact.  Protective sensation diminished.    
Derm: Unstageable pressure ulceration with overlying dry stable eschar bilateral  
plantar forefoot.  Right foot ulceration approximately 3 cm x 2 and half   
centimeters.  Left foot ulceration approximately 1 and half centimeters by 1 cm.  
 No active drainage.  Mild surrounding hyperkeratosis.  No signs of acute   
infection.  No fluctuance crepitus or bogginess.    
MSK: Muscle strength 5/5 for all 4 pedal groups.  Ankle subtalar range of motion  
supple without pain or crepitus.  Mild palpatory tenderness elicited to   
bilateral plantar forefoot wounds as described above.  No other palpatory   
tenderness elicited upon exam.  Compartments are soft compressible no pain with   
calf or thigh compression.    
Constitutional    
Vital Signs, click to edit/add:     
                                Last Vital Signs    
    
    
    
Temp  98 F   09/27/23 13:15    
     
Pulse  90   09/27/23 13:15    
     
Resp  20   09/27/23 13:15    
     
BP  130/77   09/27/23 13:15    
     
Pulse Ox  92 L  09/27/23 13:15    
     
O2 Del Method  Room Air  09/27/23 13:15    
    
    
    
    
Assessment and Plan    
Assessment and Plan    
(1) General weakness:     
(2) Falls frequently:     
(3) Diabetic foot ulcer:     
      Qualifiers:    
        Diabetes mellitus type: type 2  Diabetic foot ulcer location: other    
Laterality: unspecified laterality  Non-pressure ulcer stage: unspecified non-  
pressure ulcer stage  Qualified Code(s): E11.621 - Type 2 diabetes mellitus with  
foot ulcer; L97.509 - Non-pressure chronic ulcer of other part of unspecified   
foot with unspecified severity    
(4) Diabetes:     
      Qualifiers:    
        Diabetes mellitus type: type 2  Diabetes mellitus long term insulin use:  
with long term use  Diabetes mellitus complication status: with neurologic   
complications  Diabetes mellitus complication detail: with unspecified   
neuropathy  Qualified Code(s): E11.40 - Type 2 diabetes mellitus with diabetic   
neuropathy, unspecified; Z79.4 - Long term (current) use of insulin    
(5) Hypertension:     
      Qualifiers:    
        Hypertension type: primary hypertension  Qualified Code(s): I10 -   
Essential (primary) hypertension    
(6) Spinal stenosis:     
      Qualifiers:    
        Spinal region: unspecified  Qualified Code(s): M48.00 - Spinal stenosis,  
site unspecified    
(7) DIVINE treated with BiPAP:     
(8) Blood clot in abdominal vein:     
(9) Unstageable pressure ulcer of left foot:     
(10) Unstageable pressure ulcer of right foot:     
(11) Acute hyperglycemia:     
    
Plan    
Patient examined and evaluated.  All findings discussed with patient all   
questions answered to patient's satisfaction.    
Labs and imaging reviewed.  No leukocytosis.  ESR and CRP within normal limits.    
X-ray did not show any signs of cortical erosive changes in the regions of the   
ulceration.    
Patient has bilateral unstageable pressure related ulcerations possibly from   
prolonged bedrest.  Overlying eschars are dry and stable to both feet.  Will   
begin daily dressings with Santyl, dry sterile dressing to be changed daily.    
May weight-bear as tolerated.    
No indication for acute surgical intervention.    
Stable to DC from podiatry's perspective with 1 week follow-up in the wound   
center.    
Rest per primary.    
We will follow.  Please call with questions or concerns.

## 2023-09-27 NOTE — PM.PODCN1
[facility] - Podiatry
Data of Consult
Patient: new to practice
Consult date: 09/27/23
Requesting physician: 
Minnie Hoskins DO

Primary care provider: 
DINH LIN

Consult Narrative
Reason for consult: bilateral plantar wounds
Narrative: 
This is a 58-year-old male with past medical history consisting of hyperlipidemia, type 2 diabetes mellitus insulin-dependent, hypertension, peripheral neuropathy, GERD, depression and anxiety who presented to [place] ED last night for generalized 
weakness and recent falls.  He was recently seen approximately 1 month ago for ischemic mesenteric colitis and transferred to [facility] where he underwent colectomy and was recently discharged home from acute rehab.  States while he was 
hospitalized following his surgery he was in an induced coma for 5 days and when he woke and began walking again as when he noticed the wounds on the bottom of his feet.  He admits to mild pain associated with the wounds.  Denies any drainage.  He 
does also admit to chronic back pain rating aiding down the lower right side and right knee.  No history of foot drop.  Currently on Lovenox.  Denies any claudication type symptoms or history of known vascular disease.  Denies any other acute 
complaints of the bilateral lower extremity.  Denies any constitutional symptoms at time of visit.
cc:: 
CC: Minnie Hoskins DO


Review of Systems
ROS  
 Status of ROS 10 or more systems reviewed and unremarkable except as noted in history and below   

[facility]
Medical History (Updated 09/27/23 @ 13:45 by Srikanth Woodall DPM)



Surgical History (Reviewed 09/27/23 @ 10:37 by Minnie Hoskins DO)



Family History (Reviewed 09/27/23 @ 10:37 by Minnie Hoskins DO)
Brother
 Family history of stroke
Other
 Family history of hypertension



Social History (Reviewed 09/27/23 @ 10:37 by Minnie Hoskins DO)
Within the past year, how often did you have a drink containing alcohol:  never 
Score interpretation:  A score less than 4 is consistent with normal alcohol consumption. 
Smoking status:  Never smoker 
Non-prescribed substance use:  former substance user 
Non-prescribed substance use details:  patient states drug use  back in the 80s  
Previous occupational history:  on disability 
Highest level of school completed/degree received:  Associate degree: academic program 
In a typical week, how many times do you talk on the telephone with family, friends, or neighbors:  once per week 
How often do you get together with friends or relatives:  once per week 
Little interest or pleasure in doing things:  several days 
Feeling down, depressed, or hopeless:  several days 
Feel stressed/tense/nervous/anxious/difficulty sleeping:  to some extent 
Do you think of yourself as:  straight/heterosexual 
Gender Identity:  male 



Exam
Narrative
Exam Narrative: 
Vascular: DP and PT pulses strongly palpable bilaterally.  CFT intact to digits.  Skin temperature warm and symmetric without focal increase.  Mild RILEY ulcerative erythema less than 1 cm.  No edema or ecchymosis.
Neuro: Light touch gross sensation intact.  Protective sensation diminished.
Derm: Unstageable pressure ulceration with overlying dry stable eschar bilateral plantar forefoot.  Right foot ulceration approximately 3 cm x 2 and half centimeters.  Left foot ulceration approximately 1 and half centimeters by 1 cm.  No active 
drainage.  Mild surrounding hyperkeratosis.  No signs of acute infection.  No fluctuance crepitus or bogginess.
MSK: Muscle strength 5/5 for all 4 pedal groups.  Ankle subtalar range of motion supple without pain or crepitus.  Mild palpatory tenderness elicited to bilateral plantar forefoot wounds as described above.  No other palpatory tenderness elicited 
upon exam.  Compartments are soft compressible no pain with calf or thigh compression.
Constitutional
Vital Signs, click to edit/add: 
Last Vital Signs

Temp  98 F   09/27/23 13:15
Pulse  90   09/27/23 13:15
Resp  20   09/27/23 13:15
BP  130/77   09/27/23 13:15
Pulse Ox  92 L  09/27/23 13:15
O2 Del Method  Room Air  09/27/23 13:15



Assessment and Plan
Assessment and Plan
(1) General weakness: 
(2) Falls frequently: 
(3) Diabetic foot ulcer: 
      Qualifiers:
        Diabetes mellitus type: type 2  Diabetic foot ulcer location: other  Laterality: unspecified laterality  Non-pressure ulcer stage: unspecified non-pressure ulcer stage  Qualified Code(s): E11.621 - Type 2 diabetes mellitus with foot ulcer; 
L97.509 - Non-pressure chronic ulcer of other part of unspecified foot with unspecified severity
(4) Diabetes: 
      Qualifiers:
        Diabetes mellitus type: type 2  Diabetes mellitus long term insulin use: with long term use  Diabetes mellitus complication status: with neurologic complications  Diabetes mellitus complication detail: with unspecified neuropathy  Qualified 
Code(s): E11.40 - Type 2 diabetes mellitus with diabetic neuropathy, unspecified; Z79.4 - Long term (current) use of insulin
(5) Hypertension: 
      Qualifiers:
        Hypertension type: primary hypertension  Qualified Code(s): I10 - Essential (primary) hypertension
(6) Spinal stenosis: 
      Qualifiers:
        Spinal region: unspecified  Qualified Code(s): M48.00 - Spinal stenosis, site unspecified
(7) DIVINE treated with BiPAP: 
(8) Blood clot in abdominal vein: 
(9) Unstageable pressure ulcer of left foot: 
(10) Unstageable pressure ulcer of right foot: 
(11) Acute hyperglycemia: 

Plan
Patient examined and evaluated.  All findings discussed with patient all questions answered to patient's satisfaction.
Labs and imaging reviewed.  No leukocytosis.  ESR and CRP within normal limits.
X-ray did not show any signs of cortical erosive changes in the regions of the ulceration.
Patient has bilateral unstageable pressure related ulcerations possibly from prolonged bedrest.  Overlying eschars are dry and stable to both feet.  Will begin daily dressings with Santyl, dry sterile dressing to be changed daily.
May weight-bear as tolerated.
No indication for acute surgical intervention.
Stable to DC from podiatry's perspective with 1 week follow-up in the wound center.
Rest per primary.
We will follow.  Please call with questions or concerns.

## 2023-09-27 NOTE — P.PN_ITS
Progress Note: Subjective    
Subjective    
Interval history:     
CC: Recurrent falls    
    
HPI: This is a very pleasant 58 years old male who presents with above   
complaints.  Patient's past medical history significant for diabetes,   
dyslipidemia, recent surgery for mesenteric ischemia.  Patient has been   
recuperating at home.  Patient stating that he tried to help his   
neighbor/relative with some chores and probably overexerted himself.  He felt   
that his legs were very weak and buckled under him.  After the first fall he was  
able to stand up by himself.  He needed help after the second fall.  After the   
third fall he was not able to stand up anymore.  Patient endorses chronic low   
back pain with bilateral radiculopathy and spinal stenosis.  Evaluation in the   
emergency room has been unremarkable besides finding of black eschar covered   
wounds on both soles of his feet.    
    
Exam    
Narrative    
Exam Narrative:     
ROS:    
1.General: no fever, chills, not in distress    
 2.HEENT: no HA, no blurry vision, no swallow problems, no nasal congestion, no   
sore throat    
 3.Pulmonary: no cough, SOB, wheezes    
 4.CVS: no CP, no palpitations, no FISCHER, no SOB, no intermittent claudication    
 5.GI: no nausea, vomiting or diarrhea, no abdominal pain, no constipation, no   
hematemesis or hematochezia    
 6.: no renal colic, no hematuria, urinary frequency or urgency    
 7.Extremities: no edema    
 8.Neurological: no dizziness, vertigo, double or blurry vision, no no focal   
weakness, no paresthesia, no swallow or speech problems    
 9.Musculosceletal: no joint pains, no joint swelling, no back pain    
 10.Dermatological: no skin rashes, no lesions, no pruritus    
 11.Hematological: no bleeding, no hx/o clots    
 12.Endocrinological: no heat/cold intolerance, no hx/o diabetes    
 13.Psychiatric: no suicidal or homicidal thoughts    
    
    
 Physical Exam:    
Not in distress, pleasant, lucid, cooperative,    
 Head - atraumatic, eyes - pupils equal, round, reactive to light, extra ocular   
movement intact, MMM    
 Neck - supple, thyroid not enlarged, LN not palpated    
 Lungs - clear to auscultation, no dullness on percussion    
 CVS - heart sounds S1, S2, no additional murmurs gallop, regular rate and   
rhythm    
 Gastrointestinal?abdomen is soft, non-tender, non-distended, no organomegaly,   
positive bowel sounds    
 Extremities no clubbing, cyanosis or edema    
 Neurological?cranial nerve II?XII grossly intact, no meningeal signs, no   
cerebellar signs, no sensory deficit    
 Musculoskeletal -  joints, no effusions, ROM preserved    
 Dermatological - the skin dry, warm, good color covered wounds on the soles of   
both of his feet    
 Psychiatric?patient is AAO X3, patient has normal affect    
Constitutional    
Vital Signs, click to edit/add:     
                                Last Vital Signs    
    
    
    
Temp  98.0 F   09/27/23 01:12    
     
Pulse  87   09/27/23 02:25    
     
Resp  18   09/27/23 01:12    
     
BP  109/68   09/27/23 02:25    
     
Pulse Ox  94 L  09/27/23 01:12    
     
O2 Del Method  Room Air  09/27/23 01:12    
    
    
    
    
Progress Note: Objective    
Labs    
Labs:     
                                    Short CBC    
    
    
    
  09/26/23 Range/Units    
    
  20:49     
     
WBC  7.0  (4.0-11.0)  10^3/uL    
     
Hgb  9.8 L  (14.0-18.0)  g/dL    
     
Hct  32.4 L  (42.0-54.0)  %    
     
Plt Count  181  (150-450)  10^3/uL    
    
    
                                       BMP    
    
    
    
  09/26/23    
    
  20:49    
     
Sodium  141    
     
Potassium  3.5    
     
Chloride  108 H    
     
Carbon Dioxide  26.0    
     
BUN  8.0    
     
Creatinine  0.88    
     
Glucose  163 H    
     
Calcium  7.4 L    
    
    
                                 Liver Function    
    
    
    
  09/26/23 Range/Units    
    
  20:49     
     
Total Bilirubin  0.2  (0.2-1.0)  mg/dL    
     
AST  24  (15-37)  U/L    
     
ALT  32  (16-63)  U/L    
     
Alkaline Phosphatase  127 H  ()  U/L    
     
Albumin  2.4 L  (3.4-5.0)  g/dL    
    
    
                                      Urine    
    
    
    
  09/26/23 Range/Units    
    
  21:40     
     
Urine Color  Lt. yellow  (YELLOW)      
     
Urine Clarity  Clear  (CLEAR)      
     
Urine pH  5.5  (5.0-9.0)      
     
Ur Specific Gravity  <=1.005 A  (1.005-1.025)      
     
Urine Protein  Negative  (NEG/TRACE)  mg/dL    
     
Urine Glucose (UA)  Negative  (NEGATIVE)  mg/dL    
    
    
    
    
Progress Note: A&P    
Assessment and Plan    
(1) General weakness:     
      Assessment and Plan:     
Multifactorial    
(2) Falls frequently:     
      Assessment and Plan:     
Fall precautions in place.  Suspect related to lower extremity weakness due to   
spinal stenosis which flared up after recent exertion.  Follow-up with physical   
clinic patient therapy.  I ordered additional work-up including orthostatic   
vital signs, carotid ultrasound and echocardiogram to rule out possibility of   
additional causes for falls.  I also suspect patient has diabetic neuropathy   
which also contributed to unstable gait    
(3) Diabetic foot ulcer:     
      Assessment and Plan:     
No signs of sepsis.  Dr. iRchards consulted.  Continue with empiric   
antibiotics    
(4) Diabetes:     
      Assessment and Plan:     
Continue with ADA diet.  Coverage with insulin sliding scale    
(5) Hypertension:     
      Assessment and Plan:     
Resume home regimen.  Adjust as needed    
(6) Spinal stenosis:     
      Assessment and Plan:     
As above.  Patient might need additional imaging    
    
Plan    
END:    
As the provider for the telehealth service, I attest that I introduced myself to  
the patient, provided my credentials, disclosed by location and determined that   
based on a review of the patient's chart and discussion with members of the   
patient's treatment team, telemedicine via real-time, 2 way, and interactive   
audio and video platform is an appropriate and effective means of providing the   
service. ?The patient and I mutually agree this visit is appropriate for   
telemedicine. ?[place] ?The   
encounter took approximately 35 minutes. ?The nurse was present during the   
entire time and I was able to move the stethoscope in appropriate directions.   
?The patient was evaluated at the Hospital    
?    
Portions of this note may be dictated using Dragon voice recognition software.   
Variances in spelling and vocabulary are possible and unintentional. Not all   
errors may be caught and/or corrected. Please notify the author if any   
discrepancies are noted and/or if the meaning of any statement is unclear.?    
?    
Patient verbally consented for treatment via video visit with patient currently   
located at the [facility] and provider located in [place].    
    
Telemedicine Attestation    
Telemedicine Attestation    
I conducted this encounter from [place] via secure live, face-to-face video   
conference with the patient, located at THE [facility] with [frequent   
falls].    
Prior to the interview, the risks and benefits of telemedicine were discussed   
with the patient and verbal consent was obtained.

## 2023-09-27 NOTE — PC.NURSE
Patient has ulcers to both bottom of feet right foot noted a 3x4mm black area no drainage noted, and left foot noted two black areas both measuring 1x1mm no drainage. Patient states  I got them in [facility] .

## 2023-09-27 NOTE — PM.HP
H&P: HPI
History of Present Illness
Chief complaint: Falls
Narrative: 
patient is a 4-year-old male with past medical history as ischemic mesenteric colitis status post colectomy and just got discharged home from a acute rehab facility.patient has a past medical history of hyperlipidemia, type 2 diabetes 
insulin-dependent, hypertension, diabetic neuropathy, GERD, depression with anxiety who presented to the emergency department yesterday evening with generalized weakness. Patient states that he overexerted himself with trips to the grocery store and 
gas station and fell about three times yesterday. He denies any loss of consciousness or hitting his head, he reports just feeling very weak in both of his legs and falling into the mulch. He has a long history of diabetic neuropathy and also has 
chronic bilateral diabetic foot ulcers. He reports his sugars have been somewhat high over the last couple weeks. He also notes he has obstructive sleep apnea and wears a BiPAP but is awaiting home medical supplies for his BiPAP and has not used in 
over three weeks. He also complains of chronic back pain no recent trauma or prior surgeries and states that the majority of his pain is in the lower back right side radiating to behind his right knee. He denies any foot drop bowel or bladder issues 
such as incontinence. He reports his abdominal wound has been healing well, he does note that he was at home and has not done any physical therapy since being released from skilled nursing facility in [place] about a week ago. He's been taking his 
medications as prescribed and has been giving himself Lovenox injections. No other issues or concerns today.

Review of Systems
ROS  
 Narrative ROS: a complete review of systems were reviewed with patient and are positive as below or listed in History of Chief Complaint.
General: no fever, chills, night sweats
Head: no headache, trauma, visual changes, nausea or vomiting
Skin: both feet have ulcers
Eyes: no blurriness of vision
Ears: no reported hearing loss, vertigo, earache, or tinnitus 
Throat: no sore throat, hoarseness, swelling of neck, or tongue pain
Heart: no chest pain
Lungs: no shortness of breath or cough
GI: no diarrhea or vomiting/nausea
Urinary: no urinary urgency, frequency or pain
Neuro: bilateral numbness and tingling of feet
HEM: no bleeding issues or bruising
ENDO: no thyroid problems
Psych: no anxiety or depression
   

[facility]
Medical History (Updated 09/27/23 @ 10:43 by Minnie Hoskins DO)



Surgical History (Reviewed 09/27/23 @ 10:37 by Minnie Hoskins DO)



Family History (Reviewed 09/27/23 @ 10:37 by Minnie Hoskins DO)
Brother
 Family history of stroke
Other
 Family history of hypertension



Social History (Reviewed 09/27/23 @ 10:37 by Minnie Hoskins DO)
Within the past year, how often did you have a drink containing alcohol:  never 
Score interpretation:  A score less than 4 is consistent with normal alcohol consumption. 
Smoking status:  Never smoker 
Non-prescribed substance use:  former substance user 
Non-prescribed substance use details:  patient states drug use  back in the 80s  
Previous occupational history:  on disability 
Highest level of school completed/degree received:  Associate degree: academic program 
In a typical week, how many times do you talk on the telephone with family, friends, or neighbors:  once per week 
How often do you get together with friends or relatives:  once per week 
Little interest or pleasure in doing things:  several days 
Feeling down, depressed, or hopeless:  several days 
Feel stressed/tense/nervous/anxious/difficulty sleeping:  to some extent 
Do you think of yourself as:  straight/heterosexual 
Gender Identity:  male 



Meds
Home Medications and Allergies
Home Medications

 Medication  Instructions  Recorded  Confirmed  Type
amlodipine 10 mg tablet 10 mg PO DAILY 09/26/23 09/27/23 History
atorvastatin 40 mg tablet 40 mg PO DAILY 09/26/23 09/27/23 History
buspirone 10 mg tablet 10 mg PO BID 09/26/23 09/27/23 History
enoxaparin 100 mg/mL subcutaneous 100 mg subcut Q12H 09/26/23 09/27/23 History
syringe    
gabapentin 100 mg capsule 300 mg PO Q8H 09/26/23 09/27/23 History
losartan 25 mg tablet 25 mg PO DAILY 09/26/23 09/27/23 History
buspirone 30 mg tablet 30 mg PO BID 09/27/23 09/27/23 History
insulin glargine 100 unit/mL (3 18 unit subcut .qhs 09/27/23 09/27/23 History
mL) subcutaneous pen (Lantus    
Solostar U-100 Insulin)    
insulin lispro 100 unit/mL 4 unit subcut TIDWM 09/27/23 09/27/23 History
subcutaneous pen    
mirtazapine 30 mg tablet 30 mg PO DAILY 09/27/23 09/27/23 History
omeprazole 40 mg capsule,delayed 40 mg PO DAILY 09/27/23 09/27/23 History
release    
potassium chloride 20 mEq 20 meq PO DAILY 09/27/23 09/27/23 History
tablet,extended release(part/cryst)    
venlafaxine 150 mg 300 mg PO QDAY 09/27/23 09/27/23 History
capsule,extended release 24 hr    


Allergies

Allergy/AdvReac Type Severity Reaction Status Date / Time
No Known Drug Allergies Allergy   Verified 09/26/23 20:14



Exam
Narrative
Exam Narrative: 
General: Patient is alert, and oriented to person, place and time with normal affect, proper hygiene 
Skin: 2 black ulcers on the bottom of left forefoot about dime sized, larger 2cm x 2cm ulceration with necrotic center on the right forefoot, no discharge or drainage, no erythema
Head: atraumatic, acephalic
Eyes: PERRLA, no nystagmus present, conjunctiva clear, no scleral icterus
Ears: normal gross auditory acuity 
Neck: no masses palpated, normal thyroid, no JVD or audible carotid bruits 
Heart: Normal rate and rhythm, no murmurs/rubs/gallops
Lungs: no audible wheezes, crackles and normal breath sounds all lung fields 
Abdomen: Normal audible bowel sounds, no distension, No palpable masses, no organomegaly, no rebound/guarding/ or rigidity, abdominal incision healing, no discharge or drainage
Musculoskeletal: ROM is limited due to being in hospital bed, no swelling bilateral lower extremities but 5/5 strength bilateral lower extremities 
Vascular: Normal carotid, radial, femoral, posterior tibial, and dorsalis pedis pulses
Lymph: no supraclavicular, axillary, or anterior/posterior cervical adenopathy 
Neuro: CN II-X grossly intact, normal sensation upper and lower extremities
Constitutional
Vital Signs, click to edit/add: 
Last Vital Signs

Temp  98.1 F   09/27/23 05:51
Pulse  87   09/27/23 07:42
Resp  18   09/27/23 05:51
BP  125/77   09/27/23 07:42
Pulse Ox  95   09/27/23 05:51
O2 Del Method  Room Air  09/27/23 05:51



Results
Labs
Labs: 
Short CBC

  09/26/23 09/27/23 Range/Units
  20:49 04:56 
WBC  7.0  5.3  (4.0-11.0)  10^3/uL
Hgb  9.8 L  9.1 L  (14.0-18.0)  g/dL
Hct  32.4 L  30.2 L  (42.0-54.0)  %
Plt Count  181  155  (150-450)  10^3/uL

BMP

  09/26/23 09/27/23
  20:49 04:56
Sodium  141  145
Potassium  3.5  3.5
Chloride  108 H  112 H
Carbon Dioxide  26.0  28.5
BUN  8.0  6.0 L
Creatinine  0.88  0.71
Glucose  163 H  225 H
Calcium  7.4 L  7.3 L

Liver Function

  09/26/23 Range/Units
  20:49 
Total Bilirubin  0.2  (0.2-1.0)  mg/dL
AST  24  (15-37)  U/L
ALT  32  (16-63)  U/L
Alkaline Phosphatase  127 H  ()  U/L
Albumin  2.4 L  (3.4-5.0)  g/dL

Urine

  09/26/23 Range/Units
  21:40 
Urine Color  Lt. yellow  (YELLOW)  
Urine Clarity  Clear  (CLEAR)  
Urine pH  5.5  (5.0-9.0)  
Ur Specific Gravity  <=1.005 A  (1.005-1.025)  
Urine Protein  Negative  (NEG/TRACE)  mg/dL
Urine Glucose (UA)  Negative  (NEGATIVE)  mg/dL



Assessment and Plan
Assessment and Plan
(1) General weakness: 
      Assessment and Plan: 
multifactorial, could be from not having BiPAP, to diabetic neuropathy worsening to a spinal radiculopathy. Physical therapy occupational therapy consults, will also check ESR, CRP, hemoglobin A1c and a thyroid study, and a vitamin B12. CBC, CMP 
neck x-rays head CT all within normal limits with exception of some hyperglycemia. Infectious workup has been negative, no signs of sepsis, will also check x-ray of the lumbar spine today.
(2) Falls frequently: 
      Assessment and Plan: 
see #1
(3) Diabetic foot ulcer: 
      Assessment and Plan: 
chronic ulcerations of bilateral lower extremities, wound consult, no leukocytosis or signs of sepsis will DC antibiotics.
      Qualifiers:
        Diabetes mellitus type: type 2  Diabetic foot ulcer location: other  Laterality: unspecified laterality  Non-pressure ulcer stage: unspecified non-pressure ulcer stage  Qualified Code(s): E11.621 - Type 2 diabetes mellitus with foot ulcer; 
L97.509 - Non-pressure chronic ulcer of other part of unspecified foot with unspecified severity
(4) Diabetes: 
      Assessment and Plan: 
we'll continue on home medications, will place on sliding scale insulin while here check hemoglobin A1c, will continue home Neurontin
      Qualifiers:
        Diabetes mellitus complication detail: with unspecified neuropathy  Diabetes mellitus complication status: with neurologic complications  Diabetes mellitus long term insulin use: with long term use  Diabetes mellitus type: type 2  Qualified 
Code(s): E11.40 - Type 2 diabetes mellitus with diabetic neuropathy, unspecified; Z79.4 - Long term (current) use of insulin
(5) Hypertension: 
      Assessment and Plan: 
continue with her medications
      Qualifiers:
        Hypertension type: primary hypertension  Qualified Code(s): I10 - Essential (primary) hypertension
(6) Spinal stenosis: 
      Assessment and Plan: 
and check x-ray the lumbar spine, tramadol as needed for pain
      Qualifiers:
        Spinal region: unspecified  Qualified Code(s): M48.00 - Spinal stenosis, site unspecified
(7) DIVINE treated with BiPAP: 
      Assessment and Plan: 
we'll check with home supply [employer] to see where his supplies are located
(8) Blood clot in abdominal vein: 
      Assessment and Plan: 
abdominal incision healing nicely, continue lovenox. 

Plan
Patient is a full code
Daily Lovenox for deep vein thrombosis prophylaxis but is on therapeutic dose for mesenteric ischemia
Patient as an observation status and is not expected to stay more than two midnighst

## 2023-09-27 NOTE — XR_ITS
[facility] [id number] 
     [phone] 
  
  
Patient Name: 
NICK BURTON 
  
MRN: [id number]    YOB: 1965    Sex: M 
[place] Location: [place] 
Current Patient Location: [facility] 
Accession/Order Number: [id number] 
Exam Date: 9/27/2023  10:00    Report Date: 9/27/2023  10:26 
  
At the request of: 
AURY FONTANEZ   
  
Procedure:  XR lumbar spine 2-3V 
  
EXAMINATION: XR lumbar spine 2-3V, [id number] 
  
HISTORY: bilateral leg weakness with radiculopathy 
  
COMPARISON: CT abdomen/pelvis 8/10/2023 
  
FINDINGS: 
There are 5 nonrib-bearing lumbar-type vertebral bodies. 
  
No acute fracture or suspicious osseous lesion. 
  
Mild retrolisthesis of L3 on L4, similar.  
  
Mild intervertebral disc height loss throughout the lumbar spine with moderate  
  
osteophytosis at L1-L2 and L3-L5 with lesser degenerative changes throughout  
the remainder. 
  
Surgical staple line projecting over the left hemiabdomen and coils projecting  
  
over the left pelvis. 
  
ORDER #: [id number] XR/XR lumbar spine 2-3V  
IMPRESSION:  
1. No acute osseous mallet.  
2. Mild retrolisthesis of L3 on L4, similar.  
3. Moderate lumbar spondylosis.  
   
   
Electronically authenticated by: STACEY CORTES   Date: 9/27/2023  10:26

## 2023-09-28 VITALS
DIASTOLIC BLOOD PRESSURE: 64 MMHG | HEART RATE: 85 BPM | OXYGEN SATURATION: 95 % | SYSTOLIC BLOOD PRESSURE: 104 MMHG | TEMPERATURE: 98.06 F | RESPIRATION RATE: 18 BRPM

## 2023-09-28 VITALS — HEART RATE: 80 BPM | OXYGEN SATURATION: 98 % | RESPIRATION RATE: 12 BRPM

## 2023-09-28 VITALS — SYSTOLIC BLOOD PRESSURE: 123 MMHG | DIASTOLIC BLOOD PRESSURE: 75 MMHG

## 2023-09-28 LAB
ADD MANUAL DIFF: NO
ALANINE AMINOTRANSFERASE: 17 U/L (ref 16–63)
ALBUMIN GLOBULIN RATIO: 0.6
ALBUMIN LEVEL: 2 G/DL (ref 3.4–5)
ALKALINE PHOSPHATASE: 90 U/L (ref 46–116)
ANION GAP: 8.7
ASPARTATE AMINO TRANSFERASE: 23 U/L (ref 15–37)
BLOOD UREA NITROGEN: 4 MG/DL (ref 7–18)
CALCIUM: 7.5 MG/DL (ref 8.5–10.1)
CARBON DIOXIDE: 28.7 MMOL/L (ref 21–32)
CHLORIDE: 110 MMOL/L (ref 98–107)
CO2 BLD-SCNC: 28.7 MMOL/L (ref 21–32)
ESTIMATED GFR (AFRICAN AMERICA: >60 (ref 60–?)
ESTIMATED GFR (NON-AFRICAN AME: >60 (ref 60–?)
GLOBULIN: 3.3 G/DL
GLUCOSE BLD-MCNC: 140 MG/DL (ref 74–106)
HCT VFR BLD CALC: 29 % (ref 42–54)
HEMATOCRIT: 29 % (ref 42–54)
HEMOGLOBIN: 8.9 G/DL (ref 14–18)
IMMATURE GRANULOCYTES ABS AUTO: 0.02 10^3/UL (ref 0–0.03)
IMMATURE GRANULOCYTES PCT AUTO: 0.4 % (ref 0–0.5)
LYMPHOCYTES  ABSOLUTE AUTO: 1.4 10^3/UL (ref 1.2–3.8)
MCV RBC: 92.1 FL (ref 80–94)
MEAN CORPUSCULAR HEMOGLOBIN: 28.3 PG (ref 25.9–34)
MEAN CORPUSCULAR HGB CONC: 30.7 G/DL (ref 29.9–35.2)
MEAN CORPUSCULAR VOLUME: 92.1 FL (ref 80–94)
PLATELET # BLD: 126 10^3/UL (ref 150–450)
PLATELET COUNT: 126 10^3/UL (ref 150–450)
POTASSIUM SERPLBLD-SCNC: 3.4 MMOL/L (ref 3.5–5.1)
POTASSIUM: 3.4 MMOL/L (ref 3.5–5.1)
RED BLOOD COUNT: 3.15 10^6/UL (ref 4.7–6.1)
SODIUM BLD-SCNC: 144 MMOL/L (ref 136–145)
SODIUM: 144 MMOL/L (ref 136–145)
TOTAL PROTEIN: 5.3 G/DL (ref 6.4–8.2)
WBC # BLD: 5 10^3/UL (ref 4–11)
WHITE BLOOD COUNT: 5 10^3/UL (ref 4–11)

## 2023-09-28 RX ADMIN — BUSPIRONE HYDROCHLORIDE 30 MG: 15 TABLET ORAL at 08:51

## 2023-09-28 RX ADMIN — LOSARTAN POTASSIUM 25 MG: 25 TABLET, FILM COATED ORAL at 08:51

## 2023-09-28 RX ADMIN — VENLAFAXINE HYDROCHLORIDE 300 MG: 150 CAPSULE, EXTENDED RELEASE ORAL at 08:50

## 2023-09-28 RX ADMIN — ATORVASTATIN CALCIUM 40 MG: 40 TABLET, FILM COATED ORAL at 08:51

## 2023-09-28 RX ADMIN — GABAPENTIN 300 MG: 300 CAPSULE ORAL at 05:15

## 2023-09-28 RX ADMIN — POTASSIUM CHLORIDE 20 MEQ: 750 TABLET, EXTENDED RELEASE ORAL at 08:51

## 2023-09-28 RX ADMIN — ACETAMINOPHEN 650 MG: 325 TABLET ORAL at 11:54

## 2023-09-28 RX ADMIN — AMLODIPINE BESYLATE 10 MG: 5 TABLET ORAL at 08:50

## 2023-09-28 RX ADMIN — PREDNISONE 40 MG: 20 TABLET ORAL at 08:51

## 2023-09-28 RX ADMIN — INSULIN ASPART 0 UNIT: 100 INJECTION, SOLUTION INTRAVENOUS; SUBCUTANEOUS at 11:54

## 2023-09-28 RX ADMIN — OMEPRAZOLE 40 MG: 40 CAPSULE, DELAYED RELEASE ORAL at 08:51

## 2023-09-28 RX ADMIN — COLLAGENASE SANTYL 1 APPLIC: 250 OINTMENT TOPICAL at 08:52

## 2023-09-28 RX ADMIN — ENOXAPARIN SODIUM 100 MG: 100 INJECTION SUBCUTANEOUS at 08:50

## 2023-09-28 NOTE — SWNOTE1
Pt will either need HH or come as an ACE pt for daily dressing changes. MAYI spoke with pt and he does drive and have his car at home. He does prefer to be an ACE pt as he has concerns about it getting infected and does not feel he will do it 
appropriately. MAYI did let him know Home health could come in, it just usually is not daily. Pt voiced understanding and would like to be an ACE pt. MAYI let nursing know. Pt also asked about outpt therapy. MAYI to ask doctor. At this time pt will have 
to follow up with PCP to get outpt therapy scheduled. MAYI to notify pt. Pt will need ride home with trips. MAYI called trips and they will be here between 1-1:30. MAYI let nursing know.

## 2023-09-28 NOTE — P.DS_ITS
DS: Providers    
Provider    
Date of admission:     
09/27/23 00:51    
    
Primary care physician:     
DINH LIN    
    
Admitting clinician: Gopi Machuca    
Consults:     
                                            
    
09/27/23 03:00    
Consult to Podiatry Routine     
   Consulting Provider: Bronson Richards    
   Reason for consultation: dibaetic foot    
   Has provider been notified: No    
    
09/27/23 08:42    
Occupational Therapy Eval and Treat Routine     
   Reason for consultation: weakness, falls    
   Has provider been notified: No    
Physical Therapy Eval and Treat Routine     
   Reason for consultation: weakness, falls    
   Has provider been notified: No    
    
    
    
Attending physician on discharge: Minnie Hoskins    
    
DS: Diagnosis    
Discharge Diagnosis    
(1) General weakness:     
(2) Falls frequently:     
(3) Diabetic foot ulcer:     
      Qualifiers:    
        Diabetes mellitus type: type 2  Diabetic foot ulcer location: other    
Laterality: unspecified laterality  Non-pressure ulcer stage: unspecified non-  
pressure ulcer stage  Qualified Code(s): E11.621 - Type 2 diabetes mellitus with  
foot ulcer; L97.509 - Non-pressure chronic ulcer of other part of unspecified   
foot with unspecified severity    
(4) Diabetes:     
      Qualifiers:    
        Diabetes mellitus complication detail: with unspecified neuropathy    
Diabetes mellitus complication status: with neurologic complications  Diabetes   
mellitus long term insulin use: with long term use  Diabetes mellitus type: type  
2  Qualified Code(s): E11.40 - Type 2 diabetes mellitus with diabetic   
neuropathy, unspecified; Z79.4 - Long term (current) use of insulin    
(5) Hypertension:     
      Qualifiers:    
        Hypertension type: primary hypertension  Qualified Code(s): I10 -   
Essential (primary) hypertension    
(6) Spinal stenosis:     
      Qualifiers:    
        Spinal region: unspecified  Qualified Code(s): M48.00 - Spinal stenosis,  
site unspecified    
(7) DIVINE treated with BiPAP:     
(8) Blood clot in abdominal vein:     
(9) Unstageable pressure ulcer of left foot:     
(10) Unstageable pressure ulcer of right foot:     
(11) Acute hyperglycemia:     
    
Plan    
(1) General weakness:     
      Assessment and Plan:     
multifactorial, could be from not having BiPAP, to diabetic neuropathy worsening  
to a spinal radiculopathy. Physical therapy occupational therapy consults did   
great with them,  ESR, CRP normal, hemoglobin A1c 6.5. CBC, CMP neck x-rays head  
CT all within normal limits with exception of some hyperglycemia. Infectious   
workup has been negative, no signs of sepsis. Antibiotics D/C, wound consult and  
will follow up with them next week, recommended daily changes.     
(2) Falls frequently:     
      Assessment and Plan:     
see #1    
(3) Diabetic foot ulcer:     
      Assessment and Plan:     
chronic ulcerations of bilateral lower extremities, wound consult, no   
leukocytosis, follow up with wound    
      Qualifiers:    
        Diabetes mellitus type: type 2  Diabetic foot ulcer location: other    
Laterality: unspecified laterality  Non-pressure ulcer stage: unspecified non-  
pressure ulcer stage  Qualified Code(s): E11.621 - Type 2 diabetes mellitus with  
foot ulcer; L97.509 - Non-pressure chronic ulcer of other part of unspecified   
foot with unspecified severity    
(4) Diabetes:     
      Assessment and Plan:     
will continue on home medications, ha1c 6.5    
      Qualifiers:    
        Diabetes mellitus complication detail: with unspecified neuropathy    
Diabetes mellitus complication status: with neurologic complications  Diabetes   
mellitus long term insulin use: with long term use  Diabetes mellitus type: type  
2  Qualified Code(s): E11.40 - Type 2 diabetes mellitus with diabetic   
neuropathy, unspecified; Z79.4 - Long term (current) use of insulin    
(5) Hypertension:     
      Assessment and Plan:     
continue with her medications    
      Qualifiers:    
        Hypertension type: primary hypertension  Qualified Code(s): I10 -   
Essential (primary) hypertension    
(6) Spinal stenosis:     
      Assessment and Plan:     
 x-ray the lumbar spine showed spondylosis, will place on prednisone 40mg DAily   
x 7 days, monitor sugars    
        Spinal region: unspecified  Qualified Code(s): M48.00 - Spinal stenosis,  
site unspecified    
(7) DIVINE treated with BiPAP:     
      Assessment and Plan:     
home supply company to send supples, was given BIPAP last night    
(8) Blood clot in abdominal vein:     
      Assessment and Plan:     
abdominal incision healing nicely, continue lovenox at home    
    
    
DS: Summary    
Hospital Course    
Hospital Course:    
Carotid artery ultrasounds negative. No Leukocytosis. Got patient sat up with   
home bipap supplies, will follow up in wound clinic and for dressing changes,   
overall did well with PT. Normal labs. Ha1c 6.5. head CT negative. Echo still   
pending at the time of discharge, will follow up with PCP who discuss results.   
Stable at the time of discharge, only new medication is prednisone 40mg DAily   
for 7 days, monitor blood sugars while taking steroids. Keep follow up   
appointments.     
Time Spent with Patient    
Time attestation:     
Total time spent providing and/or coordinating discharge services:    
    
Time spent: greater than 30 minutes    
    
Exam    
Narrative    
Exam Narrative:     
General: Patient is alert, and oriented to person, place and time with normal   
affect, proper hygiene     
Skin: 2 black ulcers on the bottom of left forefoot about dime sized, larger 2cm  
x 2cm ulceration with necrotic center on the right forefoot, no discharge or   
drainage, no erythema    
Head: atraumatic, acephalic    
Eyes: PERRLA, no nystagmus present, conjunctiva clear, no scleral icterus    
Ears: normal gross auditory acuity     
Neck: no masses palpated, normal thyroid, no JVD or audible carotid bruits     
Heart: Normal rate and rhythm, no murmurs/rubs/gallops    
Lungs: no audible wheezes, crackles and normal breath sounds all lung fields     
Abdomen: Normal audible bowel sounds, no distension, No palpable masses, no org  
anomegaly, no rebound/guarding/ or rigidity, abdominal incision healing, no   
discharge or drainage    
Musculoskeletal: ROM is limited due to being in hospital bed, no swelling   
bilateral lower extremities but 5/5 strength bilateral lower extremities     
Vascular: Normal carotid, radial, femoral, posterior tibial, and dorsalis pedis   
pulses    
Lymph: no supraclavicular, axillary, or anterior/posterior cervical adenopathy     
Neuro: CN II-X grossly intact, normal sensation upper and lower extremities    
Constitutional    
Vital Signs, click to edit/add:     
                                Last Vital Signs    
    
    
    
Temp  98.0 F   09/28/23 05:13    
     
Pulse  85   09/28/23 05:13    
     
Resp  18   09/28/23 05:13    
     
BP  104/64   09/28/23 05:13    
     
Pulse Ox  95   09/28/23 05:13    
     
O2 Del Method  Room Air  09/28/23 05:13    
     
FiO2  21   09/28/23 05:00    
    
    
    
    
DS: Data    
Data Completed and Pending    
Labs on day of discharge:     
                             Labs from last 24 hours    
    
    
    
  09/28/23 09/27/23 09/27/23    
    
  04:42 20:37 16:05    
     
WBC  5.0      
     
RBC  3.15 L      
     
Hgb  8.9 L      
     
Hct  29.0 L      
     
MCV  92.1      
     
MCH  28.3      
     
MCHC  30.7      
     
RDW  15.4 H      
     
Plt Count  126 L      
     
MPV  10.3      
     
Neut % (Auto)  63.5      
     
Lymph % (Auto)  27.3      
     
Mono % (Auto)  5.4      
     
Eos % (Auto)  2.8      
     
Baso % (Auto)  0.6      
     
Neut # (Auto)  3.2      
     
Lymph # (Auto)  1.4      
     
Mono # (Auto)  0.3      
     
Eos # (Auto)  0.1      
     
Baso # (Auto)  0.0      
     
Abs Immat Gran (auto)  0.02      
     
Imm/Tot Granulo (auto)  0.4      
     
ESR       
     
Sodium  144      
     
Potassium  3.4 L      
     
Chloride  110 H      
     
Carbon Dioxide  28.7      
     
Anion Gap  8.7      
     
BUN  4.0 L      
     
Creatinine  0.59 L      
     
Est GFR ( Amer)  >60      
     
Est GFR (Non-Af Amer)  >60      
     
BUN/Creatinine Ratio  6.8      
     
Glucose  140 H      
     
Estimat Average Glucose       
     
Hemoglobin A1c       
     
Calcium  7.5 L      
     
Total Bilirubin  0.2      
     
AST  23      
     
ALT  17      
     
Alkaline Phosphatase  90      
     
C-Reactive Protein       
     
Total Protein  5.3 L      
     
Albumin  2.0 L      
     
Globulin  3.3      
     
Albumin/Globulin Ratio  0.6      
     
TSH       
     
POC Glucose   186 H  178 H    
    
    
    
    
  09/27/23 09/27/23    
    
  10:44 04:56    
     
WBC      
     
RBC      
     
Hgb      
     
Hct      
     
MCV      
     
MCH      
     
MCHC      
     
RDW      
     
Plt Count      
     
MPV      
     
Neut % (Auto)      
     
Lymph % (Auto)      
     
Mono % (Auto)      
     
Eos % (Auto)      
     
Baso % (Auto)      
     
Neut # (Auto)      
     
Lymph # (Auto)      
     
Mono # (Auto)      
     
Eos # (Auto)      
     
Baso # (Auto)      
     
Abs Immat Gran (auto)      
     
Imm/Tot Granulo (auto)      
     
ESR   16    
     
Sodium      
     
Potassium      
     
Chloride      
     
Carbon Dioxide      
     
Anion Gap      
     
BUN      
     
Creatinine      
     
Est GFR ( Amer)      
     
Est GFR (Non-Af Amer)      
     
BUN/Creatinine Ratio      
     
Glucose      
     
Estimat Average Glucose   140    
     
Hemoglobin A1c   6.5 H    
     
Calcium      
     
Total Bilirubin      
     
AST      
     
ALT      
     
Alkaline Phosphatase      
     
C-Reactive Protein   1.1 H    
     
Total Protein      
     
Albumin      
     
Globulin      
     
Albumin/Globulin Ratio      
     
TSH   0.779    
     
POC Glucose  197 H     
    
    
    
    
    
Discharge Plan    
Discharge    
Disposition: Home, Self-Care    
    
Condition: Fair    
    
Discharge Medications:    
New    
  prednisone 20 mg Tablet     
   40 mg PO QD 7 Days Qty: 7 0RF    
    
Continued    
  amlodipine 10 mg tablet     
   10 mg PO DAILY     
  atorvastatin 40 mg tablet     
   40 mg PO DAILY     
  buspirone 10 mg tablet     
   10 mg PO BID     
  losartan 25 mg tablet     
   25 mg PO DAILY     
  gabapentin 100 mg capsule     
   300 mg PO Q8H     
   Patient Comments:    
   morning, evening and before bedtime    
       
  enoxaparin 100 mg/mL syringe     
   100 mg subcut Q12H     
  venlafaxine 150 mg capsule,extended release 24hr     
   300 mg PO QDAY     
  potassium chloride 20 mEq tablet,ER particles/crystals     
   20 meq PO DAILY     
  insulin lispro 100 unit/mL insulin pen     
   4 unit SUBCUT TIDWM     
  insulin glargine [Lantus Solostar U-100 Insulin] 100 unit/mL (3 mL) insulin   
pen     
   18 unit SUBCUT .qhs     
  omeprazole 40 mg capsule,delayed release(DR/EC)     
   40 mg PO DAILY     
  mirtazapine 30 mg tablet     
   30 mg PO DAILY     
  buspirone 30 mg tablet     
   30 mg PO BID     
    
Activity: other    
    
Activity Detail: ambulate with Cane      
    
    
Diet: advance to your usual diet    
    
Patient Instructions:  Diabetic Foot Ulcers (DC), Weakness (DC)    
    
Forms:  Portal Instructions    
    
Referrals:    
DINH LIN [Primary Care Provider] -     
Bronson Richards DPM [Physician] -     
    
Follow Up Appointments: Dr Dinh Lin 10/6 at 1:00 - [phone] ; [place]   
[facility] 10/6 at 10:30 - [phone]

## 2023-09-28 NOTE — CM.NOTE
Rounds made with jayden Pinto for discharge to home.  Pt will have daily dressing changes, will evaluate pt's knowledge of changing dressing vs ACE for dressing changes.

## 2023-09-28 NOTE — PM.DS1
DS: Providers
Provider
Date of admission: 
09/27/23 00:51

Primary care physician: 
DINH LIN

Admitting clinician: Gpoi Machuca
Consults: 


09/27/23 03:00
Consult to Podiatry Routine 
 Consulting Provider: Bronson Richards
 Reason for consultation: dibaetic foot
 Has provider been notified: No

09/27/23 08:42
Occupational Therapy Eval and Treat Routine 
 Reason for consultation: weakness, falls
 Has provider been notified: No
Physical Therapy Eval and Treat Routine 
 Reason for consultation: weakness, falls
 Has provider been notified: No



Attending physician on discharge: Minnie Hoskins

DS: Diagnosis
Discharge Diagnosis
(1) General weakness: 
(2) Falls frequently: 
(3) Diabetic foot ulcer: 
      Qualifiers:
        Diabetes mellitus type: type 2  Diabetic foot ulcer location: other  Laterality: unspecified laterality  Non-pressure ulcer stage: unspecified non-pressure ulcer stage  Qualified Code(s): E11.621 - Type 2 diabetes mellitus with foot ulcer; 
L97.509 - Non-pressure chronic ulcer of other part of unspecified foot with unspecified severity
(4) Diabetes: 
      Qualifiers:
        Diabetes mellitus complication detail: with unspecified neuropathy  Diabetes mellitus complication status: with neurologic complications  Diabetes mellitus long term insulin use: with long term use  Diabetes mellitus type: type 2  Qualified 
Code(s): E11.40 - Type 2 diabetes mellitus with diabetic neuropathy, unspecified; Z79.4 - Long term (current) use of insulin
(5) Hypertension: 
      Qualifiers:
        Hypertension type: primary hypertension  Qualified Code(s): I10 - Essential (primary) hypertension
(6) Spinal stenosis: 
      Qualifiers:
        Spinal region: unspecified  Qualified Code(s): M48.00 - Spinal stenosis, site unspecified
(7) DIVINE treated with BiPAP: 
(8) Blood clot in abdominal vein: 
(9) Unstageable pressure ulcer of left foot: 
(10) Unstageable pressure ulcer of right foot: 
(11) Acute hyperglycemia: 

Plan
(1) General weakness: 
      Assessment and Plan: 
multifactorial, could be from not having BiPAP, to diabetic neuropathy worsening to a spinal radiculopathy. Physical therapy occupational therapy consults did great with them,  ESR, CRP normal, hemoglobin A1c 6.5. CBC, CMP neck x-rays head CT all 
within normal limits with exception of some hyperglycemia. Infectious workup has been negative, no signs of sepsis. Antibiotics D/C, wound consult and will follow up with them next week, recommended daily changes. 
(2) Falls frequently: 
      Assessment and Plan: 
see #1
(3) Diabetic foot ulcer: 
      Assessment and Plan: 
chronic ulcerations of bilateral lower extremities, wound consult, no leukocytosis, follow up with wound
      Qualifiers:
        Diabetes mellitus type: type 2  Diabetic foot ulcer location: other  Laterality: unspecified laterality  Non-pressure ulcer stage: unspecified non-pressure ulcer stage  Qualified Code(s): E11.621 - Type 2 diabetes mellitus with foot ulcer; 
L97.509 - Non-pressure chronic ulcer of other part of unspecified foot with unspecified severity
(4) Diabetes: 
      Assessment and Plan: 
will continue on home medications, ha1c 6.5
      Qualifiers:
        Diabetes mellitus complication detail: with unspecified neuropathy  Diabetes mellitus complication status: with neurologic complications  Diabetes mellitus long term insulin use: with long term use  Diabetes mellitus type: type 2  Qualified 
Code(s): E11.40 - Type 2 diabetes mellitus with diabetic neuropathy, unspecified; Z79.4 - Long term (current) use of insulin
(5) Hypertension: 
      Assessment and Plan: 
continue with her medications
      Qualifiers:
        Hypertension type: primary hypertension  Qualified Code(s): I10 - Essential (primary) hypertension
(6) Spinal stenosis: 
      Assessment and Plan: 
 x-ray the lumbar spine showed spondylosis, will place on prednisone 40mg DAily x 7 days, monitor sugars
        Spinal region: unspecified  Qualified Code(s): M48.00 - Spinal stenosis, site unspecified
(7) DIVINE treated with BiPAP: 
      Assessment and Plan: 
home supply company to send supples, was given BIPAP last night
(8) Blood clot in abdominal vein: 
      Assessment and Plan: 
abdominal incision healing nicely, continue lovenox at home


DS: Summary
Hospital Course
Hospital Course:
Carotid artery ultrasounds negative. No Leukocytosis. Got patient sat up with home bipap supplies, will follow up in wound clinic and for dressing changes, overall did well with PT. Normal labs. Ha1c 6.5. head CT negative. Echo still pending at the 
time of discharge, will follow up with PCP who discuss results. Stable at the time of discharge, only new medication is prednisone 40mg DAily for 7 days, monitor blood sugars while taking steroids. Keep follow up appointments. 
Time Spent with Patient
Time attestation: 
Total time spent providing and/or coordinating discharge services:

Time spent: greater than 30 minutes

Exam
Narrative
Exam Narrative: 
General: Patient is alert, and oriented to person, place and time with normal affect, proper hygiene 
Skin: 2 black ulcers on the bottom of left forefoot about dime sized, larger 2cm x 2cm ulceration with necrotic center on the right forefoot, no discharge or drainage, no erythema
Head: atraumatic, acephalic
Eyes: PERRLA, no nystagmus present, conjunctiva clear, no scleral icterus
Ears: normal gross auditory acuity 
Neck: no masses palpated, normal thyroid, no JVD or audible carotid bruits 
Heart: Normal rate and rhythm, no murmurs/rubs/gallops
Lungs: no audible wheezes, crackles and normal breath sounds all lung fields 
Abdomen: Normal audible bowel sounds, no distension, No palpable masses, no organomegaly, no rebound/guarding/ or rigidity, abdominal incision healing, no discharge or drainage
Musculoskeletal: ROM is limited due to being in hospital bed, no swelling bilateral lower extremities but 5/5 strength bilateral lower extremities 
Vascular: Normal carotid, radial, femoral, posterior tibial, and dorsalis pedis pulses
Lymph: no supraclavicular, axillary, or anterior/posterior cervical adenopathy 
Neuro: CN II-X grossly intact, normal sensation upper and lower extremities
Constitutional
Vital Signs, click to edit/add: 
Last Vital Signs

Temp  98.0 F   09/28/23 05:13
Pulse  85   09/28/23 05:13
Resp  18   09/28/23 05:13
BP  104/64   09/28/23 05:13
Pulse Ox  95   09/28/23 05:13
O2 Del Method  Room Air  09/28/23 05:13
FiO2  21   09/28/23 05:00



DS: Data
Data Completed and Pending
Labs on day of discharge: 
Labs from last 24 hours

  09/28/23 09/27/23 09/27/23
  04:42 20:37 16:05
WBC  5.0  
RBC  3.15 L  
Hgb  8.9 L  
Hct  29.0 L  
MCV  92.1  
MCH  28.3  
MCHC  30.7  
RDW  15.4 H  
Plt Count  126 L  
MPV  10.3  
Neut % (Auto)  63.5  
Lymph % (Auto)  27.3  
Mono % (Auto)  5.4  
Eos % (Auto)  2.8  
Baso % (Auto)  0.6  
Neut # (Auto)  3.2  
Lymph # (Auto)  1.4  
Mono # (Auto)  0.3  
Eos # (Auto)  0.1  
Baso # (Auto)  0.0  
Abs Immat Gran (auto)  0.02  
Imm/Tot Granulo (auto)  0.4  
ESR   
Sodium  144  
Potassium  3.4 L  
Chloride  110 H  
Carbon Dioxide  28.7  
Anion Gap  8.7  
BUN  4.0 L  
Creatinine  0.59 L  
Est GFR ( Amer)  >60  
Est GFR (Non-Af Amer)  >60  
BUN/Creatinine Ratio  6.8  
Glucose  140 H  
Estimat Average Glucose   
Hemoglobin A1c   
Calcium  7.5 L  
Total Bilirubin  0.2  
AST  23  
ALT  17  
Alkaline Phosphatase  90  
C-Reactive Protein   
Total Protein  5.3 L  
Albumin  2.0 L  
Globulin  3.3  
Albumin/Globulin Ratio  0.6  
TSH   
POC Glucose   186 H  178 H

  09/27/23 09/27/23
  10:44 04:56
WBC  
RBC  
Hgb  
Hct  
MCV  
MCH  
MCHC  
RDW  
Plt Count  
MPV  
Neut % (Auto)  
Lymph % (Auto)  
Mono % (Auto)  
Eos % (Auto)  
Baso % (Auto)  
Neut # (Auto)  
Lymph # (Auto)  
Mono # (Auto)  
Eos # (Auto)  
Baso # (Auto)  
Abs Immat Gran (auto)  
Imm/Tot Granulo (auto)  
ESR   16
Sodium  
Potassium  
Chloride  
Carbon Dioxide  
Anion Gap  
BUN  
Creatinine  
Est GFR ( Amer)  
Est GFR (Non-Af Amer)  
BUN/Creatinine Ratio  
Glucose  
Estimat Average Glucose   140
Hemoglobin A1c   6.5 H
Calcium  
Total Bilirubin  
AST  
ALT  
Alkaline Phosphatase  
C-Reactive Protein   1.1 H
Total Protein  
Albumin  
Globulin  
Albumin/Globulin Ratio  
TSH   0.779
POC Glucose  197 H 




Discharge Plan
Discharge
Disposition: Home, Self-Care

Condition: Fair

Discharge Medications:
New
  prednisone 20 mg Tablet 
   40 mg PO QD 7 Days Qty: 7 0RF

Continued
  amlodipine 10 mg tablet 
   10 mg PO DAILY 
  atorvastatin 40 mg tablet 
   40 mg PO DAILY 
  buspirone 10 mg tablet 
   10 mg PO BID 
  losartan 25 mg tablet 
   25 mg PO DAILY 
  gabapentin 100 mg capsule 
   300 mg PO Q8H 
   Patient Comments:
   morning, evening and before bedtime
   
  enoxaparin 100 mg/mL syringe 
   100 mg subcut Q12H 
  venlafaxine 150 mg capsule,extended release 24hr 
   300 mg PO QDAY 
  potassium chloride 20 mEq tablet,ER particles/crystals 
   20 meq PO DAILY 
  insulin lispro 100 unit/mL insulin pen 
   4 unit SUBCUT TIDWM 
  insulin glargine [Lantus Solostar U-100 Insulin] 100 unit/mL (3 mL) insulin pen 
   18 unit SUBCUT .qhs 
  omeprazole 40 mg capsule,delayed release(DR/EC) 
   40 mg PO DAILY 
  mirtazapine 30 mg tablet 
   30 mg PO DAILY 
  buspirone 30 mg tablet 
   30 mg PO BID 

Activity: other

Activity Detail: ambulate with Cane  


Diet: advance to your usual diet

Patient Instructions:  Diabetic Foot Ulcers (DC), Weakness (DC)

Forms:  Portal Instructions

Referrals:
DINH LIN [Primary Care Provider] - 
Bronson Richards DPM [Physician] - 

Follow Up Appointments: Dr Dinh Lin 10/6 at 1:00 - [phone] ; [facility] 10/6 at 10:30 - [phone]

## 2023-09-30 ENCOUNTER — HOSPITAL ENCOUNTER (OUTPATIENT)
Dept: HOSPITAL 101 - INF | Age: 58
Discharge: HOME | End: 2023-09-30
Payer: COMMERCIAL

## 2023-09-30 VITALS
OXYGEN SATURATION: 96 % | SYSTOLIC BLOOD PRESSURE: 125 MMHG | HEART RATE: 98 BPM | TEMPERATURE: 98.4 F | DIASTOLIC BLOOD PRESSURE: 76 MMHG | RESPIRATION RATE: 16 BRPM

## 2023-09-30 DIAGNOSIS — L97.509: ICD-10-CM

## 2023-09-30 DIAGNOSIS — E11.621: Primary | ICD-10-CM

## 2023-09-30 RX ADMIN — COLLAGENASE SANTYL 1 APPLIC: 250 OINTMENT TOPICAL at 10:33

## 2023-10-01 VITALS
OXYGEN SATURATION: 96 % | RESPIRATION RATE: 16 BRPM | SYSTOLIC BLOOD PRESSURE: 125 MMHG | DIASTOLIC BLOOD PRESSURE: 76 MMHG | TEMPERATURE: 98.4 F | HEART RATE: 98 BPM

## 2023-10-04 VITALS
DIASTOLIC BLOOD PRESSURE: 75 MMHG | HEART RATE: 96 BPM | SYSTOLIC BLOOD PRESSURE: 114 MMHG | RESPIRATION RATE: 18 BRPM | OXYGEN SATURATION: 96 % | TEMPERATURE: 98.8 F

## 2023-10-05 VITALS
OXYGEN SATURATION: 95 % | DIASTOLIC BLOOD PRESSURE: 78 MMHG | HEART RATE: 91 BPM | TEMPERATURE: 98.06 F | SYSTOLIC BLOOD PRESSURE: 126 MMHG | RESPIRATION RATE: 18 BRPM

## 2023-10-05 NOTE — PC.NURSE
Wounds: rt foot: plantar aspect of rt foot near base of great toe, open area, approx 2 cm in diameter. whole center of wound is eschar, noted a moderate amount of foul smelling greenish brown drainage on old dressings and socks. states he has had it 
changed at Dr. Vidal (podiatry) in [place], who is treating the wound. Irrigated with NS, Santyl applied followed by 4x4 and secured with coban
Wound Lt foot: plantar aspect of foot near base of toes, this owund is , no eschar noted, it is approx 1.5 cm in length x 1 cm in width, approx 2-3 mm deep, cleansed with ns covered with DSD, secured with coban.
Instructed on having routine dressing changes. Patient does have upcomming appt with wound center on Friday of this week.

## 2023-10-05 NOTE — PC.NURSE
Wounds: left foot plantar aspect near base of toes. area approx 2x1.5 cm and approx 3-4 mm deep, cleansed with ns and dsd applied.
Wounds: Right foot plantar aspect of foot near base of great toe, area approx 2cm in diameter entire center of wound has eschar, cleansed with ns, santyl applied to eschar, covered with 4x4 secured with coban. Instructed patient on need to keep 
wounds clean and having regular dressing changes. Has appt tomorrow with [facility] wound clinic.

## 2023-10-06 ENCOUNTER — HOSPITAL ENCOUNTER
Dept: HOSPITAL 101 - WC | Age: 58
Discharge: HOME | End: 2023-10-06
Payer: COMMERCIAL

## 2023-10-06 DIAGNOSIS — L97.418: ICD-10-CM

## 2023-10-06 DIAGNOSIS — L97.421: ICD-10-CM

## 2023-10-06 DIAGNOSIS — E11.621: Primary | ICD-10-CM

## 2023-10-06 PROCEDURE — G0463 HOSPITAL OUTPT CLINIC VISIT: HCPCS

## 2023-10-09 ENCOUNTER — HOSPITAL ENCOUNTER (OUTPATIENT)
Dept: HOSPITAL 101 - INF | Age: 58
LOS: 22 days | Discharge: HOME | End: 2023-10-31
Payer: COMMERCIAL

## 2023-10-09 DIAGNOSIS — L97.509: ICD-10-CM

## 2023-10-09 DIAGNOSIS — E11.621: Primary | ICD-10-CM

## 2023-10-09 NOTE — PC.NURSE
1058: [facility]. Pt.  forgot  Santyl cream at home. Relays going to wound clinic appointment on Friday 10/6. Pt. states they told him to  come back over here until his next appointment with Dr. Hoang.  Wound 
clinic phoned per this RN, but  had no information on instructions given to pt. Stated she would inquire to physician and wound nurse and  get back with me . Pt. seated in recliner chair. Sock with large amount serous drainage observed with 
foul odor. Sock removed.  Old dressing on bottom of right foot dangling off of foot. Size of wound approximately size of 50 cent piece with dime sized blackened area to upper portion of wound. Wound irrigated with saline and wrapped with kerlix 
dressing. Clean sock and surgical shoe applied. Instructed pt. to apply Santyl cream upon arriving back home and cover with dressing. Pt. relays understanding.
1108: Pt. d/c'd, amb. to home.

## 2023-10-10 ENCOUNTER — HOSPITAL ENCOUNTER
Dept: HOSPITAL 101 - CARD | Age: 58
Discharge: HOME | End: 2023-10-10
Payer: COMMERCIAL

## 2023-10-10 DIAGNOSIS — R09.89: Primary | ICD-10-CM

## 2023-10-10 DIAGNOSIS — I73.9: ICD-10-CM

## 2023-10-10 PROCEDURE — 93923 UPR/LXTR ART STDY 3+ LVLS: CPT

## 2023-10-10 NOTE — CA_ITS
[facility] 
                                        
                                       Test Date:    2023-10-10 
Pat Name:     NICK BURTON            Department:    
Patient ID:   [id number]               Room:         - 
Gender:       Male                     Technician:    
:          1965               Requested By: CHACORTA GOODWIN 
Order Number: [id number]              Reading MD:   JACQUELINE WOODS 
                           Interpretive Statements 
Biphasic doppler waveforms 
PVR waveforms with normal upstroke and amplitude but loss of dicrotic notch 
Right: 
- no significant pressure gradient between cuffs 
- normal MARIA E 
Left: 
- no significant pressure gradient between cuffs 
- normal MARIA E 
 
Impression: 
- normal arterial evaluation of the lower extremities without hemodynamic 
impairment of the B/L lower extremities at rest (right MARIA E 1.14, left MARIA E 
1.13) 
 
Electronically Signed On 10- 7:18:33 EDT by JACQUELINE WOODS

## 2023-12-18 PROBLEM — I63.519 CEREBROVASCULAR ACCIDENT (CVA) DUE TO OCCLUSION OF MIDDLE CEREBRAL ARTERY (HCC): Status: ACTIVE | Noted: 2023-12-18

## 2023-12-18 PROBLEM — I63.9 STROKE ABORTED BY ADMINISTRATION OF THROMBOLYTIC AGENT (HCC): Status: ACTIVE | Noted: 2023-12-18

## 2023-12-20 PROBLEM — R07.9 CHEST PAIN: Status: ACTIVE | Noted: 2023-12-20

## 2024-01-23 ENCOUNTER — TELEPHONE (OUTPATIENT)
Dept: NEUROLOGY | Age: 59
End: 2024-01-23

## 2024-01-23 NOTE — TELEPHONE ENCOUNTER
01 23 2024 LMOM for patient to reschedule this appointment, no response, cancelled appointment and mailed a letter to the patient. [place]

## 2024-04-23 ENCOUNTER — OFFICE VISIT (OUTPATIENT)
Dept: NEUROLOGY | Age: 59
End: 2024-04-23
Payer: COMMERCIAL

## 2024-04-23 VITALS
HEART RATE: 65 BPM | BODY MASS INDEX: 32.26 KG/M2 | DIASTOLIC BLOOD PRESSURE: 84 MMHG | WEIGHT: 230.4 LBS | SYSTOLIC BLOOD PRESSURE: 139 MMHG | HEIGHT: 71 IN

## 2024-04-23 DIAGNOSIS — R73.9 HYPERGLYCEMIA: ICD-10-CM

## 2024-04-23 DIAGNOSIS — G45.9 TIA (TRANSIENT ISCHEMIC ATTACK): Primary | ICD-10-CM

## 2024-04-23 DIAGNOSIS — E78.5 HYPERLIPIDEMIA, UNSPECIFIED HYPERLIPIDEMIA TYPE: ICD-10-CM

## 2024-04-23 DIAGNOSIS — R53.1 GENERALIZED WEAKNESS: ICD-10-CM

## 2024-04-23 PROCEDURE — 3017F COLORECTAL CA SCREEN DOC REV: CPT | Performed by: PSYCHIATRY & NEUROLOGY

## 2024-04-23 PROCEDURE — G8427 DOCREV CUR MEDS BY ELIG CLIN: HCPCS | Performed by: PSYCHIATRY & NEUROLOGY

## 2024-04-23 PROCEDURE — 99205 OFFICE O/P NEW HI 60 MIN: CPT | Performed by: PSYCHIATRY & NEUROLOGY

## 2024-04-23 PROCEDURE — G8417 CALC BMI ABV UP PARAM F/U: HCPCS | Performed by: PSYCHIATRY & NEUROLOGY

## 2024-04-23 PROCEDURE — 1036F TOBACCO NON-USER: CPT | Performed by: PSYCHIATRY & NEUROLOGY

## 2024-04-23 NOTE — PROGRESS NOTES
[facility] [place]  [address], [address]  [place] [address]  Ph: [phone] or [phone]  FAX: [phone]    Chief Complaint: TIA     Dear Kushal, Gracia CASTANEDA MD     I had the pleasure of seeing your patient today in neurology consultation for his symptoms. As you would recall Sanya Rodriguez is a 58 y.o. 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  NIHSS as was 4 and he received TNK.  He did not have any more facial droop or speech changes.  CTA of the head and neck did not show LVO or high-grade stenosis.  EF was 55 to 60%, Agitated saline study was positive with provocation. Right to left shunt was noted. (His LDL was 59 and A1c was 9.2 in 12/23).  He was started on atorvastatin 40 mg.No aspirin.    He has pain in the lower back which radiates to the upper neck. No weakness on any side but has generalized weakness.    He lives by himself. He is a  ( [employer] ). He is on short term disability right now.    Denies alcohol smoking or drug use.    CTA HEAD and neck 4/12/2024   Unremarkable CTA of the [place]. No high grade arterial stenosis, large vessel occlusion or aneurysm, within confines of venous contamination.     Right carotid system: The right common carotid artery is patent.  Right carotid bifurcation is patent.  There is 0% right internal carotid artery stenosis by NASCET criteria.  Right external carotid artery is patent.     Left carotid system: There is a bovine arch.  The left common carotid artery is patent.  Left carotid bifurcation is patent.  There is 0% left internal carotid artery stenosis by NASCET criteria.  Left external carotid artery is patent.     Bilaterally the vertebral arteries are patent to the skull base.

## 2024-09-09 VITALS — WEIGHT: 230 LBS | HEIGHT: 71 IN | BODY MASS INDEX: 32.2 KG/M2

## 2024-09-09 RX ORDER — PIOGLITAZONEHYDROCHLORIDE 30 MG/1
30 TABLET ORAL DAILY
COMMUNITY
Start: 2024-05-17 | End: 2025-05-17

## 2024-09-17 ENCOUNTER — HOSPITAL ENCOUNTER (OUTPATIENT)
Age: 59
Discharge: HOME | End: 2024-09-17
Attending: ORTHOPAEDIC SURGERY | Admitting: ORTHOPAEDIC SURGERY
Payer: MEDICARE

## 2024-09-17 PROBLEM — M48.02 CERVICAL STENOSIS OF SPINAL CANAL: Status: ACTIVE | Noted: 2024-09-17

## 2024-09-17 PROCEDURE — 1200000000 HC SEMI PRIVATE

## 2024-09-17 RX ORDER — SODIUM CHLORIDE 0.9 % (FLUSH) 0.9 %
5-40 SYRINGE (ML) INJECTION EVERY 12 HOURS SCHEDULED
Status: DISCONTINUED | OUTPATIENT
Start: 2024-09-17 | End: 2024-09-17 | Stop reason: HOSPADM

## 2024-09-17 RX ORDER — SODIUM CHLORIDE 0.9 % (FLUSH) 0.9 %
5-40 SYRINGE (ML) INJECTION PRN
Status: DISCONTINUED | OUTPATIENT
Start: 2024-09-17 | End: 2024-09-17 | Stop reason: HOSPADM

## 2024-09-17 RX ORDER — SODIUM CHLORIDE 9 MG/ML
INJECTION, SOLUTION INTRAVENOUS PRN
Status: DISCONTINUED | OUTPATIENT
Start: 2024-09-17 | End: 2024-09-17 | Stop reason: HOSPADM

## 2024-10-11 ENCOUNTER — APPOINTMENT (OUTPATIENT)
Dept: GENERAL RADIOLOGY | Age: 59
DRG: 472 | End: 2024-10-11
Attending: ORTHOPAEDIC SURGERY
Payer: MEDICARE

## 2024-10-11 ENCOUNTER — ANESTHESIA (OUTPATIENT)
Dept: OPERATING ROOM | Age: 59
End: 2024-10-11
Payer: MEDICARE

## 2024-10-11 ENCOUNTER — HOSPITAL ENCOUNTER (INPATIENT)
Age: 59
LOS: 5 days | Discharge: HOME | DRG: 472 | End: 2024-10-16
Attending: ORTHOPAEDIC SURGERY | Admitting: ORTHOPAEDIC SURGERY
Payer: MEDICARE

## 2024-10-11 ENCOUNTER — ANESTHESIA EVENT (OUTPATIENT)
Dept: OPERATING ROOM | Age: 59
End: 2024-10-11
Payer: MEDICARE

## 2024-10-11 LAB
ABO: NORMAL
ANTIBODY SCREEN: NORMAL
GLUCOSE BLD STRIP.AUTO-MCNC: 149 MG/DL (ref 70–108)
GLUCOSE BLD STRIP.AUTO-MCNC: 168 MG/DL (ref 70–108)
GLUCOSE BLD STRIP.AUTO-MCNC: 326 MG/DL (ref 70–108)
POTASSIUM SERPL-SCNC: 4 MEQ/L (ref 3.5–5.2)
RH FACTOR: NORMAL

## 2024-10-11 PROCEDURE — 72020 X-RAY EXAM OF SPINE 1 VIEW: CPT

## 2024-10-11 PROCEDURE — C1889 IMPLANT/INSERT DEVICE, NOC: HCPCS | Performed by: ORTHOPAEDIC SURGERY

## 2024-10-11 PROCEDURE — 2580000003 HC RX 258: Performed by: PHYSICIAN ASSISTANT

## 2024-10-11 PROCEDURE — C1713 ANCHOR/SCREW BN/BN,TIS/BN: HCPCS | Performed by: ORTHOPAEDIC SURGERY

## 2024-10-11 PROCEDURE — 82948 REAGENT STRIP/BLOOD GLUCOSE: CPT

## 2024-10-11 PROCEDURE — 86850 RBC ANTIBODY SCREEN: CPT

## 2024-10-11 PROCEDURE — 2709999900 HC NON-CHARGEABLE SUPPLY: Performed by: ORTHOPAEDIC SURGERY

## 2024-10-11 PROCEDURE — 1200000000 HC SEMI PRIVATE

## 2024-10-11 PROCEDURE — 6360000002 HC RX W HCPCS: Performed by: PHYSICIAN ASSISTANT

## 2024-10-11 PROCEDURE — 36415 COLL VENOUS BLD VENIPUNCTURE: CPT

## 2024-10-11 PROCEDURE — 84132 ASSAY OF SERUM POTASSIUM: CPT

## 2024-10-11 PROCEDURE — 6360000002 HC RX W HCPCS: Performed by: NURSE ANESTHETIST, CERTIFIED REGISTERED

## 2024-10-11 PROCEDURE — 01N10ZZ RELEASE CERVICAL NERVE, OPEN APPROACH: ICD-10-PCS | Performed by: ORTHOPAEDIC SURGERY

## 2024-10-11 PROCEDURE — 0RT30ZZ RESECTION OF CERVICAL VERTEBRAL DISC, OPEN APPROACH: ICD-10-PCS | Performed by: ORTHOPAEDIC SURGERY

## 2024-10-11 PROCEDURE — 2500000003 HC RX 250 WO HCPCS: Performed by: NURSE ANESTHETIST, CERTIFIED REGISTERED

## 2024-10-11 PROCEDURE — 3600000014 HC SURGERY LEVEL 4 ADDTL 15MIN: Performed by: ORTHOPAEDIC SURGERY

## 2024-10-11 PROCEDURE — 2720000010 HC SURG SUPPLY STERILE: Performed by: ORTHOPAEDIC SURGERY

## 2024-10-11 PROCEDURE — 3700000001 HC ADD 15 MINUTES (ANESTHESIA): Performed by: ORTHOPAEDIC SURGERY

## 2024-10-11 PROCEDURE — 6370000000 HC RX 637 (ALT 250 FOR IP): Performed by: PHYSICIAN ASSISTANT

## 2024-10-11 PROCEDURE — 0RG20K0 FUSION OF 2 OR MORE CERVICAL VERTEBRAL JOINTS WITH NONAUTOLOGOUS TISSUE SUBSTITUTE, ANTERIOR APPROACH, ANTERIOR COLUMN, OPEN APPROACH: ICD-10-PCS | Performed by: ORTHOPAEDIC SURGERY

## 2024-10-11 PROCEDURE — 86901 BLOOD TYPING SEROLOGIC RH(D): CPT

## 2024-10-11 PROCEDURE — 3600000004 HC SURGERY LEVEL 4 BASE: Performed by: ORTHOPAEDIC SURGERY

## 2024-10-11 PROCEDURE — 7100000000 HC PACU RECOVERY - FIRST 15 MIN: Performed by: ORTHOPAEDIC SURGERY

## 2024-10-11 PROCEDURE — 00NW0ZZ RELEASE CERVICAL SPINAL CORD, OPEN APPROACH: ICD-10-PCS | Performed by: ORTHOPAEDIC SURGERY

## 2024-10-11 PROCEDURE — 6360000002 HC RX W HCPCS: Performed by: ANESTHESIOLOGY

## 2024-10-11 PROCEDURE — 86900 BLOOD TYPING SEROLOGIC ABO: CPT

## 2024-10-11 PROCEDURE — 3700000000 HC ANESTHESIA ATTENDED CARE: Performed by: ORTHOPAEDIC SURGERY

## 2024-10-11 PROCEDURE — 7100000001 HC PACU RECOVERY - ADDTL 15 MIN: Performed by: ORTHOPAEDIC SURGERY

## 2024-10-11 DEVICE — IMPLANTABLE DEVICE: Type: IMPLANTABLE DEVICE | Site: NECK | Status: FUNCTIONAL

## 2024-10-11 DEVICE — SCREW SPNL SD 4X16 MM VA FOR ANTR CERV PLATE SYS CERVALIGN: Type: IMPLANTABLE DEVICE | Site: NECK | Status: FUNCTIONAL

## 2024-10-11 DEVICE — SCREW SPNL SD 4X16 MM FIX FOR ANTR CERV PLATE SYS CERVALIGN: Type: IMPLANTABLE DEVICE | Site: NECK | Status: FUNCTIONAL

## 2024-10-11 DEVICE — GRAFT HUM TISS W14XH7MM D11MM CANC CORT LORD SPNL SPCR BLK: Type: IMPLANTABLE DEVICE | Site: NECK | Status: FUNCTIONAL

## 2024-10-11 RX ORDER — ROCURONIUM BROMIDE 10 MG/ML
INJECTION, SOLUTION INTRAVENOUS
Status: DISCONTINUED | OUTPATIENT
Start: 2024-10-11 | End: 2024-10-11 | Stop reason: SDUPTHER

## 2024-10-11 RX ORDER — OXYCODONE HYDROCHLORIDE 5 MG/1
5 TABLET ORAL EVERY 6 HOURS PRN
Status: DISCONTINUED | OUTPATIENT
Start: 2024-10-11 | End: 2024-10-16 | Stop reason: HOSPADM

## 2024-10-11 RX ORDER — PROPOFOL 10 MG/ML
INJECTION, EMULSION INTRAVENOUS
Status: DISCONTINUED | OUTPATIENT
Start: 2024-10-11 | End: 2024-10-11 | Stop reason: SDUPTHER

## 2024-10-11 RX ORDER — GLUCAGON 1 MG/ML
1 KIT INJECTION PRN
Status: DISCONTINUED | OUTPATIENT
Start: 2024-10-11 | End: 2024-10-16 | Stop reason: HOSPADM

## 2024-10-11 RX ORDER — DEXAMETHASONE SODIUM PHOSPHATE 10 MG/ML
INJECTION, EMULSION INTRAMUSCULAR; INTRAVENOUS
Status: DISCONTINUED | OUTPATIENT
Start: 2024-10-11 | End: 2024-10-11 | Stop reason: SDUPTHER

## 2024-10-11 RX ORDER — ACETAMINOPHEN 325 MG/1
650 TABLET ORAL EVERY 6 HOURS PRN
Status: DISCONTINUED | OUTPATIENT
Start: 2024-10-11 | End: 2024-10-16 | Stop reason: HOSPADM

## 2024-10-11 RX ORDER — DEXTROSE MONOHYDRATE 100 MG/ML
INJECTION, SOLUTION INTRAVENOUS CONTINUOUS PRN
Status: DISCONTINUED | OUTPATIENT
Start: 2024-10-11 | End: 2024-10-16 | Stop reason: HOSPADM

## 2024-10-11 RX ORDER — NALOXONE HYDROCHLORIDE 0.4 MG/ML
INJECTION, SOLUTION INTRAMUSCULAR; INTRAVENOUS; SUBCUTANEOUS PRN
Status: DISCONTINUED | OUTPATIENT
Start: 2024-10-11 | End: 2024-10-11 | Stop reason: HOSPADM

## 2024-10-11 RX ORDER — SODIUM CHLORIDE 9 MG/ML
INJECTION, SOLUTION INTRAVENOUS CONTINUOUS
Status: DISCONTINUED | OUTPATIENT
Start: 2024-10-11 | End: 2024-10-16

## 2024-10-11 RX ORDER — SODIUM CHLORIDE 0.9 % (FLUSH) 0.9 %
5-40 SYRINGE (ML) INJECTION EVERY 12 HOURS SCHEDULED
Status: DISCONTINUED | OUTPATIENT
Start: 2024-10-11 | End: 2024-10-16 | Stop reason: HOSPADM

## 2024-10-11 RX ORDER — PIOGLITAZONEHYDROCHLORIDE 30 MG/1
30 TABLET ORAL DAILY
Status: DISCONTINUED | OUTPATIENT
Start: 2024-10-11 | End: 2024-10-16 | Stop reason: HOSPADM

## 2024-10-11 RX ORDER — POLYETHYLENE GLYCOL 3350 17 G/17G
17 POWDER, FOR SOLUTION ORAL DAILY
Status: DISCONTINUED | OUTPATIENT
Start: 2024-10-11 | End: 2024-10-16 | Stop reason: HOSPADM

## 2024-10-11 RX ORDER — SODIUM CHLORIDE 9 MG/ML
INJECTION, SOLUTION INTRAVENOUS PRN
Status: DISCONTINUED | OUTPATIENT
Start: 2024-10-11 | End: 2024-10-11 | Stop reason: HOSPADM

## 2024-10-11 RX ORDER — VENLAFAXINE HYDROCHLORIDE 150 MG/1
150 CAPSULE, EXTENDED RELEASE ORAL DAILY
Status: DISCONTINUED | OUTPATIENT
Start: 2024-10-12 | End: 2024-10-16 | Stop reason: HOSPADM

## 2024-10-11 RX ORDER — SODIUM CHLORIDE 9 MG/ML
INJECTION, SOLUTION INTRAVENOUS PRN
Status: DISCONTINUED | OUTPATIENT
Start: 2024-10-11 | End: 2024-10-16 | Stop reason: HOSPADM

## 2024-10-11 RX ORDER — FENTANYL CITRATE 50 UG/ML
50 INJECTION, SOLUTION INTRAMUSCULAR; INTRAVENOUS EVERY 5 MIN PRN
Status: COMPLETED | OUTPATIENT
Start: 2024-10-11 | End: 2024-10-11

## 2024-10-11 RX ORDER — MORPHINE SULFATE 4 MG/ML
4 INJECTION, SOLUTION INTRAMUSCULAR; INTRAVENOUS
Status: DISPENSED | OUTPATIENT
Start: 2024-10-11 | End: 2024-10-12

## 2024-10-11 RX ORDER — FENTANYL CITRATE 50 UG/ML
INJECTION, SOLUTION INTRAMUSCULAR; INTRAVENOUS
Status: DISCONTINUED | OUTPATIENT
Start: 2024-10-11 | End: 2024-10-11 | Stop reason: SDUPTHER

## 2024-10-11 RX ORDER — SODIUM CHLORIDE 0.9 % (FLUSH) 0.9 %
5-40 SYRINGE (ML) INJECTION EVERY 12 HOURS SCHEDULED
Status: DISCONTINUED | OUTPATIENT
Start: 2024-10-11 | End: 2024-10-11 | Stop reason: HOSPADM

## 2024-10-11 RX ORDER — LIDOCAINE HYDROCHLORIDE 20 MG/ML
INJECTION, SOLUTION INFILTRATION; PERINEURAL
Status: DISCONTINUED | OUTPATIENT
Start: 2024-10-11 | End: 2024-10-11 | Stop reason: SDUPTHER

## 2024-10-11 RX ORDER — AMLODIPINE BESYLATE 10 MG/1
10 TABLET ORAL DAILY
Status: DISCONTINUED | OUTPATIENT
Start: 2024-10-11 | End: 2024-10-16 | Stop reason: HOSPADM

## 2024-10-11 RX ORDER — MIRTAZAPINE 30 MG/1
30 TABLET, FILM COATED ORAL NIGHTLY
Status: DISCONTINUED | OUTPATIENT
Start: 2024-10-11 | End: 2024-10-16 | Stop reason: HOSPADM

## 2024-10-11 RX ORDER — ATORVASTATIN CALCIUM 40 MG/1
40 TABLET, FILM COATED ORAL NIGHTLY
Status: DISCONTINUED | OUTPATIENT
Start: 2024-10-11 | End: 2024-10-16 | Stop reason: HOSPADM

## 2024-10-11 RX ORDER — SODIUM CHLORIDE 0.9 % (FLUSH) 0.9 %
5-40 SYRINGE (ML) INJECTION PRN
Status: DISCONTINUED | OUTPATIENT
Start: 2024-10-11 | End: 2024-10-11 | Stop reason: HOSPADM

## 2024-10-11 RX ORDER — CYCLOBENZAPRINE HCL 10 MG
10 TABLET ORAL 3 TIMES DAILY PRN
Status: DISCONTINUED | OUTPATIENT
Start: 2024-10-11 | End: 2024-10-16 | Stop reason: HOSPADM

## 2024-10-11 RX ORDER — ONDANSETRON 2 MG/ML
INJECTION INTRAMUSCULAR; INTRAVENOUS
Status: DISCONTINUED | OUTPATIENT
Start: 2024-10-11 | End: 2024-10-11 | Stop reason: SDUPTHER

## 2024-10-11 RX ORDER — LOSARTAN POTASSIUM 25 MG/1
25 TABLET ORAL DAILY
Status: DISCONTINUED | OUTPATIENT
Start: 2024-10-12 | End: 2024-10-16 | Stop reason: HOSPADM

## 2024-10-11 RX ORDER — BISACODYL 5 MG/1
5 TABLET, DELAYED RELEASE ORAL DAILY
Status: DISCONTINUED | OUTPATIENT
Start: 2024-10-11 | End: 2024-10-16 | Stop reason: HOSPADM

## 2024-10-11 RX ORDER — ONDANSETRON 4 MG/1
4 TABLET, ORALLY DISINTEGRATING ORAL EVERY 8 HOURS PRN
Status: DISCONTINUED | OUTPATIENT
Start: 2024-10-11 | End: 2024-10-16 | Stop reason: HOSPADM

## 2024-10-11 RX ORDER — OXYCODONE HYDROCHLORIDE 5 MG/1
10 TABLET ORAL EVERY 6 HOURS PRN
Status: DISCONTINUED | OUTPATIENT
Start: 2024-10-11 | End: 2024-10-16 | Stop reason: HOSPADM

## 2024-10-11 RX ORDER — SODIUM CHLORIDE 0.9 % (FLUSH) 0.9 %
5-40 SYRINGE (ML) INJECTION PRN
Status: DISCONTINUED | OUTPATIENT
Start: 2024-10-11 | End: 2024-10-16 | Stop reason: HOSPADM

## 2024-10-11 RX ORDER — SENNA AND DOCUSATE SODIUM 50; 8.6 MG/1; MG/1
1 TABLET, FILM COATED ORAL 2 TIMES DAILY
Status: DISCONTINUED | OUTPATIENT
Start: 2024-10-11 | End: 2024-10-16 | Stop reason: HOSPADM

## 2024-10-11 RX ORDER — INSULIN GLARGINE 100 [IU]/ML
52 INJECTION, SOLUTION SUBCUTANEOUS NIGHTLY
Status: DISCONTINUED | OUTPATIENT
Start: 2024-10-11 | End: 2024-10-16 | Stop reason: HOSPADM

## 2024-10-11 RX ORDER — ALOGLIPTIN 25 MG/1
25 TABLET, FILM COATED ORAL DAILY
Status: DISCONTINUED | OUTPATIENT
Start: 2024-10-11 | End: 2024-10-16 | Stop reason: HOSPADM

## 2024-10-11 RX ORDER — PANTOPRAZOLE SODIUM 40 MG/1
40 TABLET, DELAYED RELEASE ORAL
Status: DISCONTINUED | OUTPATIENT
Start: 2024-10-12 | End: 2024-10-16 | Stop reason: HOSPADM

## 2024-10-11 RX ORDER — ONDANSETRON 2 MG/ML
4 INJECTION INTRAMUSCULAR; INTRAVENOUS EVERY 6 HOURS PRN
Status: DISCONTINUED | OUTPATIENT
Start: 2024-10-11 | End: 2024-10-16 | Stop reason: HOSPADM

## 2024-10-11 RX ORDER — MORPHINE SULFATE 2 MG/ML
2 INJECTION, SOLUTION INTRAMUSCULAR; INTRAVENOUS
Status: ACTIVE | OUTPATIENT
Start: 2024-10-11 | End: 2024-10-12

## 2024-10-11 RX ORDER — GABAPENTIN 300 MG/1
300 CAPSULE ORAL 3 TIMES DAILY
Status: DISCONTINUED | OUTPATIENT
Start: 2024-10-11 | End: 2024-10-16 | Stop reason: HOSPADM

## 2024-10-11 RX ORDER — BISACODYL 10 MG
10 SUPPOSITORY, RECTAL RECTAL DAILY PRN
Status: DISCONTINUED | OUTPATIENT
Start: 2024-10-11 | End: 2024-10-16 | Stop reason: HOSPADM

## 2024-10-11 RX ADMIN — BISACODYL 5 MG: 5 TABLET, COATED ORAL at 18:21

## 2024-10-11 RX ADMIN — PIOGLITAZONE 30 MG: 30 TABLET ORAL at 18:21

## 2024-10-11 RX ADMIN — ONDANSETRON 4 MG: 2 INJECTION INTRAMUSCULAR; INTRAVENOUS at 15:00

## 2024-10-11 RX ADMIN — OXYCODONE 10 MG: 5 TABLET ORAL at 18:46

## 2024-10-11 RX ADMIN — FENTANYL CITRATE 50 MCG: 50 INJECTION, SOLUTION INTRAMUSCULAR; INTRAVENOUS at 16:08

## 2024-10-11 RX ADMIN — INSULIN GLARGINE 52 UNITS: 100 INJECTION, SOLUTION SUBCUTANEOUS at 20:22

## 2024-10-11 RX ADMIN — SODIUM CHLORIDE: 9 INJECTION, SOLUTION INTRAVENOUS at 17:39

## 2024-10-11 RX ADMIN — SENNOSIDES AND DOCUSATE SODIUM 1 TABLET: 50; 8.6 TABLET ORAL at 20:22

## 2024-10-11 RX ADMIN — PROPOFOL 50 MG: 10 INJECTION, EMULSION INTRAVENOUS at 14:12

## 2024-10-11 RX ADMIN — AMLODIPINE BESYLATE 10 MG: 10 TABLET ORAL at 18:21

## 2024-10-11 RX ADMIN — SODIUM CHLORIDE, PRESERVATIVE FREE 10 ML: 5 INJECTION INTRAVENOUS at 20:23

## 2024-10-11 RX ADMIN — HYDROMORPHONE HYDROCHLORIDE 0.5 MG: 1 INJECTION, SOLUTION INTRAMUSCULAR; INTRAVENOUS; SUBCUTANEOUS at 16:13

## 2024-10-11 RX ADMIN — PROPOFOL 50 MG: 10 INJECTION, EMULSION INTRAVENOUS at 14:48

## 2024-10-11 RX ADMIN — FENTANYL CITRATE 50 MCG: 50 INJECTION, SOLUTION INTRAMUSCULAR; INTRAVENOUS at 16:18

## 2024-10-11 RX ADMIN — FENTANYL CITRATE 50 MCG: 50 INJECTION, SOLUTION INTRAMUSCULAR; INTRAVENOUS at 14:04

## 2024-10-11 RX ADMIN — SODIUM CHLORIDE: 9 INJECTION, SOLUTION INTRAVENOUS at 12:24

## 2024-10-11 RX ADMIN — CEFAZOLIN 2000 MG: 2 INJECTION, POWDER, FOR SOLUTION INTRAVENOUS at 23:04

## 2024-10-11 RX ADMIN — HYDROMORPHONE HYDROCHLORIDE 0.5 MG: 1 INJECTION, SOLUTION INTRAMUSCULAR; INTRAVENOUS; SUBCUTANEOUS at 16:23

## 2024-10-11 RX ADMIN — CYCLOBENZAPRINE 10 MG: 10 TABLET, FILM COATED ORAL at 20:22

## 2024-10-11 RX ADMIN — WATER 2000 MG: 1 INJECTION INTRAMUSCULAR; INTRAVENOUS; SUBCUTANEOUS at 14:07

## 2024-10-11 RX ADMIN — GABAPENTIN 300 MG: 300 CAPSULE ORAL at 18:21

## 2024-10-11 RX ADMIN — ATORVASTATIN CALCIUM 40 MG: 40 TABLET, FILM COATED ORAL at 20:22

## 2024-10-11 RX ADMIN — PROPOFOL 150 MG: 10 INJECTION, EMULSION INTRAVENOUS at 14:04

## 2024-10-11 RX ADMIN — MORPHINE SULFATE 4 MG: 4 INJECTION, SOLUTION INTRAMUSCULAR; INTRAVENOUS at 17:41

## 2024-10-11 RX ADMIN — GABAPENTIN 300 MG: 300 CAPSULE ORAL at 20:22

## 2024-10-11 RX ADMIN — MORPHINE SULFATE 4 MG: 4 INJECTION, SOLUTION INTRAMUSCULAR; INTRAVENOUS at 20:22

## 2024-10-11 RX ADMIN — SUGAMMADEX 300 MG: 100 INJECTION, SOLUTION INTRAVENOUS at 15:52

## 2024-10-11 RX ADMIN — DEXAMETHASONE SODIUM PHOSPHATE 5 MG: 10 INJECTION, EMULSION INTRAMUSCULAR; INTRAVENOUS at 14:15

## 2024-10-11 RX ADMIN — MORPHINE SULFATE 4 MG: 4 INJECTION, SOLUTION INTRAMUSCULAR; INTRAVENOUS at 23:04

## 2024-10-11 RX ADMIN — FENTANYL CITRATE 50 MCG: 50 INJECTION, SOLUTION INTRAMUSCULAR; INTRAVENOUS at 14:01

## 2024-10-11 RX ADMIN — ROCURONIUM BROMIDE 20 MG: 10 INJECTION INTRAVENOUS at 14:28

## 2024-10-11 RX ADMIN — FENTANYL CITRATE 50 MCG: 50 INJECTION, SOLUTION INTRAMUSCULAR; INTRAVENOUS at 14:46

## 2024-10-11 RX ADMIN — HYDROMORPHONE HYDROCHLORIDE 0.5 MG: 1 INJECTION, SOLUTION INTRAMUSCULAR; INTRAVENOUS; SUBCUTANEOUS at 16:31

## 2024-10-11 RX ADMIN — FENTANYL CITRATE 50 MCG: 50 INJECTION, SOLUTION INTRAMUSCULAR; INTRAVENOUS at 14:25

## 2024-10-11 RX ADMIN — ALOGLIPTIN 25 MG: 25 TABLET, FILM COATED ORAL at 18:21

## 2024-10-11 RX ADMIN — ROCURONIUM BROMIDE 80 MG: 10 INJECTION INTRAVENOUS at 14:04

## 2024-10-11 RX ADMIN — HYDROMORPHONE HYDROCHLORIDE 0.5 MG: 1 INJECTION, SOLUTION INTRAMUSCULAR; INTRAVENOUS; SUBCUTANEOUS at 16:36

## 2024-10-11 RX ADMIN — MIRTAZAPINE 30 MG: 30 TABLET, FILM COATED ORAL at 20:22

## 2024-10-11 RX ADMIN — LIDOCAINE HYDROCHLORIDE 80 MG: 20 INJECTION, SOLUTION INFILTRATION; PERINEURAL at 14:04

## 2024-10-11 ASSESSMENT — PAIN - FUNCTIONAL ASSESSMENT
PAIN_FUNCTIONAL_ASSESSMENT: 0-10
PAIN_FUNCTIONAL_ASSESSMENT: 0-10
PAIN_FUNCTIONAL_ASSESSMENT: ACTIVITIES ARE NOT PREVENTED
PAIN_FUNCTIONAL_ASSESSMENT: ACTIVITIES ARE NOT PREVENTED

## 2024-10-11 ASSESSMENT — PAIN SCALES - GENERAL
PAINLEVEL_OUTOF10: 7
PAINLEVEL_OUTOF10: 8
PAINLEVEL_OUTOF10: 7
PAINLEVEL_OUTOF10: 8
PAINLEVEL_OUTOF10: 7
PAINLEVEL_OUTOF10: 5
PAINLEVEL_OUTOF10: 7

## 2024-10-11 ASSESSMENT — PAIN DESCRIPTION - DESCRIPTORS
DESCRIPTORS: ACHING;SPASM
DESCRIPTORS: DISCOMFORT

## 2024-10-11 ASSESSMENT — PAIN DESCRIPTION - PAIN TYPE
TYPE: SURGICAL PAIN
TYPE: SURGICAL PAIN

## 2024-10-11 ASSESSMENT — PAIN DESCRIPTION - ORIENTATION
ORIENTATION: UPPER;MID
ORIENTATION: ANTERIOR

## 2024-10-11 ASSESSMENT — PAIN DESCRIPTION - LOCATION
LOCATION: BACK
LOCATION: NECK
LOCATION: NECK;SHOULDER
LOCATION: NECK

## 2024-10-11 ASSESSMENT — PAIN DESCRIPTION - ONSET
ONSET: ON-GOING
ONSET: ON-GOING

## 2024-10-11 ASSESSMENT — PAIN DESCRIPTION - FREQUENCY
FREQUENCY: CONTINUOUS
FREQUENCY: CONTINUOUS

## 2024-10-11 NOTE — ANESTHESIA PRE PROCEDURE
Department of Anesthesiology  Preprocedure Note       Name:  Sanya Rodriguez   Age:  59 y.o.  :  1965                                          MRN:  [id number]         Date:  10/11/2024      Surgeon: Surgeon(s):  Tito Ramires MD    Procedure: Procedure(s):  C3-7 ACDF    Medications prior to admission:   Prior to Admission medications    Medication Sig Start Date End Date Taking? Authorizing Provider   tiZANidine (ZANAFLEX) 4 MG tablet Take 1 tablet by mouth every 6 hours as needed 24  Yes Sydney Gill MD   atorvastatin (LIPITOR) 40 MG tablet Take 1 tablet by mouth nightly 10/3/22  Yes Sydney Gill MD   gabapentin (NEURONTIN) 100 MG capsule Take 3 capsules by mouth in the morning, at noon, and at bedtime. 23  Yes Sydney Gill MD   venlafaxine (EFFEXOR XR) 150 MG extended release capsule Take 1 capsule by mouth daily 9/19/23 10/11/24 Yes Sydney Gill MD   mirtazapine (REMERON) 30 MG tablet Take 1 tablet by mouth nightly 23  Yes Gabi Beal, APRN - CNP   losartan (COZAAR) 25 MG tablet Take 1 tablet by mouth daily 23  Yes Gabi Beal APRN - CNP   insulin glargine (LANTUS SOLOSTAR) 100 UNIT/ML injection pen Inject 18 Units into the skin nightly  Patient taking differently: Inject 52 Units into the skin nightly 23  Yes Gabi Beal, APRN - CNP   pioglitazone (ACTOS) 30 MG tablet Take 1 tablet by mouth daily 24  Sydney Gill MD   SITagliptin (JANUVIA) 100 MG tablet Take 1 tablet by mouth daily 24  Sydney Gill MD   amLODIPine (NORVASC) 10 MG tablet Take 1 tablet by mouth daily 23  Sydney Gill MD   omeprazole (PRILOSEC) 40 MG delayed release capsule Take 1 capsule by mouth daily 23  Sydney Gill MD   glucose 4 g chewable tablet Take 4 tablets by mouth as needed for Low blood sugar 23   Gabi Bael, APRN - CNP   insulin lispro, 1 Unit Dial, (HUMALOG

## 2024-10-11 NOTE — PLAN OF CARE
Problem: Chronic Conditions and Co-morbidities  Goal: Patient's chronic conditions and co-morbidity symptoms are monitored and maintained or improved  Outcome: Progressing  Flowsheets  Taken 10/11/2024 1846 by Jo-Ann Rivera RN  Care Plan - Patient's Chronic Conditions and Co-Morbidity Symptoms are Monitored and Maintained or Improved: Monitor and assess patient's chronic conditions and comorbid symptoms for stability, deterioration, or improvement  Taken 10/11/2024 1204 by Rosa Maria Boyd RN  Care Plan - Patient's Chronic Conditions and Co-Morbidity Symptoms are Monitored and Maintained or Improved: Collaborate with multidisciplinary team to address chronic and comorbid conditions and prevent exacerbation or deterioration     Problem: Discharge Planning  Goal: Discharge to home or other facility with appropriate resources  Outcome: Progressing  Flowsheets  Taken 10/11/2024 1846 by Jo-Ann Rivera RN  Discharge to home or other facility with appropriate resources: Identify barriers to discharge with patient and caregiver  Taken 10/11/2024 1204 by Rosa Maria Boyd RN  Discharge to home or other facility with appropriate resources: Identify barriers to discharge with patient and caregiver     Problem: Pain  Goal: Verbalizes/displays adequate comfort level or baseline comfort level  Outcome: Progressing  Flowsheets (Taken 10/11/2024 1846)  Verbalizes/displays adequate comfort level or baseline comfort level: Assess pain using appropriate pain scale     Problem: ABCDS Injury Assessment  Goal: Absence of physical injury  Outcome: Progressing  Flowsheets (Taken 10/11/2024 1846)  Absence of Physical Injury: Implement safety measures based on patient assessment     Problem: Safety - Adult  Goal: Free from fall injury  Outcome: Progressing  Flowsheets (Taken 10/11/2024 1846)  Free From Fall Injury: Instruct family/caregiver on patient safety   Care plan reviewed with patient.  Patient verbalize understanding of

## 2024-10-11 NOTE — ANESTHESIA POSTPROCEDURE EVALUATION
Department of Anesthesiology  Postprocedure Note    Patient: Sanya Rodriguez  MRN: [id number]  YOB: 1965  Date of evaluation: 10/11/2024    Procedure Summary       Date: 10/11/24 Room / Location: [facility] OR  / [facility] OR    Anesthesia Start: 1401 Anesthesia Stop: 1609    Procedure: C3-7 ACDF (Neck) Diagnosis:       Cervical stenosis of spine      (Cervical stenosis of spine [M48.02])    Surgeons: Tito Ramires MD Responsible Provider: Brad Joseph DO    Anesthesia Type: general ASA Status: 3            Anesthesia Type: No value filed.    Sai Phase I: Sai Score: 9    Sai Phase II:      Anesthesia Post Evaluation    Patient location during evaluation: PACU  Patient participation: complete - patient participated  Level of consciousness: awake and alert  Airway patency: patent  Nausea & Vomiting: no nausea and no vomiting  Cardiovascular status: hemodynamically stable  Respiratory status: acceptable  Hydration status: euvolemic  Pain management: adequate    [facility]  POST-ANESTHESIA NOTE       Name:  Sanya Rodriguez                                         Age:  59 y.o.  MRN:  [id number]      Last Vitals:  BP (!) 155/75   Pulse 86   Temp 96.8 °F (36 °C) (Temporal)   Resp 16   Ht 1.803 m (5' 11\")   Wt 106.8 kg (235 lb 6.4 oz)   SpO2 93%   BMI 32.83 kg/m²   Patient Vitals for the past 4 hrs:   BP Temp Temp src Pulse Resp SpO2   10/11/24 1710 (!) 155/75 -- -- 86 16 93 %   10/11/24 1705 (!) 156/77 -- -- 84 10 95 %   10/11/24 1700 139/69 -- -- 86 12 91 %   10/11/24 1656 -- -- -- 84 -- --   10/11/24 1655 (!) 152/74 -- -- 85 11 94 %   10/11/24 1650 (!) 169/84 -- -- 83 12 95 %   10/11/24 1645 (!) 160/82 -- -- 83 17 94 %   10/11/24 1640 (!) 174/84 -- -- 81 16 95 %   10/11/24 1635 (!) 172/81 -- -- 80 12 93 %   10/11/24 1630 (!) 149/84 -- -- 80 11 97 %   10/11/24 1625 (!) 152/77 -- -- 81 18 95 %   10/11/24 1620 (!) 156/80 -- -- 82 16 96 %   10/11/24 1615 (!) 159/86 -- -- 83 17 95 %

## 2024-10-11 NOTE — DISCHARGE INSTRUCTIONS
Wound Care:  -Do not change dressing or shower until the drain has been removed.   -Once drain is removed, start dressing changes once daily until incision is clean and dry, then okay to leave open to air.   -Do not submerge incision in water. Avoid hot-tubs and pools  -Allow steri strips to fall off with time  -Okay to shower after drain is removed but try and keep incision as dry as possible.     Restrictions:  -Limit bending and twisting  -No lifting over 10-15 lbs  -Walk as much as tolerated, take short frequent walks throughout the day and try to take one long walk a day (start at 5 minutes and increase as tolerated)  -Wear neck brace for a total of two weeks after surgery while walking. Okay to remove when laying down or sitting (Recommend keeping neck brace on at all times while drain is in place to avoid accidentally pulling on the drain)    Medications:  -A pain medication (Tramadol) and muscle relaxer (Flexeril) will be sent to your pharmacy. Take medications as directed  -Pain medications can cause postoperative constipation. Take an over the counter stool softener while taking the pain medication  -Okay to resume vitamins and supplements one week after surgery.  -Hold all NSAIDs for 6 months following surgery (i.e. Ibuprofen, Aleve, motrin, etc).  -Okay to resume aspirin 72 hours after surgery and all other blood thinners five days after surgery unless otherwise directed by physician    Postoperative appointment:  -Follow up in the office 6 weeks following surgery as scheduled.   [phone] ext [phone]     If any concerning symptoms, such as calf pain/swelling, new numbness/tingling or pain please the contact the office. If concerning symptoms including chest pain or difficulty breathing, go to the Emergency Department.

## 2024-10-11 NOTE — H&P
[facility]  History and Physical Update    Pt Name: Sanya Rodriguez  MRN: [id number]  YOB: 1965  Date of evaluation: 10/11/2024    I have examined the patient and reviewed the H&P/Consult and there are no changes to the patient or plans.      Tito Ramires MD  Electronically signed 10/11/2024 at 1:58 PM

## 2024-10-11 NOTE — OP NOTE
permanent speech and swallowing disturbances, DVT/PE, stroke, MI, death etc. All questions were answered and informed consent was obtained.     OPERATIVE PROCEDURE:  The patient was taken to the operating room by Anesthesiology Service and had satisfactory general anesthesia.  A first-generation cephalosporin was given within 1 hour of surgical incision, 2 g of cefazolin was given IV.  Venous thromboembolic prophylaxis was performed with sequential devices.  The patient was then positioned supine on a standard OR table, occiput in a doughnut. Neck extended well within the means of what can be tolerated neurologically. The anterior neck was then prepped and draped entirely in the usual sterile fashion.     Before incision, a formal time-out was taken per protocol.  We next took a left-sided Caro-Napier approach to the anterior cervical spine.  A vertical incision was made in line with the skin crease. The platysma was divided in line with this incision.  Blunt dissection was then proceeded medial to the sternocleidomastoid and carotid sheath. The omohyoid was divided. The carotid artery was palpated and retracted laterally.  The anterior cervical spine was visualized and a localization needle was placed in the disc space and intraoperative radiographic localization of level was confirmed.  We then elevated the longus colli from C3 through C7.     Satisfied with the exposure and confirmation of level, we placed the self-retaining retractor at C3-C4. We then began complete diskectomy with a variety of curettes from uncus to uncus. Bilateral endplate decortications were then performed with a high-speed fernando going back to the PLL.  The PLL was then resected, incised in the midline going to neural foramina bilaterally.  This was done until we could see the exiting portion of the C4 nerve roots. This thereby totally decompressed the neural elements.  Satisfied with this, we then achieved hemostasis and sized the

## 2024-10-12 LAB
ANION GAP SERPL CALC-SCNC: 14 MEQ/L (ref 8–16)
BUN SERPL-MCNC: 13 MG/DL (ref 7–22)
CALCIUM SERPL-MCNC: 8.3 MG/DL (ref 8.5–10.5)
CHLORIDE SERPL-SCNC: 103 MEQ/L (ref 98–111)
CO2 SERPL-SCNC: 22 MEQ/L (ref 23–33)
CREAT SERPL-MCNC: 0.9 MG/DL (ref 0.4–1.2)
GFR SERPL CREATININE-BSD FRML MDRD: > 90 ML/MIN/1.73M2
GLUCOSE BLD STRIP.AUTO-MCNC: 145 MG/DL (ref 70–108)
GLUCOSE BLD STRIP.AUTO-MCNC: 185 MG/DL (ref 70–108)
GLUCOSE BLD STRIP.AUTO-MCNC: 231 MG/DL (ref 70–108)
GLUCOSE SERPL-MCNC: 192 MG/DL (ref 70–108)
HCT VFR BLD AUTO: 37.1 % (ref 42–52)
HGB BLD-MCNC: 12.2 GM/DL (ref 14–18)
POTASSIUM SERPL-SCNC: 4.1 MEQ/L (ref 3.5–5.2)
SODIUM SERPL-SCNC: 139 MEQ/L (ref 135–145)

## 2024-10-12 PROCEDURE — 97116 GAIT TRAINING THERAPY: CPT

## 2024-10-12 PROCEDURE — 85014 HEMATOCRIT: CPT

## 2024-10-12 PROCEDURE — 2580000003 HC RX 258: Performed by: PHYSICIAN ASSISTANT

## 2024-10-12 PROCEDURE — 97166 OT EVAL MOD COMPLEX 45 MIN: CPT

## 2024-10-12 PROCEDURE — 6370000000 HC RX 637 (ALT 250 FOR IP): Performed by: PHYSICIAN ASSISTANT

## 2024-10-12 PROCEDURE — 97535 SELF CARE MNGMENT TRAINING: CPT

## 2024-10-12 PROCEDURE — 1200000000 HC SEMI PRIVATE

## 2024-10-12 PROCEDURE — 92610 EVALUATE SWALLOWING FUNCTION: CPT

## 2024-10-12 PROCEDURE — 97162 PT EVAL MOD COMPLEX 30 MIN: CPT

## 2024-10-12 PROCEDURE — 6370000000 HC RX 637 (ALT 250 FOR IP): Performed by: INTERNAL MEDICINE

## 2024-10-12 PROCEDURE — 85018 HEMOGLOBIN: CPT

## 2024-10-12 PROCEDURE — 82948 REAGENT STRIP/BLOOD GLUCOSE: CPT

## 2024-10-12 PROCEDURE — 6360000002 HC RX W HCPCS: Performed by: PHYSICIAN ASSISTANT

## 2024-10-12 PROCEDURE — 36415 COLL VENOUS BLD VENIPUNCTURE: CPT

## 2024-10-12 PROCEDURE — 80048 BASIC METABOLIC PNL TOTAL CA: CPT

## 2024-10-12 RX ORDER — INSULIN LISPRO 100 [IU]/ML
8 INJECTION, SOLUTION INTRAVENOUS; SUBCUTANEOUS
Status: DISCONTINUED | OUTPATIENT
Start: 2024-10-12 | End: 2024-10-16 | Stop reason: HOSPADM

## 2024-10-12 RX ORDER — INSULIN LISPRO 100 [IU]/ML
8 INJECTION, SOLUTION INTRAVENOUS; SUBCUTANEOUS
Status: DISCONTINUED | OUTPATIENT
Start: 2024-10-12 | End: 2024-10-12 | Stop reason: SDUPTHER

## 2024-10-12 RX ORDER — INSULIN LISPRO 100 [IU]/ML
0-4 INJECTION, SOLUTION INTRAVENOUS; SUBCUTANEOUS
Status: DISCONTINUED | OUTPATIENT
Start: 2024-10-12 | End: 2024-10-16 | Stop reason: HOSPADM

## 2024-10-12 RX ADMIN — SODIUM CHLORIDE, PRESERVATIVE FREE 10 ML: 5 INJECTION INTRAVENOUS at 08:47

## 2024-10-12 RX ADMIN — SODIUM CHLORIDE, PRESERVATIVE FREE 5 ML: 5 INJECTION INTRAVENOUS at 20:26

## 2024-10-12 RX ADMIN — ALOGLIPTIN 25 MG: 25 TABLET, FILM COATED ORAL at 08:46

## 2024-10-12 RX ADMIN — PIOGLITAZONE 30 MG: 30 TABLET ORAL at 08:46

## 2024-10-12 RX ADMIN — AMLODIPINE BESYLATE 10 MG: 10 TABLET ORAL at 08:46

## 2024-10-12 RX ADMIN — INSULIN LISPRO 1 UNITS: 100 INJECTION, SOLUTION INTRAVENOUS; SUBCUTANEOUS at 20:19

## 2024-10-12 RX ADMIN — ATORVASTATIN CALCIUM 40 MG: 40 TABLET, FILM COATED ORAL at 20:19

## 2024-10-12 RX ADMIN — CEFAZOLIN 2000 MG: 2 INJECTION, POWDER, FOR SOLUTION INTRAVENOUS at 06:04

## 2024-10-12 RX ADMIN — GABAPENTIN 300 MG: 300 CAPSULE ORAL at 16:14

## 2024-10-12 RX ADMIN — GABAPENTIN 300 MG: 300 CAPSULE ORAL at 20:19

## 2024-10-12 RX ADMIN — SENNOSIDES AND DOCUSATE SODIUM 1 TABLET: 50; 8.6 TABLET ORAL at 20:19

## 2024-10-12 RX ADMIN — GABAPENTIN 300 MG: 300 CAPSULE ORAL at 08:46

## 2024-10-12 RX ADMIN — LOSARTAN POTASSIUM 25 MG: 25 TABLET, FILM COATED ORAL at 08:46

## 2024-10-12 RX ADMIN — BISACODYL 5 MG: 5 TABLET, COATED ORAL at 08:46

## 2024-10-12 RX ADMIN — VENLAFAXINE HYDROCHLORIDE 150 MG: 150 CAPSULE, EXTENDED RELEASE ORAL at 08:46

## 2024-10-12 RX ADMIN — OXYCODONE 10 MG: 5 TABLET ORAL at 20:19

## 2024-10-12 RX ADMIN — CYCLOBENZAPRINE 10 MG: 10 TABLET, FILM COATED ORAL at 20:19

## 2024-10-12 RX ADMIN — POLYETHYLENE GLYCOL 3350 17 G: 17 POWDER, FOR SOLUTION ORAL at 08:46

## 2024-10-12 RX ADMIN — MORPHINE SULFATE 4 MG: 4 INJECTION, SOLUTION INTRAMUSCULAR; INTRAVENOUS at 16:14

## 2024-10-12 RX ADMIN — INSULIN LISPRO 8 UNITS: 100 INJECTION, SOLUTION INTRAVENOUS; SUBCUTANEOUS at 16:14

## 2024-10-12 RX ADMIN — OXYCODONE 10 MG: 5 TABLET ORAL at 03:50

## 2024-10-12 RX ADMIN — SENNOSIDES AND DOCUSATE SODIUM 1 TABLET: 50; 8.6 TABLET ORAL at 08:46

## 2024-10-12 RX ADMIN — INSULIN LISPRO 8 UNITS: 100 INJECTION, SOLUTION INTRAVENOUS; SUBCUTANEOUS at 12:49

## 2024-10-12 RX ADMIN — INSULIN GLARGINE 52 UNITS: 100 INJECTION, SOLUTION SUBCUTANEOUS at 20:19

## 2024-10-12 RX ADMIN — OXYCODONE 10 MG: 5 TABLET ORAL at 10:44

## 2024-10-12 RX ADMIN — CYCLOBENZAPRINE 10 MG: 10 TABLET, FILM COATED ORAL at 03:51

## 2024-10-12 RX ADMIN — PANTOPRAZOLE SODIUM 40 MG: 40 TABLET, DELAYED RELEASE ORAL at 06:04

## 2024-10-12 RX ADMIN — MORPHINE SULFATE 4 MG: 4 INJECTION, SOLUTION INTRAMUSCULAR; INTRAVENOUS at 08:47

## 2024-10-12 RX ADMIN — MIRTAZAPINE 30 MG: 30 TABLET, FILM COATED ORAL at 20:19

## 2024-10-12 ASSESSMENT — PAIN SCALES - GENERAL
PAINLEVEL_OUTOF10: 5
PAINLEVEL_OUTOF10: 8
PAINLEVEL_OUTOF10: 5
PAINLEVEL_OUTOF10: 5
PAINLEVEL_OUTOF10: 6
PAINLEVEL_OUTOF10: 7
PAINLEVEL_OUTOF10: 7
PAINLEVEL_OUTOF10: 8
PAINLEVEL_OUTOF10: 7

## 2024-10-12 ASSESSMENT — PAIN DESCRIPTION - PAIN TYPE
TYPE: SURGICAL PAIN

## 2024-10-12 ASSESSMENT — PAIN DESCRIPTION - FREQUENCY
FREQUENCY: CONTINUOUS

## 2024-10-12 ASSESSMENT — PAIN - FUNCTIONAL ASSESSMENT
PAIN_FUNCTIONAL_ASSESSMENT: ACTIVITIES ARE NOT PREVENTED

## 2024-10-12 ASSESSMENT — PAIN DESCRIPTION - DESCRIPTORS
DESCRIPTORS: SORE;TIGHTNESS
DESCRIPTORS: ACHING;SORE
DESCRIPTORS: ACHING
DESCRIPTORS: ACHING

## 2024-10-12 ASSESSMENT — PAIN DESCRIPTION - LOCATION
LOCATION: NECK;BACK
LOCATION: NECK;SHOULDER
LOCATION: NECK
LOCATION: NECK
LOCATION: BACK

## 2024-10-12 ASSESSMENT — PAIN DESCRIPTION - ONSET
ONSET: ON-GOING
ONSET: ON-GOING

## 2024-10-12 ASSESSMENT — PAIN DESCRIPTION - ORIENTATION
ORIENTATION: POSTERIOR;UPPER
ORIENTATION: POSTERIOR
ORIENTATION: LOWER;MID

## 2024-10-12 NOTE — PLAN OF CARE
Problem: Chronic Conditions and Co-morbidities  Goal: Patient's chronic conditions and co-morbidity symptoms are monitored and maintained or improved  Outcome: Progressing  Flowsheets (Taken 10/12/2024 0838)  Care Plan - Patient's Chronic Conditions and Co-Morbidity Symptoms are Monitored and Maintained or Improved: Monitor and assess patient's chronic conditions and comorbid symptoms for stability, deterioration, or improvement     Problem: Discharge Planning  Goal: Discharge to home or other facility with appropriate resources  Outcome: Progressing  Flowsheets (Taken 10/12/2024 0838)  Discharge to home or other facility with appropriate resources: Identify barriers to discharge with patient and caregiver     Problem: Pain  Goal: Verbalizes/displays adequate comfort level or baseline comfort level  Outcome: Progressing     Problem: ABCDS Injury Assessment  Goal: Absence of physical injury  Outcome: Progressing     Problem: Safety - Adult  Goal: Free from fall injury  Outcome: Progressing

## 2024-10-12 NOTE — CONSULTS
Hospital Medicine Consult    Patient:  Sanya Rodriguez  MRN: [id number]    Referring Physician: Dr Simental  Reason for Consult: post op medical management  [facility] Care Physician: Gracia Fatima MD    HISTORY OF PRESENT ILLNESS:   The patient is a 59 y.o. male with chronic radicular neck pain from cervical spinal stenosis, admitted for cervical spine decompression surgery. Doing well post op. Improved extremity numbness. No arms or legs weakness. No CP, no SOB.  H/o DM 2, on Insulin, h/o JOSEPHINE, on CPAP.    Past Medical History:        Diagnosis Date    Arthritis     Cellulitis     CHF (congestive heart failure) (HCC)     Depression     Diabetes mellitus (HCC)     Hypertension     Sleep apnea     uses a cpap    TIA (transient ischemic attack)        Past Surgical History:        Procedure Laterality Date    COLON SURGERY  2023    for ischemia       Medications: Scheduled Meds:   amLODIPine  10 mg Oral Daily    atorvastatin  40 mg Oral Nightly    gabapentin  300 mg Oral TID    insulin glargine  52 Units SubCUTAneous Nightly    losartan  25 mg Oral Daily    mirtazapine  30 mg Oral Nightly    pantoprazole  40 mg Oral QAM AC    pioglitazone  30 mg Oral Daily    alogliptin  25 mg Oral Daily    venlafaxine  150 mg Oral Daily    sodium chloride flush  5-40 mL IntraVENous 2 times per day    polyethylene glycol  17 g Oral Daily    bisacodyl  5 mg Oral Daily    sennosides-docusate sodium  1 tablet Oral BID     Continuous Infusions:   sodium chloride 125 mL/hr at 10/11/24 1739    sodium chloride      dextrose       PRN Meds:.sodium chloride flush, sodium chloride, acetaminophen, oxyCODONE **OR** oxyCODONE, morphine **OR** morphine, ondansetron **OR** ondansetron, magnesium hydroxide, bisacodyl, cyclobenzaprine, phenol, glucose, dextrose bolus **OR** dextrose bolus, glucagon (rDNA), dextrose    Allergies:  Patient has no known allergies.    Social History:   TOBACCO:   reports that he has never smoked. He has never used smokeless

## 2024-10-13 LAB
ANION GAP SERPL CALC-SCNC: 9 MEQ/L (ref 8–16)
BUN SERPL-MCNC: 14 MG/DL (ref 7–22)
CALCIUM SERPL-MCNC: 8.2 MG/DL (ref 8.5–10.5)
CHLORIDE SERPL-SCNC: 103 MEQ/L (ref 98–111)
CO2 SERPL-SCNC: 29 MEQ/L (ref 23–33)
CREAT SERPL-MCNC: 0.8 MG/DL (ref 0.4–1.2)
GFR SERPL CREATININE-BSD FRML MDRD: > 90 ML/MIN/1.73M2
GLUCOSE BLD STRIP.AUTO-MCNC: 118 MG/DL (ref 70–108)
GLUCOSE BLD STRIP.AUTO-MCNC: 142 MG/DL (ref 70–108)
GLUCOSE BLD STRIP.AUTO-MCNC: 143 MG/DL (ref 70–108)
GLUCOSE BLD STRIP.AUTO-MCNC: 150 MG/DL (ref 70–108)
GLUCOSE BLD STRIP.AUTO-MCNC: 178 MG/DL (ref 70–108)
GLUCOSE SERPL-MCNC: 147 MG/DL (ref 70–108)
HCT VFR BLD AUTO: 36.2 % (ref 42–52)
HGB BLD-MCNC: 11.5 GM/DL (ref 14–18)
POTASSIUM SERPL-SCNC: 3.9 MEQ/L (ref 3.5–5.2)
SODIUM SERPL-SCNC: 141 MEQ/L (ref 135–145)

## 2024-10-13 PROCEDURE — 6370000000 HC RX 637 (ALT 250 FOR IP): Performed by: PHYSICIAN ASSISTANT

## 2024-10-13 PROCEDURE — 82948 REAGENT STRIP/BLOOD GLUCOSE: CPT

## 2024-10-13 PROCEDURE — 1200000000 HC SEMI PRIVATE

## 2024-10-13 PROCEDURE — 85018 HEMOGLOBIN: CPT

## 2024-10-13 PROCEDURE — 80048 BASIC METABOLIC PNL TOTAL CA: CPT

## 2024-10-13 PROCEDURE — 85014 HEMATOCRIT: CPT

## 2024-10-13 PROCEDURE — 6370000000 HC RX 637 (ALT 250 FOR IP): Performed by: INTERNAL MEDICINE

## 2024-10-13 PROCEDURE — 36415 COLL VENOUS BLD VENIPUNCTURE: CPT

## 2024-10-13 PROCEDURE — 2580000003 HC RX 258: Performed by: PHYSICIAN ASSISTANT

## 2024-10-13 RX ADMIN — ATORVASTATIN CALCIUM 40 MG: 40 TABLET, FILM COATED ORAL at 19:40

## 2024-10-13 RX ADMIN — BISACODYL 5 MG: 5 TABLET, COATED ORAL at 08:47

## 2024-10-13 RX ADMIN — SENNOSIDES AND DOCUSATE SODIUM 1 TABLET: 50; 8.6 TABLET ORAL at 08:47

## 2024-10-13 RX ADMIN — LOSARTAN POTASSIUM 25 MG: 25 TABLET, FILM COATED ORAL at 08:47

## 2024-10-13 RX ADMIN — PANTOPRAZOLE SODIUM 40 MG: 40 TABLET, DELAYED RELEASE ORAL at 04:18

## 2024-10-13 RX ADMIN — POLYETHYLENE GLYCOL 3350 17 G: 17 POWDER, FOR SOLUTION ORAL at 08:47

## 2024-10-13 RX ADMIN — ALOGLIPTIN 25 MG: 25 TABLET, FILM COATED ORAL at 08:47

## 2024-10-13 RX ADMIN — CYCLOBENZAPRINE 10 MG: 10 TABLET, FILM COATED ORAL at 04:16

## 2024-10-13 RX ADMIN — ACETAMINOPHEN 650 MG: 325 TABLET ORAL at 15:20

## 2024-10-13 RX ADMIN — OXYCODONE 10 MG: 5 TABLET ORAL at 19:40

## 2024-10-13 RX ADMIN — VENLAFAXINE HYDROCHLORIDE 150 MG: 150 CAPSULE, EXTENDED RELEASE ORAL at 08:47

## 2024-10-13 RX ADMIN — PIOGLITAZONE 30 MG: 30 TABLET ORAL at 08:47

## 2024-10-13 RX ADMIN — GABAPENTIN 300 MG: 300 CAPSULE ORAL at 19:40

## 2024-10-13 RX ADMIN — ACETAMINOPHEN 650 MG: 325 TABLET ORAL at 08:40

## 2024-10-13 RX ADMIN — SENNOSIDES AND DOCUSATE SODIUM 1 TABLET: 50; 8.6 TABLET ORAL at 19:40

## 2024-10-13 RX ADMIN — MIRTAZAPINE 30 MG: 30 TABLET, FILM COATED ORAL at 19:40

## 2024-10-13 RX ADMIN — OXYCODONE 10 MG: 5 TABLET ORAL at 13:10

## 2024-10-13 RX ADMIN — OXYCODONE 10 MG: 5 TABLET ORAL at 04:16

## 2024-10-13 RX ADMIN — AMLODIPINE BESYLATE 10 MG: 10 TABLET ORAL at 08:47

## 2024-10-13 RX ADMIN — INSULIN GLARGINE 52 UNITS: 100 INJECTION, SOLUTION SUBCUTANEOUS at 19:41

## 2024-10-13 RX ADMIN — GABAPENTIN 300 MG: 300 CAPSULE ORAL at 15:20

## 2024-10-13 RX ADMIN — GABAPENTIN 300 MG: 300 CAPSULE ORAL at 08:47

## 2024-10-13 RX ADMIN — INSULIN LISPRO 8 UNITS: 100 INJECTION, SOLUTION INTRAVENOUS; SUBCUTANEOUS at 08:47

## 2024-10-13 ASSESSMENT — PAIN DESCRIPTION - DESCRIPTORS
DESCRIPTORS: ACHING
DESCRIPTORS: ACHING
DESCRIPTORS: SORE
DESCRIPTORS: ACHING

## 2024-10-13 ASSESSMENT — PAIN DESCRIPTION - LOCATION
LOCATION: NECK
LOCATION: NECK;BACK

## 2024-10-13 ASSESSMENT — PAIN SCALES - GENERAL
PAINLEVEL_OUTOF10: 0
PAINLEVEL_OUTOF10: 3
PAINLEVEL_OUTOF10: 8
PAINLEVEL_OUTOF10: 8
PAINLEVEL_OUTOF10: 7
PAINLEVEL_OUTOF10: 7
PAINLEVEL_OUTOF10: 3
PAINLEVEL_OUTOF10: 8

## 2024-10-13 ASSESSMENT — PAIN DESCRIPTION - ORIENTATION
ORIENTATION: POSTERIOR
ORIENTATION: ANTERIOR
ORIENTATION: ANTERIOR
ORIENTATION: POSTERIOR

## 2024-10-13 ASSESSMENT — PAIN - FUNCTIONAL ASSESSMENT: PAIN_FUNCTIONAL_ASSESSMENT: ACTIVITIES ARE NOT PREVENTED

## 2024-10-13 NOTE — PLAN OF CARE
Problem: Chronic Conditions and Co-morbidities  Goal: Patient's chronic conditions and co-morbidity symptoms are monitored and maintained or improved  Outcome: Progressing  Flowsheets (Taken 10/12/2024 0838 by Robyn Cyr, RN)  Care Plan - Patient's Chronic Conditions and Co-Morbidity Symptoms are Monitored and Maintained or Improved: Monitor and assess patient's chronic conditions and comorbid symptoms for stability, deterioration, or improvement     Problem: Discharge Planning  Goal: Discharge to home or other facility with appropriate resources  Outcome: Progressing  Flowsheets (Taken 10/12/2024 0838 by Robyn Cyr, RN)  Discharge to home or other facility with appropriate resources: Identify barriers to discharge with patient and caregiver  Note: Patient needs set up with home health prior to being discharged     Problem: Pain  Goal: Verbalizes/displays adequate comfort level or baseline comfort level  Outcome: Progressing  Flowsheets (Taken 10/13/2024 1434)  Verbalizes/displays adequate comfort level or baseline comfort level:   Encourage patient to monitor pain and request assistance   Assess pain using appropriate pain scale     Problem: Safety - Adult  Goal: Free from fall injury  Outcome: Progressing  Flowsheets (Taken 10/13/2024 1434)  Free From Fall Injury: Instruct family/caregiver on patient safety     . Patient participated in plan of care and contributed to goal setting.

## 2024-10-13 NOTE — PLAN OF CARE
Problem: Chronic Conditions and Co-morbidities  Goal: Patient's chronic conditions and co-morbidity symptoms are monitored and maintained or improved  10/13/2024 1750 by Esperanza Rojas LPN  Outcome: Progressing  Flowsheets (Taken 10/13/2024 1750)  Care Plan - Patient's Chronic Conditions and Co-Morbidity Symptoms are Monitored and Maintained or Improved: Monitor and assess patient's chronic conditions and comorbid symptoms for stability, deterioration, or improvement  10/13/2024 1434 by Yu Arredondo RN  Outcome: Progressing  Flowsheets (Taken 10/12/2024 0838 by Robyn Cyr, RN)  Care Plan - Patient's Chronic Conditions and Co-Morbidity Symptoms are Monitored and Maintained or Improved: Monitor and assess patient's chronic conditions and comorbid symptoms for stability, deterioration, or improvement     Problem: Discharge Planning  Goal: Discharge to home or other facility with appropriate resources  10/13/2024 1750 by Esperanza Rojas LPN  Outcome: Progressing  Flowsheets (Taken 10/13/2024 1750)  Discharge to home or other facility with appropriate resources:   Identify barriers to discharge with patient and caregiver   Arrange for needed discharge resources and transportation as appropriate  10/13/2024 1434 by Yu Arredondo RN  Outcome: Progressing  Flowsheets (Taken 10/12/2024 0838 by Robyn Cyr, RN)  Discharge to home or other facility with appropriate resources: Identify barriers to discharge with patient and caregiver  Note: Patient needs set up with home health prior to being discharged     Problem: Pain  Goal: Verbalizes/displays adequate comfort level or baseline comfort level  10/13/2024 1750 by Esperanza Rojas LPN  Outcome: Progressing  Flowsheets (Taken 10/13/2024 1750)  Verbalizes/displays adequate comfort level or baseline comfort level:   Encourage patient to monitor pain and request assistance   Assess pain using appropriate pain scale   Implement non-pharmacological measures as

## 2024-10-14 LAB
ANION GAP SERPL CALC-SCNC: 13 MEQ/L (ref 8–16)
BUN SERPL-MCNC: 8 MG/DL (ref 7–22)
CALCIUM SERPL-MCNC: 8.9 MG/DL (ref 8.5–10.5)
CHLORIDE SERPL-SCNC: 102 MEQ/L (ref 98–111)
CO2 SERPL-SCNC: 29 MEQ/L (ref 23–33)
CREAT SERPL-MCNC: 0.8 MG/DL (ref 0.4–1.2)
GFR SERPL CREATININE-BSD FRML MDRD: > 90 ML/MIN/1.73M2
GLUCOSE BLD STRIP.AUTO-MCNC: 125 MG/DL (ref 70–108)
GLUCOSE BLD STRIP.AUTO-MCNC: 198 MG/DL (ref 70–108)
GLUCOSE BLD STRIP.AUTO-MCNC: 274 MG/DL (ref 70–108)
GLUCOSE BLD STRIP.AUTO-MCNC: 96 MG/DL (ref 70–108)
GLUCOSE SERPL-MCNC: 114 MG/DL (ref 70–108)
HCT VFR BLD AUTO: 37.3 % (ref 42–52)
HGB BLD-MCNC: 11.9 GM/DL (ref 14–18)
POTASSIUM SERPL-SCNC: 4.1 MEQ/L (ref 3.5–5.2)
SODIUM SERPL-SCNC: 144 MEQ/L (ref 135–145)

## 2024-10-14 PROCEDURE — 36415 COLL VENOUS BLD VENIPUNCTURE: CPT

## 2024-10-14 PROCEDURE — 82948 REAGENT STRIP/BLOOD GLUCOSE: CPT

## 2024-10-14 PROCEDURE — 97530 THERAPEUTIC ACTIVITIES: CPT

## 2024-10-14 PROCEDURE — 85014 HEMATOCRIT: CPT

## 2024-10-14 PROCEDURE — 1200000000 HC SEMI PRIVATE

## 2024-10-14 PROCEDURE — 6370000000 HC RX 637 (ALT 250 FOR IP): Performed by: INTERNAL MEDICINE

## 2024-10-14 PROCEDURE — 97535 SELF CARE MNGMENT TRAINING: CPT

## 2024-10-14 PROCEDURE — 6370000000 HC RX 637 (ALT 250 FOR IP): Performed by: PHYSICIAN ASSISTANT

## 2024-10-14 PROCEDURE — 97116 GAIT TRAINING THERAPY: CPT

## 2024-10-14 PROCEDURE — 85018 HEMOGLOBIN: CPT

## 2024-10-14 PROCEDURE — 80048 BASIC METABOLIC PNL TOTAL CA: CPT

## 2024-10-14 RX ADMIN — GABAPENTIN 300 MG: 300 CAPSULE ORAL at 15:34

## 2024-10-14 RX ADMIN — PANTOPRAZOLE SODIUM 40 MG: 40 TABLET, DELAYED RELEASE ORAL at 06:52

## 2024-10-14 RX ADMIN — PIOGLITAZONE 30 MG: 30 TABLET ORAL at 08:34

## 2024-10-14 RX ADMIN — INSULIN LISPRO 2 UNITS: 100 INJECTION, SOLUTION INTRAVENOUS; SUBCUTANEOUS at 12:15

## 2024-10-14 RX ADMIN — CYCLOBENZAPRINE 10 MG: 10 TABLET, FILM COATED ORAL at 21:51

## 2024-10-14 RX ADMIN — SENNOSIDES AND DOCUSATE SODIUM 1 TABLET: 50; 8.6 TABLET ORAL at 21:51

## 2024-10-14 RX ADMIN — POLYETHYLENE GLYCOL 3350 17 G: 17 POWDER, FOR SOLUTION ORAL at 08:34

## 2024-10-14 RX ADMIN — SENNOSIDES AND DOCUSATE SODIUM 1 TABLET: 50; 8.6 TABLET ORAL at 08:34

## 2024-10-14 RX ADMIN — OXYCODONE 10 MG: 5 TABLET ORAL at 18:06

## 2024-10-14 RX ADMIN — BISACODYL 5 MG: 5 TABLET, COATED ORAL at 08:34

## 2024-10-14 RX ADMIN — ALOGLIPTIN 25 MG: 25 TABLET, FILM COATED ORAL at 08:34

## 2024-10-14 RX ADMIN — INSULIN LISPRO 1 UNITS: 100 INJECTION, SOLUTION INTRAVENOUS; SUBCUTANEOUS at 21:51

## 2024-10-14 RX ADMIN — ATORVASTATIN CALCIUM 40 MG: 40 TABLET, FILM COATED ORAL at 21:51

## 2024-10-14 RX ADMIN — GABAPENTIN 300 MG: 300 CAPSULE ORAL at 08:34

## 2024-10-14 RX ADMIN — OXYCODONE 10 MG: 5 TABLET ORAL at 10:46

## 2024-10-14 RX ADMIN — MIRTAZAPINE 30 MG: 30 TABLET, FILM COATED ORAL at 21:51

## 2024-10-14 RX ADMIN — AMLODIPINE BESYLATE 10 MG: 10 TABLET ORAL at 08:34

## 2024-10-14 RX ADMIN — INSULIN LISPRO 8 UNITS: 100 INJECTION, SOLUTION INTRAVENOUS; SUBCUTANEOUS at 12:14

## 2024-10-14 RX ADMIN — ACETAMINOPHEN 650 MG: 325 TABLET ORAL at 06:55

## 2024-10-14 RX ADMIN — LOSARTAN POTASSIUM 25 MG: 25 TABLET, FILM COATED ORAL at 08:34

## 2024-10-14 RX ADMIN — GABAPENTIN 300 MG: 300 CAPSULE ORAL at 21:51

## 2024-10-14 RX ADMIN — INSULIN GLARGINE 52 UNITS: 100 INJECTION, SOLUTION SUBCUTANEOUS at 21:51

## 2024-10-14 RX ADMIN — VENLAFAXINE HYDROCHLORIDE 150 MG: 150 CAPSULE, EXTENDED RELEASE ORAL at 08:34

## 2024-10-14 RX ADMIN — OXYCODONE 10 MG: 5 TABLET ORAL at 02:44

## 2024-10-14 ASSESSMENT — PAIN SCALES - GENERAL
PAINLEVEL_OUTOF10: 4
PAINLEVEL_OUTOF10: 0
PAINLEVEL_OUTOF10: 5
PAINLEVEL_OUTOF10: 4
PAINLEVEL_OUTOF10: 7
PAINLEVEL_OUTOF10: 0
PAINLEVEL_OUTOF10: 8
PAINLEVEL_OUTOF10: 8
PAINLEVEL_OUTOF10: 4
PAINLEVEL_OUTOF10: 7

## 2024-10-14 ASSESSMENT — PAIN - FUNCTIONAL ASSESSMENT
PAIN_FUNCTIONAL_ASSESSMENT: ACTIVITIES ARE NOT PREVENTED

## 2024-10-14 ASSESSMENT — PAIN DESCRIPTION - LOCATION
LOCATION: NECK
LOCATION: HEAD
LOCATION: HEAD
LOCATION: NECK

## 2024-10-14 ASSESSMENT — PAIN DESCRIPTION - ORIENTATION
ORIENTATION: LEFT
ORIENTATION: ANTERIOR
ORIENTATION: LEFT

## 2024-10-14 ASSESSMENT — PAIN DESCRIPTION - DESCRIPTORS
DESCRIPTORS: ACHING
DESCRIPTORS: ACHING
DESCRIPTORS: ACHING;SORE
DESCRIPTORS: ACHING;SHARP
DESCRIPTORS: ACHING
DESCRIPTORS: ACHING;SORE
DESCRIPTORS: ACHING

## 2024-10-14 ASSESSMENT — PAIN SCALES - WONG BAKER: WONGBAKER_NUMERICALRESPONSE: NO HURT

## 2024-10-14 ASSESSMENT — PAIN DESCRIPTION - PAIN TYPE: TYPE: ACUTE PAIN

## 2024-10-14 NOTE — PLAN OF CARE
Problem: Chronic Conditions and Co-morbidities  Goal: Patient's chronic conditions and co-morbidity symptoms are monitored and maintained or improved  10/13/2024 2239 by Rosemarie Valdes RN  Outcome: Progressing  Flowsheets (Taken 10/13/2024 1945)  Care Plan - Patient's Chronic Conditions and Co-Morbidity Symptoms are Monitored and Maintained or Improved: Monitor and assess patient's chronic conditions and comorbid symptoms for stability, deterioration, or improvement  10/13/2024 1750 by Esperanza Rojas LPN  Outcome: Progressing  Flowsheets (Taken 10/13/2024 1750)  Care Plan - Patient's Chronic Conditions and Co-Morbidity Symptoms are Monitored and Maintained or Improved: Monitor and assess patient's chronic conditions and comorbid symptoms for stability, deterioration, or improvement  10/13/2024 1434 by Yu Arredondo RN  Outcome: Progressing  Flowsheets (Taken 10/12/2024 0838 by Robyn Cyr, RN)  Care Plan - Patient's Chronic Conditions and Co-Morbidity Symptoms are Monitored and Maintained or Improved: Monitor and assess patient's chronic conditions and comorbid symptoms for stability, deterioration, or improvement     Problem: Discharge Planning  Goal: Discharge to home or other facility with appropriate resources  10/13/2024 2239 by Rosemarie Valdes RN  Outcome: Progressing  Flowsheets (Taken 10/13/2024 1945)  Discharge to home or other facility with appropriate resources: Identify barriers to discharge with patient and caregiver  10/13/2024 1750 by Esperanza Rojas LPN  Outcome: Progressing  Flowsheets (Taken 10/13/2024 1750)  Discharge to home or other facility with appropriate resources:   Identify barriers to discharge with patient and caregiver   Arrange for needed discharge resources and transportation as appropriate  10/13/2024 1434 by Yu Arredondo, RN  Outcome: Progressing  Flowsheets (Taken 10/12/2024 0838 by Robyn Cyr, RN)  Discharge to home or other facility with appropriate resources:

## 2024-10-14 NOTE — PLAN OF CARE
Problem: Chronic Conditions and Co-morbidities  Goal: Patient's chronic conditions and co-morbidity symptoms are monitored and maintained or improved  Outcome: Progressing  Flowsheets (Taken 10/13/2024 1945 by Rosemarie Valdes RN)  Care Plan - Patient's Chronic Conditions and Co-Morbidity Symptoms are Monitored and Maintained or Improved: Monitor and assess patient's chronic conditions and comorbid symptoms for stability, deterioration, or improvement     Problem: Discharge Planning  Goal: Discharge to home or other facility with appropriate resources  Outcome: Progressing  Flowsheets (Taken 10/14/2024 1723)  Discharge to home or other facility with appropriate resources: Identify barriers to discharge with patient and caregiver     Problem: Pain  Goal: Verbalizes/displays adequate comfort level or baseline comfort level  Outcome: Progressing  Flowsheets (Taken 10/14/2024 1723)  Verbalizes/displays adequate comfort level or baseline comfort level:   Encourage patient to monitor pain and request assistance   Assess pain using appropriate pain scale   Implement non-pharmacological measures as appropriate and evaluate response     Problem: ABCDS Injury Assessment  Goal: Absence of physical injury  Outcome: Progressing  Flowsheets (Taken 10/14/2024 1723)  Absence of Physical Injury: Implement safety measures based on patient assessment     Problem: Safety - Adult  Goal: Free from fall injury  Outcome: Progressing  Flowsheets (Taken 10/14/2024 1723)  Free From Fall Injury: Instruct family/caregiver on patient safety

## 2024-10-15 ENCOUNTER — APPOINTMENT (OUTPATIENT)
Dept: GENERAL RADIOLOGY | Age: 59
DRG: 472 | End: 2024-10-15
Attending: ORTHOPAEDIC SURGERY
Payer: MEDICARE

## 2024-10-15 LAB
ANION GAP SERPL CALC-SCNC: 9 MEQ/L (ref 8–16)
BUN SERPL-MCNC: 11 MG/DL (ref 7–22)
CALCIUM SERPL-MCNC: 9 MG/DL (ref 8.5–10.5)
CHLORIDE SERPL-SCNC: 101 MEQ/L (ref 98–111)
CO2 SERPL-SCNC: 31 MEQ/L (ref 23–33)
CREAT SERPL-MCNC: 0.8 MG/DL (ref 0.4–1.2)
GFR SERPL CREATININE-BSD FRML MDRD: > 90 ML/MIN/1.73M2
GLUCOSE BLD STRIP.AUTO-MCNC: 140 MG/DL (ref 70–108)
GLUCOSE BLD STRIP.AUTO-MCNC: 151 MG/DL (ref 70–108)
GLUCOSE BLD STRIP.AUTO-MCNC: 182 MG/DL (ref 70–108)
GLUCOSE BLD STRIP.AUTO-MCNC: 262 MG/DL (ref 70–108)
GLUCOSE SERPL-MCNC: 149 MG/DL (ref 70–108)
HCT VFR BLD AUTO: 37.2 % (ref 42–52)
HGB BLD-MCNC: 12.2 GM/DL (ref 14–18)
POTASSIUM SERPL-SCNC: 4 MEQ/L (ref 3.5–5.2)
SODIUM SERPL-SCNC: 141 MEQ/L (ref 135–145)

## 2024-10-15 PROCEDURE — 36415 COLL VENOUS BLD VENIPUNCTURE: CPT

## 2024-10-15 PROCEDURE — 92611 MOTION FLUOROSCOPY/SWALLOW: CPT

## 2024-10-15 PROCEDURE — 85014 HEMATOCRIT: CPT

## 2024-10-15 PROCEDURE — 74230 X-RAY XM SWLNG FUNCJ C+: CPT

## 2024-10-15 PROCEDURE — 85018 HEMOGLOBIN: CPT

## 2024-10-15 PROCEDURE — 6360000002 HC RX W HCPCS: Performed by: PHYSICIAN ASSISTANT

## 2024-10-15 PROCEDURE — 80048 BASIC METABOLIC PNL TOTAL CA: CPT

## 2024-10-15 PROCEDURE — 2580000003 HC RX 258: Performed by: PHYSICIAN ASSISTANT

## 2024-10-15 PROCEDURE — 6370000000 HC RX 637 (ALT 250 FOR IP): Performed by: PHYSICIAN ASSISTANT

## 2024-10-15 PROCEDURE — 6370000000 HC RX 637 (ALT 250 FOR IP): Performed by: INTERNAL MEDICINE

## 2024-10-15 PROCEDURE — 1200000000 HC SEMI PRIVATE

## 2024-10-15 PROCEDURE — 92526 ORAL FUNCTION THERAPY: CPT

## 2024-10-15 PROCEDURE — 2500000003 HC RX 250 WO HCPCS: Performed by: ORTHOPAEDIC SURGERY

## 2024-10-15 PROCEDURE — 82948 REAGENT STRIP/BLOOD GLUCOSE: CPT

## 2024-10-15 RX ORDER — DEXAMETHASONE SODIUM PHOSPHATE 4 MG/ML
8 INJECTION, SOLUTION INTRA-ARTICULAR; INTRALESIONAL; INTRAMUSCULAR; INTRAVENOUS; SOFT TISSUE EVERY 8 HOURS
Status: COMPLETED | OUTPATIENT
Start: 2024-10-15 | End: 2024-10-16

## 2024-10-15 RX ADMIN — AMLODIPINE BESYLATE 10 MG: 10 TABLET ORAL at 08:13

## 2024-10-15 RX ADMIN — DEXAMETHASONE SODIUM PHOSPHATE 8 MG: 4 INJECTION, SOLUTION INTRA-ARTICULAR; INTRALESIONAL; INTRAMUSCULAR; INTRAVENOUS; SOFT TISSUE at 21:50

## 2024-10-15 RX ADMIN — BARIUM SULFATE 30 ML: 400 SUSPENSION ORAL at 11:08

## 2024-10-15 RX ADMIN — PIOGLITAZONE 30 MG: 30 TABLET ORAL at 08:13

## 2024-10-15 RX ADMIN — ALOGLIPTIN 25 MG: 25 TABLET, FILM COATED ORAL at 08:13

## 2024-10-15 RX ADMIN — SENNOSIDES AND DOCUSATE SODIUM 1 TABLET: 50; 8.6 TABLET ORAL at 08:16

## 2024-10-15 RX ADMIN — OXYCODONE 10 MG: 5 TABLET ORAL at 00:43

## 2024-10-15 RX ADMIN — ATORVASTATIN CALCIUM 40 MG: 40 TABLET, FILM COATED ORAL at 21:49

## 2024-10-15 RX ADMIN — SENNOSIDES AND DOCUSATE SODIUM 1 TABLET: 50; 8.6 TABLET ORAL at 21:49

## 2024-10-15 RX ADMIN — SODIUM CHLORIDE, PRESERVATIVE FREE 10 ML: 5 INJECTION INTRAVENOUS at 14:24

## 2024-10-15 RX ADMIN — POLYETHYLENE GLYCOL 3350 17 G: 17 POWDER, FOR SOLUTION ORAL at 08:16

## 2024-10-15 RX ADMIN — GABAPENTIN 300 MG: 300 CAPSULE ORAL at 15:09

## 2024-10-15 RX ADMIN — BARIUM SULFATE 10 ML: 400 PASTE ORAL at 11:08

## 2024-10-15 RX ADMIN — INSULIN LISPRO 8 UNITS: 100 INJECTION, SOLUTION INTRAVENOUS; SUBCUTANEOUS at 12:16

## 2024-10-15 RX ADMIN — PANTOPRAZOLE SODIUM 40 MG: 40 TABLET, DELAYED RELEASE ORAL at 05:10

## 2024-10-15 RX ADMIN — VENLAFAXINE HYDROCHLORIDE 150 MG: 150 CAPSULE, EXTENDED RELEASE ORAL at 08:13

## 2024-10-15 RX ADMIN — MIRTAZAPINE 30 MG: 30 TABLET, FILM COATED ORAL at 21:49

## 2024-10-15 RX ADMIN — OXYCODONE 10 MG: 5 TABLET ORAL at 08:14

## 2024-10-15 RX ADMIN — INSULIN LISPRO 2 UNITS: 100 INJECTION, SOLUTION INTRAVENOUS; SUBCUTANEOUS at 21:51

## 2024-10-15 RX ADMIN — LOSARTAN POTASSIUM 25 MG: 25 TABLET, FILM COATED ORAL at 08:13

## 2024-10-15 RX ADMIN — CYCLOBENZAPRINE 10 MG: 10 TABLET, FILM COATED ORAL at 12:17

## 2024-10-15 RX ADMIN — BISACODYL 5 MG: 5 TABLET, COATED ORAL at 08:13

## 2024-10-15 RX ADMIN — INSULIN LISPRO 8 UNITS: 100 INJECTION, SOLUTION INTRAVENOUS; SUBCUTANEOUS at 17:15

## 2024-10-15 RX ADMIN — OXYCODONE 10 MG: 5 TABLET ORAL at 14:20

## 2024-10-15 RX ADMIN — SODIUM CHLORIDE, PRESERVATIVE FREE 10 ML: 5 INJECTION INTRAVENOUS at 21:50

## 2024-10-15 RX ADMIN — INSULIN GLARGINE 52 UNITS: 100 INJECTION, SOLUTION SUBCUTANEOUS at 21:50

## 2024-10-15 RX ADMIN — DEXAMETHASONE SODIUM PHOSPHATE 8 MG: 4 INJECTION, SOLUTION INTRA-ARTICULAR; INTRALESIONAL; INTRAMUSCULAR; INTRAVENOUS; SOFT TISSUE at 14:22

## 2024-10-15 RX ADMIN — BARIUM SULFATE 30 ML: 0.81 POWDER, FOR SUSPENSION ORAL at 11:08

## 2024-10-15 RX ADMIN — INSULIN LISPRO 8 UNITS: 100 INJECTION, SOLUTION INTRAVENOUS; SUBCUTANEOUS at 08:14

## 2024-10-15 RX ADMIN — GABAPENTIN 300 MG: 300 CAPSULE ORAL at 21:49

## 2024-10-15 RX ADMIN — GABAPENTIN 300 MG: 300 CAPSULE ORAL at 08:13

## 2024-10-15 ASSESSMENT — PAIN SCALES - GENERAL
PAINLEVEL_OUTOF10: 7
PAINLEVEL_OUTOF10: 5
PAINLEVEL_OUTOF10: 0
PAINLEVEL_OUTOF10: 8
PAINLEVEL_OUTOF10: 4
PAINLEVEL_OUTOF10: 5
PAINLEVEL_OUTOF10: 8
PAINLEVEL_OUTOF10: 8
PAINLEVEL_OUTOF10: 3
PAINLEVEL_OUTOF10: 4
PAINLEVEL_OUTOF10: 8
PAINLEVEL_OUTOF10: 8

## 2024-10-15 ASSESSMENT — PAIN DESCRIPTION - DESCRIPTORS
DESCRIPTORS: ACHING

## 2024-10-15 ASSESSMENT — PAIN DESCRIPTION - ORIENTATION
ORIENTATION: LEFT

## 2024-10-15 ASSESSMENT — PAIN DESCRIPTION - LOCATION
LOCATION: NECK
LOCATION: NECK;SHOULDER
LOCATION: NECK
LOCATION: NECK
LOCATION: SHOULDER;NECK

## 2024-10-15 ASSESSMENT — PAIN - FUNCTIONAL ASSESSMENT
PAIN_FUNCTIONAL_ASSESSMENT: ACTIVITIES ARE NOT PREVENTED

## 2024-10-15 ASSESSMENT — PAIN SCALES - WONG BAKER
WONGBAKER_NUMERICALRESPONSE: NO HURT
WONGBAKER_NUMERICALRESPONSE: NO HURT

## 2024-10-15 NOTE — PLAN OF CARE
Problem: Chronic Conditions and Co-morbidities  Goal: Patient's chronic conditions and co-morbidity symptoms are monitored and maintained or improved  10/15/2024 0135 by Maryann Live RN  Outcome: Progressing  Flowsheets (Taken 10/13/2024 1945 by Rosemarie Valdes, RN)  Care Plan - Patient's Chronic Conditions and Co-Morbidity Symptoms are Monitored and Maintained or Improved: Monitor and assess patient's chronic conditions and comorbid symptoms for stability, deterioration, or improvement     Problem: Discharge Planning  Goal: Discharge to home or other facility with appropriate resources  10/15/2024 0135 by Maryann Live RN  Outcome: Progressing  Flowsheets (Taken 10/14/2024 1723 by Esperanza Rojas LPN)  Discharge to home or other facility with appropriate resources: Identify barriers to discharge with patient and caregiver     Problem: Pain  Goal: Verbalizes/displays adequate comfort level or baseline comfort level  10/15/2024 0135 by Maryann Live RN  Outcome: Progressing  Flowsheets (Taken 10/14/2024 1723 by Esperanza Rojas LPN)  Verbalizes/displays adequate comfort level or baseline comfort level:   Encourage patient to monitor pain and request assistance   Assess pain using appropriate pain scale   Implement non-pharmacological measures as appropriate and evaluate response     Problem: ABCDS Injury Assessment  Goal: Absence of physical injury  10/15/2024 0135 by Maryann Live RN  Outcome: Progressing  Flowsheets (Taken 10/15/2024 0135)  Absence of Physical Injury: Implement safety measures based on patient assessment     Problem: Safety - Adult  Goal: Free from fall injury  10/15/2024 0135 by Maryann Live RN  Outcome: Progressing  Flowsheets (Taken 10/15/2024 0135)  Free From Fall Injury: Instruct family/caregiver on patient safety   Care plan reviewed with patient.  Patient verbalize understanding of the plan of care and contribute to goal setting.

## 2024-10-15 NOTE — PLAN OF CARE
Problem: Chronic Conditions and Co-morbidities  Goal: Patient's chronic conditions and co-morbidity symptoms are monitored and maintained or improved  Outcome: Progressing  Flowsheets (Taken 10/15/2024 1913)  Care Plan - Patient's Chronic Conditions and Co-Morbidity Symptoms are Monitored and Maintained or Improved:   Monitor and assess patient's chronic conditions and comorbid symptoms for stability, deterioration, or improvement   Collaborate with multidisciplinary team to address chronic and comorbid conditions and prevent exacerbation or deterioration   Update acute care plan with appropriate goals if chronic or comorbid symptoms are exacerbated and prevent overall improvement and discharge     Problem: Discharge Planning  Goal: Discharge to home or other facility with appropriate resources  Outcome: Progressing  Flowsheets (Taken 10/15/2024 1913)  Discharge to home or other facility with appropriate resources:   Identify barriers to discharge with patient and caregiver   Arrange for needed discharge resources and transportation as appropriate   Arrange for interpreters to assist at discharge as needed   Identify discharge learning needs (meds, wound care, etc)   Refer to discharge planning if patient needs post-hospital services based on physician order or complex needs related to functional status, cognitive ability or social support system     Problem: Pain  Goal: Verbalizes/displays adequate comfort level or baseline comfort level  Outcome: Progressing  Flowsheets (Taken 10/15/2024 1913)  Verbalizes/displays adequate comfort level or baseline comfort level:   Encourage patient to monitor pain and request assistance   Administer analgesics based on type and severity of pain and evaluate response   Assess pain using appropriate pain scale     Problem: ABCDS Injury Assessment  Goal: Absence of physical injury  Outcome: Progressing  Flowsheets (Taken 10/15/2024 1913)  Absence of Physical Injury: Implement

## 2024-10-16 VITALS
SYSTOLIC BLOOD PRESSURE: 146 MMHG | OXYGEN SATURATION: 97 % | TEMPERATURE: 98.8 F | HEIGHT: 71 IN | BODY MASS INDEX: 32.96 KG/M2 | DIASTOLIC BLOOD PRESSURE: 83 MMHG | WEIGHT: 235.4 LBS | HEART RATE: 94 BPM | RESPIRATION RATE: 16 BRPM

## 2024-10-16 LAB
ANION GAP SERPL CALC-SCNC: 12 MEQ/L (ref 8–16)
BUN SERPL-MCNC: 14 MG/DL (ref 7–22)
CALCIUM SERPL-MCNC: 9 MG/DL (ref 8.5–10.5)
CHLORIDE SERPL-SCNC: 97 MEQ/L (ref 98–111)
CO2 SERPL-SCNC: 28 MEQ/L (ref 23–33)
CREAT SERPL-MCNC: 0.7 MG/DL (ref 0.4–1.2)
GFR SERPL CREATININE-BSD FRML MDRD: > 90 ML/MIN/1.73M2
GLUCOSE BLD STRIP.AUTO-MCNC: 243 MG/DL (ref 70–108)
GLUCOSE BLD STRIP.AUTO-MCNC: 246 MG/DL (ref 70–108)
GLUCOSE SERPL-MCNC: 244 MG/DL (ref 70–108)
HCT VFR BLD AUTO: 34.7 % (ref 42–52)
HGB BLD-MCNC: 11.3 GM/DL (ref 14–18)
POTASSIUM SERPL-SCNC: 4.4 MEQ/L (ref 3.5–5.2)
SODIUM SERPL-SCNC: 137 MEQ/L (ref 135–145)

## 2024-10-16 PROCEDURE — 2580000003 HC RX 258: Performed by: PHYSICIAN ASSISTANT

## 2024-10-16 PROCEDURE — 97530 THERAPEUTIC ACTIVITIES: CPT

## 2024-10-16 PROCEDURE — 85014 HEMATOCRIT: CPT

## 2024-10-16 PROCEDURE — 6360000002 HC RX W HCPCS: Performed by: PHYSICIAN ASSISTANT

## 2024-10-16 PROCEDURE — 85018 HEMOGLOBIN: CPT

## 2024-10-16 PROCEDURE — 80048 BASIC METABOLIC PNL TOTAL CA: CPT

## 2024-10-16 PROCEDURE — 6370000000 HC RX 637 (ALT 250 FOR IP): Performed by: INTERNAL MEDICINE

## 2024-10-16 PROCEDURE — 97116 GAIT TRAINING THERAPY: CPT

## 2024-10-16 PROCEDURE — 36415 COLL VENOUS BLD VENIPUNCTURE: CPT

## 2024-10-16 PROCEDURE — 82948 REAGENT STRIP/BLOOD GLUCOSE: CPT

## 2024-10-16 PROCEDURE — 6370000000 HC RX 637 (ALT 250 FOR IP): Performed by: PHYSICIAN ASSISTANT

## 2024-10-16 RX ORDER — DEXAMETHASONE SODIUM PHOSPHATE 4 MG/ML
6 INJECTION, SOLUTION INTRA-ARTICULAR; INTRALESIONAL; INTRAMUSCULAR; INTRAVENOUS; SOFT TISSUE EVERY 8 HOURS
Status: DISCONTINUED | OUTPATIENT
Start: 2024-10-16 | End: 2024-10-16 | Stop reason: HOSPADM

## 2024-10-16 RX ORDER — METHYLPREDNISOLONE 4 MG
TABLET, DOSE PACK ORAL
Qty: 21 KIT | Refills: 0 | Status: SHIPPED | OUTPATIENT
Start: 2024-10-16 | End: 2024-10-22

## 2024-10-16 RX ADMIN — INSULIN LISPRO 8 UNITS: 100 INJECTION, SOLUTION INTRAVENOUS; SUBCUTANEOUS at 08:27

## 2024-10-16 RX ADMIN — PANTOPRAZOLE SODIUM 40 MG: 40 TABLET, DELAYED RELEASE ORAL at 05:38

## 2024-10-16 RX ADMIN — ALOGLIPTIN 25 MG: 25 TABLET, FILM COATED ORAL at 08:28

## 2024-10-16 RX ADMIN — SENNOSIDES AND DOCUSATE SODIUM 1 TABLET: 50; 8.6 TABLET ORAL at 08:28

## 2024-10-16 RX ADMIN — GABAPENTIN 300 MG: 300 CAPSULE ORAL at 08:28

## 2024-10-16 RX ADMIN — PIOGLITAZONE 30 MG: 30 TABLET ORAL at 08:28

## 2024-10-16 RX ADMIN — DEXAMETHASONE SODIUM PHOSPHATE 6 MG: 4 INJECTION, SOLUTION INTRA-ARTICULAR; INTRALESIONAL; INTRAMUSCULAR; INTRAVENOUS; SOFT TISSUE at 14:13

## 2024-10-16 RX ADMIN — DEXAMETHASONE SODIUM PHOSPHATE 8 MG: 4 INJECTION, SOLUTION INTRA-ARTICULAR; INTRALESIONAL; INTRAMUSCULAR; INTRAVENOUS; SOFT TISSUE at 05:39

## 2024-10-16 RX ADMIN — GABAPENTIN 300 MG: 300 CAPSULE ORAL at 14:15

## 2024-10-16 RX ADMIN — SODIUM CHLORIDE, PRESERVATIVE FREE 10 ML: 5 INJECTION INTRAVENOUS at 08:27

## 2024-10-16 RX ADMIN — VENLAFAXINE HYDROCHLORIDE 150 MG: 150 CAPSULE, EXTENDED RELEASE ORAL at 08:29

## 2024-10-16 RX ADMIN — POLYETHYLENE GLYCOL 3350 17 G: 17 POWDER, FOR SOLUTION ORAL at 08:28

## 2024-10-16 RX ADMIN — BISACODYL 5 MG: 5 TABLET, COATED ORAL at 08:28

## 2024-10-16 RX ADMIN — INSULIN LISPRO 1 UNITS: 100 INJECTION, SOLUTION INTRAVENOUS; SUBCUTANEOUS at 05:52

## 2024-10-16 RX ADMIN — LOSARTAN POTASSIUM 25 MG: 25 TABLET, FILM COATED ORAL at 08:28

## 2024-10-16 RX ADMIN — INSULIN LISPRO 8 UNITS: 100 INJECTION, SOLUTION INTRAVENOUS; SUBCUTANEOUS at 12:29

## 2024-10-16 RX ADMIN — AMLODIPINE BESYLATE 10 MG: 10 TABLET ORAL at 08:28

## 2024-10-16 RX ADMIN — INSULIN LISPRO 1 UNITS: 100 INJECTION, SOLUTION INTRAVENOUS; SUBCUTANEOUS at 12:29

## 2024-10-16 NOTE — CARE COORDINATION
10/16/24, 3:30 PM EDT    Patient goals/plan/ treatment preferences discussed by  and .  Patient goals/plan/ treatment preferences reviewed with patient/ family.  Patient/ family verbalize understanding of discharge plan and are in agreement with goal/plan/treatment preferences.  Understanding was demonstrated using the teach back method.  AVS provided by RN at time of discharge, which includes all necessary medical information pertaining to the patients current course of illness, treatment, post-discharge goals of care, and treatment preferences.     Services At/After Discharge: Outpatient     Home today, IVELISSE drain removed. To follow with surgeon.    
Patient going home with drain and needs daily volume checks with home health. Patient choice is [facility] and [facility].  [facility] called and voicemail left for on call nurse.  [facility] called and voicemail left for on call nurse.  Gabi from [facility] called back and they do not take patients insurance.  Spoke with intake for [facility] who advised they do not take patients insurance.   Electronically signed by CARLOZ PERRY RN on 10/13/2024 at 12:38 PM   
Resources Medicare;Medicaid   Community Resources None   Social/Functional History   Active  Yes   Discharge Planning   Type of Residence Apartment   Living Arrangements Alone   Current Services Prior To Admission Durable Medical Equipment   Current DME Prior to Arrival Cane;Cpap;Other (Comment)  (grab bars throughout bathroom)   Potential Assistance Needed Home Care   DME Ordered? No   Potential Assistance Purchasing Medications No   Type of Home Care Services None   Patient expects to be discharged to: Apartment   Services At/After Discharge   Mode of Transport at Discharge Other (see comment)  (family)   Confirm Follow Up Transport Self   Condition of Participation: Discharge Planning   The Patient and/or Patient Representative was provided with a Choice of Provider? Patient   The Patient and/Or Patient Representative agree with the Discharge Plan? Yes   Freedom of Choice list was provided with basic dialogue that supports the patient's individualized plan of care/goals, treatment preferences, and shares the quality data associated with the providers?  Yes

## 2024-10-16 NOTE — PLAN OF CARE
Problem: Chronic Conditions and Co-morbidities  Goal: Patient's chronic conditions and co-morbidity symptoms are monitored and maintained or improved  10/16/2024 0639 by Pierre Osuna RN  Outcome: Progressing  10/15/2024 1913 by Suki Browne LPN  Outcome: Progressing  Flowsheets (Taken 10/15/2024 1913)  Care Plan - Patient's Chronic Conditions and Co-Morbidity Symptoms are Monitored and Maintained or Improved:   Monitor and assess patient's chronic conditions and comorbid symptoms for stability, deterioration, or improvement   Collaborate with multidisciplinary team to address chronic and comorbid conditions and prevent exacerbation or deterioration   Update acute care plan with appropriate goals if chronic or comorbid symptoms are exacerbated and prevent overall improvement and discharge     Problem: Discharge Planning  Goal: Discharge to home or other facility with appropriate resources  10/16/2024 0639 by Pierre Osuna RN  Outcome: Progressing  10/15/2024 1913 by Suki Browne LPN  Outcome: Progressing  Flowsheets (Taken 10/15/2024 1913)  Discharge to home or other facility with appropriate resources:   Identify barriers to discharge with patient and caregiver   Arrange for needed discharge resources and transportation as appropriate   Arrange for interpreters to assist at discharge as needed   Identify discharge learning needs (meds, wound care, etc)   Refer to discharge planning if patient needs post-hospital services based on physician order or complex needs related to functional status, cognitive ability or social support system     Problem: Pain  Goal: Verbalizes/displays adequate comfort level or baseline comfort level  10/16/2024 0639 by Pierre Osuna RN  Outcome: Progressing  10/15/2024 1913 by Suki Browne LPN  Outcome: Progressing  Flowsheets (Taken 10/15/2024 1913)  Verbalizes/displays adequate comfort level or baseline comfort level:   Encourage patient to

## 2024-10-16 NOTE — PROGRESS NOTES
[facility]  INPATIENT PHYSICAL THERAPY  DAILY NOTE  [facility] ORTHOPEDICS 7K - 7K-23/023-A      Discharge Recommendations: Home with Assist as Needed  Equipment Recommendations: No                 Time In: 1039  Time Out: 1102  Timed Code Treatment Minutes: 23 Minutes  Minutes: 23          Date: 10/14/2024  Patient Name: Sanya Rodriguez,  Gender:  male        MRN: [id number]  : 1965  (59 y.o.)     Referring Practitioner: Moe Trevino PA  Diagnosis: Cervical stenosis of spinal canal  Additional Pertinent Hx: Pt admitted for and is s/p C3-7 ACDF 10/11/24 by Dr Ramires     Prior Level of Function:  Lives With: Alone  Type of Home: Apartment  Home Layout: One level  Home Access: Level entry  Home Equipment: Cane   Bathroom Shower/Tub: Walk-in shower  Bathroom Toilet: Handicap height  Bathroom Equipment: Grab bars in shower, Grab bars around toilet  Bathroom Accessibility: Accessible    ADL Assistance: Independent  Homemaking Assistance: Independent  Ambulation Assistance: Independent  Transfer Assistance: Independent  Active : Yes    Restrictions/Precautions:  Restrictions/Precautions: Fall Risk, General Precautions  Required Braces or Orthoses  Cervical: miami J  Position Activity Restriction  Spinal Precautions: No Bending, No Lifting, No Twisting (c-spine)     SUBJECTIVE: nursing ok'd therapy pt agreeable and reported poss discharge today, I encouraged pt to get up to walk several times during the day       PAIN: neck and shoulders- pt reported that he has been waiting for pain meds - did notify nursing of pt wanting pain meds     Vitals: Vitals not assessed per clinical judgement, see nursing flowsheet    OBJECTIVE:  Bed Mobility:  Supine to Sit: Stand By Assistance, slow and guarded     Transfers:  Sit to Stand: Stand By Assistance  Stand to Sit:Stand By Assistance  Slow and guarded w/ wide base of support   Ambulation:  Stand By Assistance  Distance: 250x1 and short distance in room 
1604 - Pt arrives to pacu at this time. Pt awake and alert. States pain is 8/10 in neck. C-Collar in place. Respirs easy and unlabored on 6L simple mask. VSS.    1608 - Pain meds given for pain 8/10    1613 - Pt states pain is unchanged, pain meds given. .     1618 - Pain unchanged per pt, pain meds given.    1623 - Pain meds given.     1631 Pt states pain is unchanged. pain meds, Oxygen decreased to 2L NC.     1636 - Pt states pain slightly improved 7/10. Pain meds given.    1645 - Pt states pain has improved. Denies needs. VSS.     1649 - Spoke with Daughter Macarena, updated that pt was out of surgery and going to room k-23.    1651 - Attempted to call report to . No response.     1656 - Pt meets criteria to discharge from pacu at this time. Respirs easy and unlabored on 3L NC. Pain tolerable per pt. VSS. Report called to [facility] on 7K. Awaiting transport.    1711 - Transported to K-23 via transport in stable condition.     
AVS reviewed with patient. No concerns voiced. Patient received his medication from outpatient pharmacy. Patient escorted to [facility] via wheelchair by staff. Patient being transported home by [facility].  
Department of Orthopedic Surgery  Spine Service  Attending Progress Note        Subjective:  POD#1 radicular sx improved, still feels off balance/clumsy, headaches resolved. Ambulating to bathroom. No issues voiding.   C/o sore throat but able to to eat a turkey sandwich last night. Denies n/v. Drain outputs elevated. BS elevated. IM consulted    Vitals  VITALS:  BP (!) 150/86   Pulse 100   Temp 98 °F (36.7 °C) (Oral)   Resp 16   Ht 1.803 m (5' 11\")   Wt 106.8 kg (235 lb 6.4 oz)   SpO2 90%   BMI 32.83 kg/m²   24HR INTAKE/OUTPUT:    Intake/Output Summary (Last 24 hours) at 10/12/2024 0633  Last data filed at 10/12/2024 0358  Gross per 24 hour   Intake 2100 ml   Output 170 ml   Net 1930 ml     URINARY CATHETER OUTPUT (Hernandez):     DRAIN/TUBE OUTPUT:  Closed/Suction Drain Neck-Output (ml): 50 ml      PHYSICAL EXAM:    Orientation:  alert and oriented to person, place and time    Incision:  dressing in place, clean, dry, intact    Upper Extremity Motor :   Deltoid:  5/5  Biceps:  5/5  Triceps:  5/5  : 5/5    Upper Motor Neuron Signs:  None  Upper Extremity Sensory:  Intact C3-T1 distribution      LABS:    HgB:    Lab Results   Component Value Date/Time    HGB 12.5 12/19/2023 07:06 PM     Hemoglobin/Hematocrit:    Lab Results   Component Value Date/Time    HGB 12.5 12/19/2023 07:06 PM    HCT 39.4 12/19/2023 07:06 PM     BMP:    Lab Results   Component Value Date/Time     12/19/2023 07:06 PM    K 4.0 10/11/2024 12:26 PM     12/19/2023 07:06 PM    CO2 25 12/19/2023 07:06 PM    BUN 9 12/19/2023 07:06 PM    CREATININE 0.7 12/19/2023 07:06 PM    CALCIUM 9.1 12/19/2023 07:06 PM    GFRAA >60 03/27/2019 05:26 PM    LABGLOM >60 12/19/2023 07:06 PM    GLUCOSE 225 12/19/2023 07:11 PM       ASSESSMENT AND PLAN:    Post operative day 1 status post C3-7 ACDF    1:  Monitor labs and drain output  2:  Activity Level:  As tolerated. PT/OT  3:  Pain Control:  Good  4:  Discharge Planning:  Pending, likely tomorrow - 
Department of Orthopedic Surgery  Spine Service  Attending Progress Note        Subjective:  POD#2 radicular sx improved. C/o sore throat but eating well. Denies n/v.     Vitals  VITALS:  BP (!) 147/79   Pulse 85   Temp 98.1 °F (36.7 °C) (Oral)   Resp 17   Ht 1.803 m (5' 11\")   Wt 106.8 kg (235 lb 6.4 oz)   SpO2 98%   BMI 32.83 kg/m²   24HR INTAKE/OUTPUT:    Intake/Output Summary (Last 24 hours) at 10/13/2024 1001  Last data filed at 10/13/2024 0838  Gross per 24 hour   Intake 1080 ml   Output 30 ml   Net 1050 ml     URINARY CATHETER OUTPUT (Hernandez):     DRAIN/TUBE OUTPUT:  Closed/Suction Drain Neck-Output (ml): 30 ml      PHYSICAL EXAM:    Orientation:  alert and oriented to person, place and time    Incision:  dressing in place, clean, dry, intact    Upper Extremity Motor :   Deltoid:  5/5  Biceps:  5/5  Triceps:  5/5  : 5/5    Upper Motor Neuron Signs:  None  Upper Extremity Sensory:  Intact C3-T1 distribution      LABS:    HgB:    Lab Results   Component Value Date/Time    HGB 11.5 10/13/2024 06:12 AM     Hemoglobin/Hematocrit:    Lab Results   Component Value Date/Time    HGB 11.5 10/13/2024 06:12 AM    HCT 36.2 10/13/2024 06:12 AM     BMP:    Lab Results   Component Value Date/Time     10/13/2024 06:12 AM    K 3.9 10/13/2024 06:12 AM     10/13/2024 06:12 AM    CO2 29 10/13/2024 06:12 AM    BUN 14 10/13/2024 06:12 AM    CREATININE 0.8 10/13/2024 06:12 AM    CALCIUM 8.2 10/13/2024 06:12 AM    GFRAA >60 03/27/2019 05:26 PM    LABGLOM > 90 10/13/2024 06:12 AM    LABGLOM >60 12/19/2023 07:06 PM    GLUCOSE 147 10/13/2024 06:12 AM       ASSESSMENT AND PLAN:    Post operative day 2 status post C3-7 ACDF    1:  Monitor labs and drain output  2:  Activity Level:  As tolerated. PT/OT  3:  Pain Control:  Good  4:  Discharge Planning:  today with home health    Roney Boyd PA-C      
Department of Orthopedic Surgery  Spine Service  Attending Progress Note        Subjective:  POD#3 overall pain improved. Tolerated diet, denies issues with swallowing. No HH agency available, drain outputs decreased     Vitals  VITALS:  /84   Pulse 87   Temp 97.9 °F (36.6 °C) (Oral)   Resp 17   Ht 1.803 m (5' 11\")   Wt 106.8 kg (235 lb 6.4 oz)   SpO2 98%   BMI 32.83 kg/m²   24HR INTAKE/OUTPUT:    Intake/Output Summary (Last 24 hours) at 10/14/2024 0720  Last data filed at 10/14/2024 0642  Gross per 24 hour   Intake 1370.96 ml   Output 62 ml   Net 1308.96 ml     URINARY CATHETER OUTPUT (Hernandez):     DRAIN/TUBE OUTPUT:  Closed/Suction Drain Neck-Output (ml): 10 ml      PHYSICAL EXAM:    Orientation:  alert and oriented to person, place and time    Incision:  dressing in place, clean, dry, intact    Upper Extremity Motor :   Deltoid:  5/5  Biceps:  5/5  Triceps:  5/5  : 5/5    Upper Motor Neuron Signs:  None  Upper Extremity Sensory:  Intact C3-T1 distribution      LABS:    HgB:    Lab Results   Component Value Date/Time    HGB 11.5 10/13/2024 06:12 AM     Hemoglobin/Hematocrit:    Lab Results   Component Value Date/Time    HGB 11.5 10/13/2024 06:12 AM    HCT 36.2 10/13/2024 06:12 AM     BMP:    Lab Results   Component Value Date/Time     10/13/2024 06:12 AM    K 3.9 10/13/2024 06:12 AM     10/13/2024 06:12 AM    CO2 29 10/13/2024 06:12 AM    BUN 14 10/13/2024 06:12 AM    CREATININE 0.8 10/13/2024 06:12 AM    CALCIUM 8.2 10/13/2024 06:12 AM    GFRAA >60 03/27/2019 05:26 PM    LABGLOM > 90 10/13/2024 06:12 AM    LABGLOM >60 12/19/2023 07:06 PM    GLUCOSE 147 10/13/2024 06:12 AM       ASSESSMENT AND PLAN:    Post operative day 3 status post C3-7 ACDF    1:  Monitor labs and drain output  2:  Activity Level:  As tolerated. PT/OT  3:  Pain Control:  Good  4:  Discharge Planning: Today - recheck IVELISSE drain this afternoon, if <10 mL okay for discharge home without drain    MOISÉS Manuel      
Department of Orthopedic Surgery  Spine Service  Attending Progress Note        Subjective:  POD#5 drain outputs decreased. MBS yesterday, recommended regular and thing liquids, patient still on regular diet. Ate chicken breast for dinner and feels swallowing is improved. Decadron restarted.     Vitals  VITALS:  /70   Pulse 83   Temp 98.2 °F (36.8 °C) (Oral)   Resp 18   Ht 1.803 m (5' 11\")   Wt 106.8 kg (235 lb 6.4 oz)   SpO2 92%   BMI 32.83 kg/m²   24HR INTAKE/OUTPUT:    Intake/Output Summary (Last 24 hours) at 10/16/2024 0654  Last data filed at 10/16/2024 0438  Gross per 24 hour   Intake 1785 ml   Output 35 ml   Net 1750 ml     URINARY CATHETER OUTPUT (Hernandez):     DRAIN/TUBE OUTPUT:  Closed/Suction Drain Neck-Output (ml): 5 ml      PHYSICAL EXAM:    Orientation:  alert and oriented to person, place and time    Incision:  dressing in place, clean, dry, intact  No obvious swelling    Upper Extremity Motor :   Deltoid:  5/5  Biceps:  5/5  Triceps:  5/5  : 5/5    Upper Motor Neuron Signs:  None  Upper Extremity Sensory:  Intact C3-T1 distribution      LABS:    HgB:    Lab Results   Component Value Date/Time    HGB 12.2 10/15/2024 05:22 AM     Hemoglobin/Hematocrit:    Lab Results   Component Value Date/Time    HGB 12.2 10/15/2024 05:22 AM    HCT 37.2 10/15/2024 05:22 AM     BMP:    Lab Results   Component Value Date/Time     10/15/2024 05:22 AM    K 4.0 10/15/2024 05:22 AM     10/15/2024 05:22 AM    CO2 31 10/15/2024 05:22 AM    BUN 11 10/15/2024 05:22 AM    CREATININE 0.8 10/15/2024 05:22 AM    CALCIUM 9.0 10/15/2024 05:22 AM    GFRAA >60 03/27/2019 05:26 PM    LABGLOM > 90 10/15/2024 05:22 AM    LABGLOM >60 12/19/2023 07:06 PM    GLUCOSE 149 10/15/2024 05:22 AM       ASSESSMENT AND PLAN:    Post operative day 5 status post C3-7 ACDF    1:  Monitor labs and drain output - low enough can be removed   2:  Activity Level:  As tolerated. PT/OT  3:  Pain Control:  Good  4:  Discharge 
INTERNAL MEDICINE Progress Note  10/13/2024 12:07 PM  Subjective:   Admit Date: 10/11/2024  PCP: Gracia Fatima MD  Interval History:     Sorethroat, cough  No CP  No SOB    Objective:   Vitals: BP (!) 147/79   Pulse 85   Temp 98.1 °F (36.7 °C) (Oral)   Resp 17   Ht 1.803 m (5' 11\")   Wt 106.8 kg (235 lb 6.4 oz)   SpO2 98%   BMI 32.83 kg/m²   General appearance: alert and cooperative with exam  HEENT: Head: atraumatic  Neck:  neck collar + drain  Lungs: clear to auscultation bilaterally  Heart: S1, S2 normal  Abdomen: soft, non-tender; bowel sounds normal; no masses,  no organomegaly  Extremities: extremities normal, atraumatic, no cyanosis or edema  Neurologic: Mental status: Alert, oriented, thought content appropriate      Medications:   Scheduled Meds:   insulin lispro  0-4 Units SubCUTAneous 4x Daily AC & HS    insulin lispro  8 Units SubCUTAneous TID WC    amLODIPine  10 mg Oral Daily    atorvastatin  40 mg Oral Nightly    gabapentin  300 mg Oral TID    insulin glargine  52 Units SubCUTAneous Nightly    losartan  25 mg Oral Daily    mirtazapine  30 mg Oral Nightly    pantoprazole  40 mg Oral QAM AC    pioglitazone  30 mg Oral Daily    alogliptin  25 mg Oral Daily    venlafaxine  150 mg Oral Daily    sodium chloride flush  5-40 mL IntraVENous 2 times per day    polyethylene glycol  17 g Oral Daily    bisacodyl  5 mg Oral Daily    sennosides-docusate sodium  1 tablet Oral BID     Continuous Infusions:   sodium chloride Stopped (10/13/24 1003)    sodium chloride      dextrose         Lab Results:   CBC:   Recent Labs     10/12/24  0744 10/13/24  0612   HGB 12.2* 11.5*     BMP:    Recent Labs     10/11/24  1226 10/12/24  0744 10/13/24  0612   NA  --  139 141   K 4.0 4.1 3.9   CL  --  103 103   CO2  --  22* 29   BUN  --  13 14   CREATININE  --  0.9 0.8   GLUCOSE  --  192* 147*     HgBA1c:    Lab Results   Component Value Date/Time    LABA1C 9.2 12/19/2023 05:15 AM     TSH:    Lab Results   Component Value 
INTERNAL MEDICINE Progress Note  10/14/2024 6:04 PM  Subjective:   Admit Date: 10/11/2024  PCP: Gracia Fatima MD  Interval History:     No CP  No SOB    Objective:   Vitals: /80   Pulse 79   Temp 98.8 °F (37.1 °C) (Oral)   Resp 18   Ht 1.803 m (5' 11\")   Wt 106.8 kg (235 lb 6.4 oz)   SpO2 97%   BMI 32.83 kg/m²   General appearance: alert and cooperative with exam  HEENT: Head: atraumatic  Neck:  neck collar + drain  Lungs: clear to auscultation bilaterally  Heart: S1, S2 normal  Abdomen: soft, non-tender; bowel sounds normal; no masses,  no organomegaly  Extremities: extremities normal, atraumatic, no cyanosis or edema  Neurologic: Mental status: Alert, oriented, thought content appropriate      Medications:   Scheduled Meds:   insulin lispro  0-4 Units SubCUTAneous 4x Daily AC & HS    insulin lispro  8 Units SubCUTAneous TID WC    amLODIPine  10 mg Oral Daily    atorvastatin  40 mg Oral Nightly    gabapentin  300 mg Oral TID    insulin glargine  52 Units SubCUTAneous Nightly    losartan  25 mg Oral Daily    mirtazapine  30 mg Oral Nightly    pantoprazole  40 mg Oral QAM AC    pioglitazone  30 mg Oral Daily    alogliptin  25 mg Oral Daily    venlafaxine  150 mg Oral Daily    sodium chloride flush  5-40 mL IntraVENous 2 times per day    polyethylene glycol  17 g Oral Daily    bisacodyl  5 mg Oral Daily    sennosides-docusate sodium  1 tablet Oral BID     Continuous Infusions:   sodium chloride Stopped (10/11/24 2001)    sodium chloride      dextrose         Lab Results:   CBC:   Recent Labs     10/12/24  0744 10/13/24  0612 10/14/24  0712   HGB 12.2* 11.5* 11.9*     BMP:    Recent Labs     10/12/24  0744 10/13/24  0612 10/14/24  0712    141 144   K 4.1 3.9 4.1    103 102   CO2 22* 29 29   BUN 13 14 8   CREATININE 0.9 0.8 0.8   GLUCOSE 192* 147* 114*     HgBA1c:    Lab Results   Component Value Date/Time    LABA1C 9.2 12/19/2023 05:15 AM     TSH:    Lab Results   Component Value Date/Time    
[facility]  SPEECH THERAPY  [facility] ORTHOPEDICS 7K  Modified Barium Swallow+tx    Discharge Recommendations: Home with Home Exercise Program  DIET ORDER RECOMMENDATIONS AFTER EVALUATION: Regular and thin liquids  Strategies:  Full Upright Position, Small Bite/Sip, Pulmonary Monitoring, Limit Distractions, and Monitor for Fatigue     SLP Individual Minutes  Time In: 1100  Time Out: 1118  Minutes: 18  Timed Code Treatment Minutes: 0 Minutes       Date: 10/15/2024  Patient Name: Sanya Rodriguez      CSN: [id number]   : 1965  (59 y.o.)  Gender: male   Referring Physician:  St Sarah Villanueva MD  Diagnosis: Cervical stenosis of spinal canal  Precautions: c-collar precautions, aspiration risk   History of Present Illness/Injury: The patient is a 59 y.o. male with chronic radicular neck pain from cervical spinal stenosis, admitted for cervical spine decompression surgery. Doing well post op. Improved extremity numbness. No arms or legs weakness. No CP, no SOB.  H/o DM 2, on Insulin, h/o JOSEPHINE, on CPAP.   has a past medical history of Arthritis, Cellulitis, CHF (congestive heart failure) (HCC), Depression, Diabetes mellitus (HCC), Hypertension, Sleep apnea, and TIA (transient ischemic attack).  Current Diet: Regular and thin    Pain: No pain reported.    SUBJECTIVE:  Patient seen upright in bed in fluoro suite. Patient pleasant and cooperative with assessment, excellent insight demonstrated. RN reports patient with clear lung sounds and patient endorses high activity level with active participation in therapies.     OBJECTIVE:    Respiratory Status:  Room Air    Behavioral Observation:  Alert and Oriented    PATIENT WAS EVALUATED USING:  Barium:Thin Liquids, Mildly Thick Liquids, Puree, Soft Solids, Coarse Solids, and Mixed Consistency    ORAL PHASE CHATO SCORE: (Dysphagia outcome and severity scale)  6 = WFL/Modified Independent - Normal Diet - May have mild oral delay    PHARYNGEAL PHASE CHATO SCORE: 
[facility] ORTHOPEDICS 7K  Occupational Therapy  Daily Note    Discharge Recommendations: Home with assist as needed  Equipment Recommendations: No continue to monitor       Time In: 1500  Time Out: 1523  Timed Code Treatment Minutes: 23 Minutes  Minutes: 23          Date: 10/14/2024  Patient Name: Sanya Rodriguez,   Gender: male      Room: [facility]23/023-A  MRN: [id number]  : 1965  (59 y.o.)  Referring Practitioner: Moe Trevino PA  Diagnosis: Cervical stenosis of spinal canal  Additional Pertinent Hx: s/p C3-7 ACDF 10/11/24    Restrictions/Precautions:  Restrictions/Precautions: Fall Risk, General Precautions  Required Braces or Orthoses  Cervical: miami J  Position Activity Restriction  Spinal Precautions: No Bending, No Lifting, No Twisting (c-spine)      Social/Functional History:  Lives With: Alone  Type of Home: Apartment  Home Layout: One level  Home Access: Level entry  Home Equipment: Cane   Bathroom Shower/Tub: Walk-in shower  Bathroom Toilet: Handicap height  Bathroom Equipment: Grab bars in shower, Grab bars around toilet  Bathroom Accessibility: Accessible       ADL Assistance: Independent  Homemaking Assistance: Independent  Ambulation Assistance: Independent  Transfer Assistance: Independent    Active : Yes  Occupation: Part time employment  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       SUBJECTIVE: Patient sleeping in bed upon arrival; minimal verbal stimulation to alert; agreeable to therapy this date.  Patient agreeable to therapy this date.  D/c delayed d/t unable to locate HH agency d/t drain.  Patient notes he has difficulty with swallowing however requires to take a drink following each bite.  Patient cognitively intake to make appropriate strategies with feeding.    PAIN: 0/10:      Vitals: Vitals not assessed per clinical judgement, see nursing flowsheet    COGNITION: WFL    ADL:   Feeding: Modified Independent.  
[facility]  SPEECH THERAPY  [facility] ORTHOPED[facility] 7K  Clinical Swallow Evaluation    Discharge Recommendations: Home with Home Exercise Program  DIET ORDER RECOMMENDATIONS AFTER EVALUATION: Regular diet and thin liquids  Strategies:  Full Upright Position and Small Bite/Sip     SLP Individual Minutes  Time In: 1205  Time Out: 1213  Minutes: 8  Timed Code Treatment Minutes: 0 Minutes       Date: 10/12/2024  Patient Name: Sanya Rodriguez      CSN: [id number]   : 1965  (59 y.o.)  Gender: male   Referring Physician:  Roney Boyd, PA-C     Diagnosis: Cervical stenosis of spinal canal    History of Present Illness/Injury: The patient is a 59 y.o. male with chronic radicular neck pain from cervical spinal stenosis, admitted for cervical spine decompression surgery. Doing well post op. Improved extremity numbness. No arms or legs weakness. No CP, no SOB.  H/o DM 2, on Insulin, h/o JOSEPHINE, on CPAP.  Past Medical History:   Diagnosis Date    Arthritis     Cellulitis     CHF (congestive heart failure) (HCC)     Depression     Diabetes mellitus (HCC)     Hypertension     Sleep apnea     uses a cpap    TIA (transient ischemic attack)        SUBJECTIVE:  Patient seen with RN approval. Patient upright in armchair with c-collar in place. Pleasant and cooperative with ST assessment.     OBJECTIVE:    Pain:  No pain reported.    Current Diet: Regular and thin liquids    Respiratory Status:  Room Air    Behavioral Observation:  Alert and Oriented    CRANIAL NERVE ASSESSMENT   CN V (Trigeminal) Closes and Opens Mandible WFL    Rotary Jaw Movement WFL      CN VII (Facial) Cheeks Hold Food out of Sulci WFL    Opens, Closes/Seals, Protrudes, Retracts Lips WFL    General Appearance WFL    Sensation WFL      CN X (Vagus - Pharyngeal) Raises Back of Tongue WFL      CN XI (Accessory) Lifts Soft Palate WFL      CN XII (Hypoglossal) Elevates Tongue Up and Back WFL    Protrusion   WFL    Lateralizes Tongue WFL    Sensation WFL 
Patient did not bring any personal clothes, jewelery, glasses, dentures/partials or hearing aids.  
Patient educated on how to use incentive spirometer. Patient verbalized understanding and demonstrated proper use. Emphasized importance and usage of device, with coughing and deep breathing every 2 hours while awake.        
Patient in chair, call light within reach. Patient voices no concerns at this time. Reported off to primary nurse, Pierre.    NEMO Felipe/SN  
Patient oriented to Same Day department and admitted to Same Day Surgery room 19.   Patient verbalized approval for first name, last initial with physician name on unit whiteboard.     Plan of care reviewed with patient.   Patient room whiteboard filled out and discussed with patient and responsible adult.   Patient and responsible adult offered Same Day Welcome Packet to review.    Call light in reach.   Bed in lowest position, locked, with one bed rail up.   SCDs and warming blanket in place.  Appropriate arm bands on patient.   Bathroom offered.   All questions and concerns of patient addressed.        Meds to Beds:   Patient informed of [facility] Meds to [facility]eds program during admission. Patient has declined use of program.     
Philippe called from [facility] and said someone would call tomorrow to let us know if they are able to take patient. Faxed face sheet with information to office.  
Spoke with [facility]na [facility] and they do not go to [place].   
[facility] called and are unable to accept patient.  Called [facility] and are unable to accept at this time.   [facility] ([facility]) and are unable to accept patient.  [facility] unable to accept patient d/t patient not being in jurisdiction.  [facility] unable to accept patient.   [facility] called and message left with answering service.  
Independent  Distance: 600x1  Surface: Level Tile  Device: No Device  Gait Deviations: slower pranav but good step length, he demonstrated decreased heel strike and toe off at right vs left but clearing his feet from floor and noted decreased arm swing    - discussed walking program and progression   Stairs:  Not Tested- pt completed step previous session and had no questions or concerns as he doesn't have steps at home     Balance:  Balance w/o UE at support pt reaching out of base with mod I- we discussed his lifting restrictions     Exercise:  None    Functional Outcome Measures:  [facility] (6 CLICK) BASIC MOBILITY     AM-PAC Inpatient Mobility without Stair Climbing Raw Score : 20  AM-PAC Inpatient without Stair Climbing T-Scale Score : 60.57     Modified Ran Scale:  Not Applicable    ASSESSMENT:  Assessment:  pt progressing towards goals and mod I to independent with mobility   Activity Tolerance:  Patient tolerance of  treatment:Good.  Plan: Will discharge from IP therapy at this time pt mod I to independent w/ all mobility   Education:  Learners: Patient  Patient Education: Plan of Care, Bed Mobility, Transfers, Gait, Car Transfers    Goals:  Patient Goals : go home  Short Term Goals  Time Frame for Short Term Goals: at discharge  Short Term Goal 1: Pt to be Mod I for supine <> sit to get in/out of bed MET  Short Term Goal 2: Pt to be Mod I for sit <> stand to get up to ambulate MET  Short Term Goal 3: Pt to ambulate >250 ft without AD with Supervision for household distances MET  Long Term Goals  Time Frame for Long Term Goals : not set due to short ELOS    Following session, patient left in safe position with all fall risk precautions in place.       
elevated    MOISÉS Manuel      
, Decreased ADL status, Decreased endurance, Decreased strength  Prognosis: Good  REQUIRES OT FOLLOW-UP: Yes  Decision Making: Medium Complexity    Treatment Initiated: Treatment and education initiated within context of evaluation.  Evaluation time included review of current medical information, gathering information related to past medical, social and functional history, completion of standardized testing, formal and informal observation of tasks, assessment of data and development of plan of care and goals.  Treatment time included skilled education and facilitation of tasks to increase safety and independence with ADL's for improved functional independence and quality of life.    Patient Education:          Patient Education  Education Given To: Patient  Education Provided: Role of Therapy;Plan of Care;ADL Adaptive Strategies;Transfer Training;Precautions  Education Method: Demonstration;Verbal  Barriers to Learning: None  Education Outcome: Verbalized understanding;Demonstrated understanding    Plan:  Times Per Week: 6x  Current Treatment Recommendations: Strengthening, Balance training, Functional mobility training, Endurance training, Patient/Caregiver education & training, Safety education & training, Equipment evaluation, education, & procurement, Self-Care / ADL.  See long-term goal time frame for expected duration of plan of care.  If no long-term goals established, a short length of stay is anticipated.    Goals:  Patient goals : Return to [place]  Short Term Goals  Time Frame for Short Term Goals: until discharge  Short Term Goal 1: Patient will safely complete various functional transfers with MOD I and no AD to improve safety in living environment.  Short Term Goal 2: Patient will improve functional ambulation to/from community distances MOD I and no device in prep for return to OF.  Short Term Goal 3: Patient will complete full BADL routine MOD I and use of LHAE prn and energy conservation 
Structures, Functions, Activity Limitations Requiring Skilled Therapeutic Intervention: Decreased functional mobility , Decreased strength, Decreased endurance, Decreased balance  Assessment: Pt is 58 yo male that is deconditioned and participated well. Pt was generally Independent for mobility in OF and is at SBA to Supervision today. Pt is not safe for return to home at this time and would benefit from continued skilled PT to address strengthening, balance, bed mobility, endurance building, and functional mobility training.    Therapy Prognosis: Good    Requires PT Follow-Up: Yes    Patient Education:      .    Patient Education  Education Given To: Patient  Education Provided: Role of Therapy, Plan of Care, Home Exercise Program  Education Method: Demonstration, Verbal  Barriers to Learning: None  Education Outcome: Verbalized understanding, Demonstrated understanding       Plan:  Current Treatment Recommendations: Strengthening, Balance training, Gait training, Functional mobility training, Transfer training, Patient/Caregiver education & training, Safety education & training, Home exercise program, Therapeutic activities, Endurance training  General Plan:  (6x O)    Goals:  Patient Goals : go home  Short Term Goals  Time Frame for Short Term Goals: at discharge  Short Term Goal 1: Pt to be Mod I for supine <> sit to get in/out of bed  Short Term Goal 2: Pt to be Mod I for sit <> stand to get up to ambulate  Short Term Goal 3: Pt to ambulate >250 ft without AD with Supervision for household distances  Long Term Goals  Time Frame for Long Term Goals : not set due to short ELOS    Following session, patient left in safe position with all fall risk precautions in place.    Chioma Roy, MPT [id number]

## 2024-10-16 NOTE — PLAN OF CARE
Problem: Chronic Conditions and Co-morbidities  Goal: Patient's chronic conditions and co-morbidity symptoms are monitored and maintained or improved  10/16/2024 1311 by Suki Murillo LPN  Outcome: Completed     Problem: Discharge Planning  Goal: Discharge to home or other facility with appropriate resources  10/16/2024 1311 by Suki Murillo LPN  Outcome: Completed     Problem: Pain  Goal: Verbalizes/displays adequate comfort level or baseline comfort level  10/16/2024 1311 by Suki Murillo LPN  Outcome: Completed     Problem: ABCDS Injury Assessment  Goal: Absence of physical injury  10/16/2024 1311 by Suki Murillo LPN  Outcome: Completed     Problem: Safety - Adult  Goal: Free from fall injury  10/16/2024 1311 by Suki Murillo LPN  Outcome: Completed   Care plan reviewed with patient.  Patient verbalized understanding of the plan of care and contribute to goal setting.

## 2024-11-14 ENCOUNTER — APPOINTMENT (OUTPATIENT)
Dept: GENERAL RADIOLOGY | Age: 59
End: 2024-11-14
Payer: MEDICARE

## 2024-11-14 ENCOUNTER — HOSPITAL ENCOUNTER (OUTPATIENT)
Age: 59
Setting detail: OBSERVATION
Discharge: ANOTHER HOSPITAL | End: 2024-11-16
Attending: STUDENT IN AN ORGANIZED HEALTH CARE EDUCATION/TRAINING PROGRAM | Admitting: INTERNAL MEDICINE
Payer: MEDICARE

## 2024-11-14 ENCOUNTER — APPOINTMENT (OUTPATIENT)
Dept: CT IMAGING | Age: 59
End: 2024-11-14
Payer: MEDICARE

## 2024-11-14 DIAGNOSIS — R47.81 SLURRED SPEECH: Primary | ICD-10-CM

## 2024-11-14 PROBLEM — M54.2 CERVICAL PAIN (NECK): Status: ACTIVE | Noted: 2024-11-14

## 2024-11-14 LAB
ALBUMIN SERPL-MCNC: 4.4 G/DL (ref 3.5–5.2)
ALBUMIN/GLOB SERPL: 1.3 {RATIO} (ref 1–2.5)
ALP SERPL-CCNC: 142 U/L (ref 40–129)
ALT SERPL-CCNC: 32 U/L (ref 10–50)
ANION GAP SERPL CALCULATED.3IONS-SCNC: 12 MMOL/L (ref 9–16)
AST SERPL-CCNC: 19 U/L (ref 10–50)
BASOPHILS # BLD: 0.06 K/UL (ref 0–0.2)
BASOPHILS NFR BLD: 1 % (ref 0–2)
BILIRUB SERPL-MCNC: 0.4 MG/DL (ref 0–1.2)
BUN SERPL-MCNC: 6 MG/DL (ref 8–23)
BUN/CREAT SERPL: 8 (ref 9–20)
CALCIUM SERPL-MCNC: 9.5 MG/DL (ref 8.6–10.4)
CHLORIDE SERPL-SCNC: 100 MMOL/L (ref 98–107)
CO2 SERPL-SCNC: 25 MMOL/L (ref 20–31)
CREAT SERPL-MCNC: 0.8 MG/DL (ref 0.7–1.2)
EOSINOPHIL # BLD: 0.06 K/UL (ref 0–0.44)
EOSINOPHILS RELATIVE PERCENT: 1 % (ref 1–4)
ERYTHROCYTE [DISTWIDTH] IN BLOOD BY AUTOMATED COUNT: 13.6 % (ref 11.8–14.4)
GFR, ESTIMATED: >90 ML/MIN/1.73M2
GLUCOSE BLD-MCNC: 194 MG/DL (ref 74–100)
GLUCOSE SERPL-MCNC: 188 MG/DL (ref 74–99)
HCT VFR BLD AUTO: 43.8 % (ref 40.7–50.3)
HGB BLD-MCNC: 14.4 G/DL (ref 13–17)
IMM GRANULOCYTES # BLD AUTO: 0.07 K/UL (ref 0–0.3)
IMM GRANULOCYTES NFR BLD: 1 %
INR PPP: 0.9
LYMPHOCYTES NFR BLD: 1.59 K/UL (ref 1.1–3.7)
LYMPHOCYTES RELATIVE PERCENT: 16 % (ref 24–43)
MCH RBC QN AUTO: 29.3 PG (ref 25.2–33.5)
MCHC RBC AUTO-ENTMCNC: 32.9 G/DL (ref 28.4–34.8)
MCV RBC AUTO: 89 FL (ref 82.6–102.9)
MONOCYTES NFR BLD: 0.38 K/UL (ref 0.1–1.2)
MONOCYTES NFR BLD: 4 % (ref 3–12)
NEUTROPHILS NFR BLD: 77 % (ref 36–65)
NEUTS SEG NFR BLD: 7.76 K/UL (ref 1.5–8.1)
NRBC BLD-RTO: 0 PER 100 WBC
PLATELET # BLD AUTO: 193 K/UL (ref 138–453)
PMV BLD AUTO: 10 FL (ref 8.1–13.5)
POTASSIUM SERPL-SCNC: 4.3 MMOL/L (ref 3.7–5.3)
PROT SERPL-MCNC: 7.7 G/DL (ref 6.6–8.7)
PROTHROMBIN TIME: 11.9 SEC (ref 11.7–14.1)
RBC # BLD AUTO: 4.92 M/UL (ref 4.21–5.77)
SODIUM SERPL-SCNC: 137 MMOL/L (ref 136–145)
TROPONIN I SERPL HS-MCNC: 8 NG/L (ref 0–22)
WBC OTHER # BLD: 9.9 K/UL (ref 3.5–11.3)

## 2024-11-14 PROCEDURE — 94761 N-INVAS EAR/PLS OXIMETRY MLT: CPT

## 2024-11-14 PROCEDURE — 71045 X-RAY EXAM CHEST 1 VIEW: CPT

## 2024-11-14 PROCEDURE — 93005 ELECTROCARDIOGRAM TRACING: CPT | Performed by: STUDENT IN AN ORGANIZED HEALTH CARE EDUCATION/TRAINING PROGRAM

## 2024-11-14 PROCEDURE — 85025 COMPLETE CBC W/AUTO DIFF WBC: CPT

## 2024-11-14 PROCEDURE — 99285 EMERGENCY DEPT VISIT HI MDM: CPT

## 2024-11-14 PROCEDURE — 6360000004 HC RX CONTRAST MEDICATION: Performed by: STUDENT IN AN ORGANIZED HEALTH CARE EDUCATION/TRAINING PROGRAM

## 2024-11-14 PROCEDURE — 85610 PROTHROMBIN TIME: CPT

## 2024-11-14 PROCEDURE — G0378 HOSPITAL OBSERVATION PER HR: HCPCS

## 2024-11-14 PROCEDURE — 82947 ASSAY GLUCOSE BLOOD QUANT: CPT

## 2024-11-14 PROCEDURE — G0508 CRIT CARE TELEHEA CONSULT 60: HCPCS | Performed by: STUDENT IN AN ORGANIZED HEALTH CARE EDUCATION/TRAINING PROGRAM

## 2024-11-14 PROCEDURE — 80053 COMPREHEN METABOLIC PANEL: CPT

## 2024-11-14 PROCEDURE — 70498 CT ANGIOGRAPHY NECK: CPT

## 2024-11-14 PROCEDURE — 70450 CT HEAD/BRAIN W/O DYE: CPT

## 2024-11-14 PROCEDURE — 2580000003 HC RX 258

## 2024-11-14 PROCEDURE — 6370000000 HC RX 637 (ALT 250 FOR IP)

## 2024-11-14 PROCEDURE — 84484 ASSAY OF TROPONIN QUANT: CPT

## 2024-11-14 PROCEDURE — 72125 CT NECK SPINE W/O DYE: CPT

## 2024-11-14 RX ORDER — VENLAFAXINE HYDROCHLORIDE 150 MG/1
150 CAPSULE, EXTENDED RELEASE ORAL DAILY
Status: DISCONTINUED | OUTPATIENT
Start: 2024-11-15 | End: 2024-11-16 | Stop reason: HOSPADM

## 2024-11-14 RX ORDER — SODIUM CHLORIDE 0.9 % (FLUSH) 0.9 %
10 SYRINGE (ML) INJECTION EVERY 12 HOURS SCHEDULED
Status: DISCONTINUED | OUTPATIENT
Start: 2024-11-14 | End: 2024-11-16 | Stop reason: HOSPADM

## 2024-11-14 RX ORDER — MORPHINE SULFATE 2 MG/ML
2 INJECTION, SOLUTION INTRAMUSCULAR; INTRAVENOUS
Status: DISCONTINUED | OUTPATIENT
Start: 2024-11-14 | End: 2024-11-16 | Stop reason: HOSPADM

## 2024-11-14 RX ORDER — IOPAMIDOL 755 MG/ML
75 INJECTION, SOLUTION INTRAVASCULAR
Status: COMPLETED | OUTPATIENT
Start: 2024-11-14 | End: 2024-11-14

## 2024-11-14 RX ORDER — POLYETHYLENE GLYCOL 3350 17 G/17G
17 POWDER, FOR SOLUTION ORAL DAILY PRN
Status: DISCONTINUED | OUTPATIENT
Start: 2024-11-14 | End: 2024-11-16 | Stop reason: HOSPADM

## 2024-11-14 RX ORDER — ACETAMINOPHEN 325 MG/1
650 TABLET ORAL EVERY 6 HOURS PRN
Status: DISCONTINUED | OUTPATIENT
Start: 2024-11-14 | End: 2024-11-16 | Stop reason: HOSPADM

## 2024-11-14 RX ORDER — SODIUM CHLORIDE 0.9 % (FLUSH) 0.9 %
10 SYRINGE (ML) INJECTION PRN
Status: DISCONTINUED | OUTPATIENT
Start: 2024-11-14 | End: 2024-11-16 | Stop reason: HOSPADM

## 2024-11-14 RX ORDER — PANTOPRAZOLE SODIUM 40 MG/1
40 TABLET, DELAYED RELEASE ORAL
Status: DISCONTINUED | OUTPATIENT
Start: 2024-11-15 | End: 2024-11-16 | Stop reason: HOSPADM

## 2024-11-14 RX ORDER — TRAMADOL HYDROCHLORIDE 50 MG/1
50 TABLET ORAL EVERY 6 HOURS PRN
Status: DISCONTINUED | OUTPATIENT
Start: 2024-11-14 | End: 2024-11-16 | Stop reason: HOSPADM

## 2024-11-14 RX ORDER — MIRTAZAPINE 15 MG/1
30 TABLET, FILM COATED ORAL NIGHTLY
Status: DISCONTINUED | OUTPATIENT
Start: 2024-11-14 | End: 2024-11-16 | Stop reason: HOSPADM

## 2024-11-14 RX ORDER — BUSPIRONE HYDROCHLORIDE 15 MG/1
15 TABLET ORAL 3 TIMES DAILY
COMMUNITY
Start: 2024-10-29

## 2024-11-14 RX ORDER — ENOXAPARIN SODIUM 100 MG/ML
40 INJECTION SUBCUTANEOUS DAILY
Status: DISCONTINUED | OUTPATIENT
Start: 2024-11-15 | End: 2024-11-15

## 2024-11-14 RX ORDER — ONDANSETRON 2 MG/ML
4 INJECTION INTRAMUSCULAR; INTRAVENOUS EVERY 6 HOURS PRN
Status: DISCONTINUED | OUTPATIENT
Start: 2024-11-14 | End: 2024-11-16 | Stop reason: HOSPADM

## 2024-11-14 RX ORDER — ALOGLIPTIN 12.5 MG/1
12.5 TABLET, FILM COATED ORAL DAILY
Status: DISCONTINUED | OUTPATIENT
Start: 2024-11-15 | End: 2024-11-16 | Stop reason: HOSPADM

## 2024-11-14 RX ORDER — POTASSIUM CHLORIDE 1500 MG/1
40 TABLET, EXTENDED RELEASE ORAL PRN
Status: DISCONTINUED | OUTPATIENT
Start: 2024-11-14 | End: 2024-11-16 | Stop reason: HOSPADM

## 2024-11-14 RX ORDER — CLONAZEPAM 0.5 MG/1
0.5 TABLET ORAL 3 TIMES DAILY PRN
Status: DISCONTINUED | OUTPATIENT
Start: 2024-11-14 | End: 2024-11-16 | Stop reason: HOSPADM

## 2024-11-14 RX ORDER — TRAMADOL HYDROCHLORIDE 50 MG/1
50 TABLET ORAL EVERY 6 HOURS PRN
COMMUNITY
Start: 2024-10-08

## 2024-11-14 RX ORDER — MORPHINE SULFATE 4 MG/ML
4 INJECTION, SOLUTION INTRAMUSCULAR; INTRAVENOUS
Status: DISCONTINUED | OUTPATIENT
Start: 2024-11-14 | End: 2024-11-16 | Stop reason: HOSPADM

## 2024-11-14 RX ORDER — GABAPENTIN 300 MG/1
300 CAPSULE ORAL 3 TIMES DAILY
Status: DISCONTINUED | OUTPATIENT
Start: 2024-11-14 | End: 2024-11-16 | Stop reason: HOSPADM

## 2024-11-14 RX ORDER — LOSARTAN POTASSIUM 25 MG/1
25 TABLET ORAL DAILY
Status: DISCONTINUED | OUTPATIENT
Start: 2024-11-15 | End: 2024-11-16 | Stop reason: HOSPADM

## 2024-11-14 RX ORDER — INSULIN GLARGINE 100 [IU]/ML
52 INJECTION, SOLUTION SUBCUTANEOUS NIGHTLY
Status: DISCONTINUED | OUTPATIENT
Start: 2024-11-14 | End: 2024-11-16 | Stop reason: HOSPADM

## 2024-11-14 RX ORDER — ACETAMINOPHEN 650 MG/1
650 SUPPOSITORY RECTAL EVERY 6 HOURS PRN
Status: DISCONTINUED | OUTPATIENT
Start: 2024-11-14 | End: 2024-11-16 | Stop reason: HOSPADM

## 2024-11-14 RX ORDER — AMLODIPINE BESYLATE 10 MG/1
10 TABLET ORAL DAILY
Status: DISCONTINUED | OUTPATIENT
Start: 2024-11-15 | End: 2024-11-16 | Stop reason: HOSPADM

## 2024-11-14 RX ORDER — ONDANSETRON 4 MG/1
4 TABLET, ORALLY DISINTEGRATING ORAL EVERY 8 HOURS PRN
Status: DISCONTINUED | OUTPATIENT
Start: 2024-11-14 | End: 2024-11-16 | Stop reason: HOSPADM

## 2024-11-14 RX ORDER — CLONAZEPAM 0.5 MG/1
0.5 TABLET ORAL 3 TIMES DAILY PRN
COMMUNITY
Start: 2024-09-26

## 2024-11-14 RX ORDER — PANTOPRAZOLE SODIUM 40 MG/1
40 TABLET, DELAYED RELEASE ORAL NIGHTLY
COMMUNITY
Start: 2024-11-14

## 2024-11-14 RX ORDER — POTASSIUM CHLORIDE 7.45 MG/ML
10 INJECTION INTRAVENOUS PRN
Status: DISCONTINUED | OUTPATIENT
Start: 2024-11-14 | End: 2024-11-16 | Stop reason: HOSPADM

## 2024-11-14 RX ORDER — ATORVASTATIN CALCIUM 40 MG/1
40 TABLET, FILM COATED ORAL NIGHTLY
Status: DISCONTINUED | OUTPATIENT
Start: 2024-11-14 | End: 2024-11-16 | Stop reason: HOSPADM

## 2024-11-14 RX ORDER — SODIUM CHLORIDE 9 MG/ML
INJECTION, SOLUTION INTRAVENOUS PRN
Status: DISCONTINUED | OUTPATIENT
Start: 2024-11-14 | End: 2024-11-16 | Stop reason: HOSPADM

## 2024-11-14 RX ORDER — PIOGLITAZONE 15 MG/1
30 TABLET ORAL DAILY
Status: DISCONTINUED | OUTPATIENT
Start: 2024-11-15 | End: 2024-11-16 | Stop reason: HOSPADM

## 2024-11-14 RX ADMIN — BUSPIRONE HYDROCHLORIDE 15 MG: 5 TABLET ORAL at 23:59

## 2024-11-14 RX ADMIN — INSULIN GLARGINE 52 UNITS: 100 INJECTION, SOLUTION SUBCUTANEOUS at 23:58

## 2024-11-14 RX ADMIN — IOPAMIDOL 75 ML: 755 INJECTION, SOLUTION INTRAVENOUS at 21:11

## 2024-11-14 RX ADMIN — TRAMADOL HYDROCHLORIDE 50 MG: 50 TABLET, COATED ORAL at 23:59

## 2024-11-14 RX ADMIN — SODIUM CHLORIDE, PRESERVATIVE FREE 10 ML: 5 INJECTION INTRAVENOUS at 23:59

## 2024-11-14 RX ADMIN — MIRTAZAPINE 30 MG: 15 TABLET, FILM COATED ORAL at 23:59

## 2024-11-14 RX ADMIN — ATORVASTATIN CALCIUM 40 MG: 40 TABLET, FILM COATED ORAL at 23:59

## 2024-11-14 RX ADMIN — GABAPENTIN 300 MG: 300 CAPSULE ORAL at 23:59

## 2024-11-14 ASSESSMENT — PAIN SCALES - GENERAL: PAINLEVEL_OUTOF10: 7

## 2024-11-14 ASSESSMENT — PAIN DESCRIPTION - ORIENTATION: ORIENTATION: LOWER

## 2024-11-14 ASSESSMENT — PAIN DESCRIPTION - DESCRIPTORS: DESCRIPTORS: ACHING

## 2024-11-14 ASSESSMENT — PAIN - FUNCTIONAL ASSESSMENT: PAIN_FUNCTIONAL_ASSESSMENT: NONE - DENIES PAIN

## 2024-11-14 ASSESSMENT — PAIN DESCRIPTION - LOCATION: LOCATION: BACK;NECK

## 2024-11-15 ENCOUNTER — APPOINTMENT (OUTPATIENT)
Dept: MRI IMAGING | Age: 59
End: 2024-11-15
Payer: MEDICARE

## 2024-11-15 PROBLEM — Z79.4 TYPE 2 DIABETES MELLITUS WITHOUT COMPLICATION, WITH LONG-TERM CURRENT USE OF INSULIN (HCC): Status: ACTIVE | Noted: 2024-11-15

## 2024-11-15 PROBLEM — R47.1 DYSARTHRIA: Status: ACTIVE | Noted: 2024-11-15

## 2024-11-15 PROBLEM — Z86.73 HISTORY OF CVA (CEREBROVASCULAR ACCIDENT): Status: ACTIVE | Noted: 2024-11-15

## 2024-11-15 PROBLEM — E11.9 TYPE 2 DIABETES MELLITUS WITHOUT COMPLICATION, WITH LONG-TERM CURRENT USE OF INSULIN (HCC): Status: ACTIVE | Noted: 2024-11-15

## 2024-11-15 PROBLEM — Z98.1 STATUS POST CERVICAL SPINAL FUSION: Status: ACTIVE | Noted: 2024-11-15

## 2024-11-15 LAB
ANION GAP SERPL CALCULATED.3IONS-SCNC: 9 MMOL/L (ref 9–16)
BASOPHILS # BLD: 0.04 K/UL (ref 0–0.2)
BASOPHILS NFR BLD: 1 % (ref 0–2)
BUN SERPL-MCNC: 6 MG/DL (ref 8–23)
BUN/CREAT SERPL: 7 (ref 9–20)
CALCIUM SERPL-MCNC: 9 MG/DL (ref 8.6–10.4)
CHLORIDE SERPL-SCNC: 106 MMOL/L (ref 98–107)
CO2 SERPL-SCNC: 27 MMOL/L (ref 20–31)
CREAT SERPL-MCNC: 0.9 MG/DL (ref 0.7–1.2)
EKG ATRIAL RATE: 88 BPM
EKG P AXIS: 54 DEGREES
EKG P-R INTERVAL: 150 MS
EKG Q-T INTERVAL: 372 MS
EKG QRS DURATION: 96 MS
EKG QTC CALCULATION (BAZETT): 450 MS
EKG R AXIS: -24 DEGREES
EKG T AXIS: 70 DEGREES
EKG VENTRICULAR RATE: 88 BPM
EOSINOPHIL # BLD: 0.15 K/UL (ref 0–0.44)
EOSINOPHILS RELATIVE PERCENT: 2 % (ref 1–4)
ERYTHROCYTE [DISTWIDTH] IN BLOOD BY AUTOMATED COUNT: 13.6 % (ref 11.8–14.4)
GFR, ESTIMATED: >90 ML/MIN/1.73M2
GLUCOSE BLD-MCNC: 138 MG/DL (ref 74–100)
GLUCOSE BLD-MCNC: 155 MG/DL (ref 74–100)
GLUCOSE BLD-MCNC: 156 MG/DL (ref 74–100)
GLUCOSE BLD-MCNC: 160 MG/DL (ref 74–100)
GLUCOSE BLD-MCNC: 174 MG/DL (ref 74–100)
GLUCOSE SERPL-MCNC: 153 MG/DL (ref 74–99)
HCT VFR BLD AUTO: 37.8 % (ref 40.7–50.3)
HGB BLD-MCNC: 12.3 G/DL (ref 13–17)
IMM GRANULOCYTES # BLD AUTO: 0.05 K/UL (ref 0–0.3)
IMM GRANULOCYTES NFR BLD: 1 %
LYMPHOCYTES NFR BLD: 2.32 K/UL (ref 1.1–3.7)
LYMPHOCYTES RELATIVE PERCENT: 26 % (ref 24–43)
MCH RBC QN AUTO: 28.8 PG (ref 25.2–33.5)
MCHC RBC AUTO-ENTMCNC: 32.5 G/DL (ref 28.4–34.8)
MCV RBC AUTO: 88.5 FL (ref 82.6–102.9)
MONOCYTES NFR BLD: 0.55 K/UL (ref 0.1–1.2)
MONOCYTES NFR BLD: 6 % (ref 3–12)
NEUTROPHILS NFR BLD: 65 % (ref 36–65)
NEUTS SEG NFR BLD: 5.69 K/UL (ref 1.5–8.1)
NRBC BLD-RTO: 0 PER 100 WBC
PLATELET # BLD AUTO: 174 K/UL (ref 138–453)
PMV BLD AUTO: 10.1 FL (ref 8.1–13.5)
POTASSIUM SERPL-SCNC: 3.9 MMOL/L (ref 3.7–5.3)
RBC # BLD AUTO: 4.27 M/UL (ref 4.21–5.77)
SODIUM SERPL-SCNC: 142 MMOL/L (ref 136–145)
WBC OTHER # BLD: 8.8 K/UL (ref 3.5–11.3)

## 2024-11-15 PROCEDURE — G0378 HOSPITAL OBSERVATION PER HR: HCPCS

## 2024-11-15 PROCEDURE — 94761 N-INVAS EAR/PLS OXIMETRY MLT: CPT

## 2024-11-15 PROCEDURE — 70553 MRI BRAIN STEM W/O & W/DYE: CPT

## 2024-11-15 PROCEDURE — 70551 MRI BRAIN STEM W/O DYE: CPT

## 2024-11-15 PROCEDURE — 96376 TX/PRO/DX INJ SAME DRUG ADON: CPT

## 2024-11-15 PROCEDURE — 97162 PT EVAL MOD COMPLEX 30 MIN: CPT

## 2024-11-15 PROCEDURE — 6360000002 HC RX W HCPCS

## 2024-11-15 PROCEDURE — A9579 GAD-BASE MR CONTRAST NOS,1ML: HCPCS

## 2024-11-15 PROCEDURE — 36415 COLL VENOUS BLD VENIPUNCTURE: CPT

## 2024-11-15 PROCEDURE — 96374 THER/PROPH/DIAG INJ IV PUSH: CPT

## 2024-11-15 PROCEDURE — 6360000004 HC RX CONTRAST MEDICATION

## 2024-11-15 PROCEDURE — 82947 ASSAY GLUCOSE BLOOD QUANT: CPT

## 2024-11-15 PROCEDURE — 6360000002 HC RX W HCPCS: Performed by: INTERNAL MEDICINE

## 2024-11-15 PROCEDURE — 97165 OT EVAL LOW COMPLEX 30 MIN: CPT

## 2024-11-15 PROCEDURE — 6370000000 HC RX 637 (ALT 250 FOR IP): Performed by: NURSE PRACTITIONER

## 2024-11-15 PROCEDURE — 2580000003 HC RX 258

## 2024-11-15 PROCEDURE — 72156 MRI NECK SPINE W/O & W/DYE: CPT

## 2024-11-15 PROCEDURE — 80048 BASIC METABOLIC PNL TOTAL CA: CPT

## 2024-11-15 PROCEDURE — 6370000000 HC RX 637 (ALT 250 FOR IP)

## 2024-11-15 PROCEDURE — 85025 COMPLETE CBC W/AUTO DIFF WBC: CPT

## 2024-11-15 PROCEDURE — 93010 ELECTROCARDIOGRAM REPORT: CPT | Performed by: INTERNAL MEDICINE

## 2024-11-15 PROCEDURE — 96372 THER/PROPH/DIAG INJ SC/IM: CPT

## 2024-11-15 RX ORDER — DIAZEPAM 5 MG/1
10 TABLET ORAL ONCE
Status: COMPLETED | OUTPATIENT
Start: 2024-11-15 | End: 2024-11-15

## 2024-11-15 RX ORDER — DEXTROSE MONOHYDRATE 100 MG/ML
INJECTION, SOLUTION INTRAVENOUS CONTINUOUS PRN
Status: DISCONTINUED | OUTPATIENT
Start: 2024-11-15 | End: 2024-11-16 | Stop reason: HOSPADM

## 2024-11-15 RX ORDER — ENOXAPARIN SODIUM 100 MG/ML
30 INJECTION SUBCUTANEOUS 2 TIMES DAILY
Status: DISCONTINUED | OUTPATIENT
Start: 2024-11-15 | End: 2024-11-16 | Stop reason: HOSPADM

## 2024-11-15 RX ORDER — GLUCAGON 1 MG/ML
1 KIT INJECTION PRN
Status: DISCONTINUED | OUTPATIENT
Start: 2024-11-15 | End: 2024-11-16 | Stop reason: HOSPADM

## 2024-11-15 RX ORDER — INSULIN LISPRO 100 [IU]/ML
0-8 INJECTION, SOLUTION INTRAVENOUS; SUBCUTANEOUS
Status: DISCONTINUED | OUTPATIENT
Start: 2024-11-15 | End: 2024-11-16 | Stop reason: HOSPADM

## 2024-11-15 RX ADMIN — BUSPIRONE HYDROCHLORIDE 15 MG: 5 TABLET ORAL at 15:05

## 2024-11-15 RX ADMIN — ALOGLIPTIN 12.5 MG: 12.5 TABLET, FILM COATED ORAL at 08:29

## 2024-11-15 RX ADMIN — GADOTERIDOL 20 ML: 279.3 INJECTION, SOLUTION INTRAVENOUS at 09:42

## 2024-11-15 RX ADMIN — MORPHINE SULFATE 2 MG: 2 INJECTION, SOLUTION INTRAMUSCULAR; INTRAVENOUS at 12:35

## 2024-11-15 RX ADMIN — BUSPIRONE HYDROCHLORIDE 15 MG: 5 TABLET ORAL at 08:29

## 2024-11-15 RX ADMIN — LOSARTAN POTASSIUM 25 MG: 25 TABLET, FILM COATED ORAL at 08:30

## 2024-11-15 RX ADMIN — MIRTAZAPINE 30 MG: 15 TABLET, FILM COATED ORAL at 20:50

## 2024-11-15 RX ADMIN — MORPHINE SULFATE 4 MG: 4 INJECTION, SOLUTION INTRAMUSCULAR; INTRAVENOUS at 23:45

## 2024-11-15 RX ADMIN — INSULIN GLARGINE 52 UNITS: 100 INJECTION, SOLUTION SUBCUTANEOUS at 20:49

## 2024-11-15 RX ADMIN — GABAPENTIN 300 MG: 300 CAPSULE ORAL at 08:30

## 2024-11-15 RX ADMIN — PIOGLITAZONE 30 MG: 15 TABLET ORAL at 08:29

## 2024-11-15 RX ADMIN — MORPHINE SULFATE 4 MG: 4 INJECTION, SOLUTION INTRAMUSCULAR; INTRAVENOUS at 18:45

## 2024-11-15 RX ADMIN — VENLAFAXINE HYDROCHLORIDE 150 MG: 150 CAPSULE, EXTENDED RELEASE ORAL at 08:30

## 2024-11-15 RX ADMIN — PANTOPRAZOLE SODIUM 40 MG: 40 TABLET, DELAYED RELEASE ORAL at 08:30

## 2024-11-15 RX ADMIN — TRAMADOL HYDROCHLORIDE 50 MG: 50 TABLET, COATED ORAL at 10:37

## 2024-11-15 RX ADMIN — ENOXAPARIN SODIUM 30 MG: 100 INJECTION SUBCUTANEOUS at 08:33

## 2024-11-15 RX ADMIN — ATORVASTATIN CALCIUM 40 MG: 40 TABLET, FILM COATED ORAL at 20:50

## 2024-11-15 RX ADMIN — BUSPIRONE HYDROCHLORIDE 15 MG: 5 TABLET ORAL at 20:49

## 2024-11-15 RX ADMIN — SODIUM CHLORIDE, PRESERVATIVE FREE 10 ML: 5 INJECTION INTRAVENOUS at 20:50

## 2024-11-15 RX ADMIN — GABAPENTIN 300 MG: 300 CAPSULE ORAL at 15:05

## 2024-11-15 RX ADMIN — DIAZEPAM 10 MG: 5 TABLET ORAL at 08:29

## 2024-11-15 RX ADMIN — GABAPENTIN 300 MG: 300 CAPSULE ORAL at 20:50

## 2024-11-15 RX ADMIN — SODIUM CHLORIDE, PRESERVATIVE FREE 10 ML: 5 INJECTION INTRAVENOUS at 08:30

## 2024-11-15 RX ADMIN — ENOXAPARIN SODIUM 30 MG: 100 INJECTION SUBCUTANEOUS at 20:48

## 2024-11-15 RX ADMIN — AMLODIPINE BESYLATE 10 MG: 10 TABLET ORAL at 08:30

## 2024-11-15 ASSESSMENT — PAIN - FUNCTIONAL ASSESSMENT
PAIN_FUNCTIONAL_ASSESSMENT: ACTIVITIES ARE NOT PREVENTED

## 2024-11-15 ASSESSMENT — PAIN DESCRIPTION - DESCRIPTORS
DESCRIPTORS: ACHING;BURNING
DESCRIPTORS: BURNING;ACHING
DESCRIPTORS: ACHING

## 2024-11-15 ASSESSMENT — PAIN DESCRIPTION - ORIENTATION
ORIENTATION: MID;LOWER
ORIENTATION: LOWER
ORIENTATION: MID
ORIENTATION: MID;LOWER
ORIENTATION: MID;LOWER

## 2024-11-15 ASSESSMENT — PAIN DESCRIPTION - PAIN TYPE
TYPE: ACUTE PAIN
TYPE: CHRONIC PAIN
TYPE: ACUTE PAIN

## 2024-11-15 ASSESSMENT — PAIN SCALES - GENERAL
PAINLEVEL_OUTOF10: 8
PAINLEVEL_OUTOF10: 6
PAINLEVEL_OUTOF10: 8
PAINLEVEL_OUTOF10: 6
PAINLEVEL_OUTOF10: 3
PAINLEVEL_OUTOF10: 7
PAINLEVEL_OUTOF10: 6
PAINLEVEL_OUTOF10: 7

## 2024-11-15 ASSESSMENT — PAIN DESCRIPTION - LOCATION
LOCATION: BACK;HEAD
LOCATION: NECK;BACK
LOCATION: BACK;NECK
LOCATION: BACK
LOCATION: NECK;BACK
LOCATION: NECK;BACK
LOCATION: BACK

## 2024-11-15 ASSESSMENT — PAIN DESCRIPTION - ONSET
ONSET: ON-GOING
ONSET: SUDDEN
ONSET: ON-GOING

## 2024-11-15 ASSESSMENT — PAIN DESCRIPTION - FREQUENCY
FREQUENCY: CONTINUOUS

## 2024-11-15 NOTE — PROGRESS NOTES
Progress Note    SUBJECTIVE:    Patient seen for f/u of Dysarthria.  He resting in bed no distress. No neurological deficits.  No dysarthria at this time.  Up to BR and no issues     ROS:   Constitutional: negative  for fevers, and negative for chills.  Respiratory: negative for shortness of breath, negative for cough, and negative for wheezing  Cardiovascular: negative for chest pain, and negative for palpitations  Gastrointestinal: negative for abdominal pain, negative for nausea,negative for vomiting, negative for diarrhea, and negative for constipation     All other systems were reviewed with the patient and are negative unless otherwise stated in HPI      OBJECTIVE:      Vitals:   Vitals:    11/15/24 0645   BP: 105/69   Pulse: 77   Resp: 18   Temp: 97.6 °F (36.4 °C)   SpO2: 95%     Weight - Scale: 106.6 kg (235 lb 1.6 oz)   Height: 180.3 cm (5' 11\")     Weight  Wt Readings from Last 3 Encounters:   11/14/24 106.6 kg (235 lb 1.6 oz)   10/11/24 106.8 kg (235 lb 6.4 oz)   09/09/24 104.3 kg (230 lb)     Body mass index is 32.79 kg/m².    24HR INTAKE/OUTPUT:    No intake or output data in the 24 hours ending 11/15/24 0738  -----------------------------------------------------------------  Exam:    GEN:    Awake, alert and oriented x3.   EYES:  EOMI, pupils equal   NECK: Supple. No lymphadenopathy.  No carotid bruit  CVS:    regular rate and rhythm, no audible murmur  PULM:  CTA, no wheezes, rales or rhonchi, no acute respiratory distress  ABD:    Bowels sounds normal.  Abdomen is soft.  No distention.  no tenderness to palpation.   EXT:   no edema bilaterally .  No calf tenderness.   NEURO: Moves all extremities.  Motor and sensory are grossly intact  SKIN:  No rashes.  No skin lesions.    -----------------------------------------------------------------    Diagnostic Data:      Complete Blood Count:   Recent Labs     11/14/24  2045 11/15/24  0540   WBC 9.9 8.8   RBC 4.92 4.27   HGB 14.4 12.3*   HCT 43.8 37.8*   MCV

## 2024-11-15 NOTE — PROGRESS NOTES
MRI brain W/WO 11/15/24  IMPRESSION:  1. No acute infarct or acute intracranial process identified.  2. Mild chronic small vessel ischemic changes.    MRI cervical spine W/WO 11/15/24  IMPRESSION:  1. Status post anterior cervical discectomy and fusion from C3-C7.  2. Mild spinal canal stenosis at C3-C4 and C4-C5.  3. Multilevel neural foraminal narrowing as described above.      Plan  -formal barium swallow evaluation  -ENT evaluation for direct laryngoscopy and concern for focal cord paralysis  -can consider transfer to [facility] for formal neurology and neurosurgery evaluation and or even transfer to [facility] under general neurology as direct admission. vs if patient is not agreeable DC home with close outpatient FU with his neurosurgeon and neurologist   -case discussed with primary care team and access 11/15/24 at 2:00PM after extensive review of MRI  Cervical spine with our on call neurosurgery attending physician

## 2024-11-15 NOTE — PLAN OF CARE
Problem: Chronic Conditions and Co-morbidities  Goal: Patient's chronic conditions and co-morbidity symptoms are monitored and maintained or improved  Outcome: Progressing     Problem: Discharge Planning  Goal: Discharge to home or other facility with appropriate resources  Outcome: Progressing     Problem: Safety - Adult  Goal: Free from fall injury  Outcome: Progressing     Problem: Pain  Goal: Verbalizes/displays adequate comfort level or baseline comfort level  Outcome: Progressing     Problem: Nutrition Deficit:  Goal: Optimize nutritional status  11/15/2024 1259 by Adia Bowden RN  Outcome: Progressing

## 2024-11-15 NOTE — PROGRESS NOTES
Occupational Therapy  Facility/Department: [facility] MED SURG  Occupational Therapy Initial Assessment    Name: Sanya Rodriguez  : 1965  MRN: [id number]  Date of Service: 11/15/2024    Discharge Recommendations:  Home with assist PRN          Patient Diagnosis(es): The encounter diagnosis was Slurred speech.  Past Medical History:  has a past medical history of Arthritis, Cellulitis, CHF (congestive heart failure) ([facility]), Depression, Diabetes mellitus (HCC), Hypertension, Sleep apnea, and TIA (transient ischemic attack).  Past Surgical History:  has a past surgical history that includes Colon surgery () and cervical fusion (N/A, 10/11/2024).           Assessment  Assessment: 60 y/o M admitted to [facility] for slurred speech. Patient demonstrates mod I during self care, no further OT required at this time.  Prognosis: Good  Decision Making: Low Complexity  REQUIRES OT FOLLOW-UP: No     Plan  Occupational Therapy Plan  Additional Comments: eval only    Restrictions  Restrictions/Precautions  Restrictions/Precautions: Fall Risk, General Precautions    Subjective  General  Patient assessed for rehabilitation services?: Yes  Subjective  Subjective: Patient in bed upon arrival, agreeable to OT evaluation. Patient getting ready to head to MRI.     Social/Functional History  Social/Functional History  Lives With: Alone  Type of Home: Apartment  Home Layout: One level  Home Access: Level entry  Home Equipment: Walker - 4-Wheeled  Has the patient had two or more falls in the past year or any fall with injury in the past year?: No  ADL Assistance: Independent  Homemaking Assistance: Independent  Homemaking Responsibilities: Yes  Ambulation Assistance: Independent  Transfer Assistance: Independent  Active : Yes  Additional Comments: Pt reports IND with IADL's, ADL's, uses no AD for mobility normally but states he has \"episodes\" of weakness where he uses his 4WW for stability.    Objective       Safety Devices  Type of  Group Co-treatment   Time In 0915         Time Out 0925         Minutes 10                 Zohra Hinojosa, OTR/L

## 2024-11-15 NOTE — H&P
History and Physical    Patient:  Sanya Rodriguez  MRN: [id number]    Chief Complaint: Dysarthria    History Obtained From:  patient, electronic medical record    PCP: Gracia Fatima MD    History of Present Illness:   The patient is a 59 y.o. male with a past medical history of CVA and cervical stenosis who presents with slurred speech started around 5PM today.  Patient states this started while driving to  his daughter.  He also developed some dizziness with it.  He denies any headache, extremity weakness, dysphagia or changes in cognition.  Patient had a similar episode at the beginning along, seen at [facility] for this.  CT brain was negative at that time he was discharged home.  Patient states has had similar episodes to this over the years.  He has seen neurology in the past and they do not feel that this is stroke related.  Patient also had cervical fusion surgery in mid October.  He states since the surgery has had pain in the back of his neck that radiates down his spine.  He also complains of some numbness in the anterior left side of his neck.  He denies any numbness in his extremities.  He denies any injury.  He denies any fever or chills.  CBC unremarkable.  Glucose 188.  Alk phos 142.  Troponin 8.  CT head and CTA head and neck showed no acute intracranial abnormality however did show chronic lacunar infarcts of the right anterior parietal white matter and mild periventricular and subcortical chronic microvascular ischemic changes.  CT of the C-spine showed no acute fracture or listhesis, mild postsurgical prevertebral soft tissue swelling, no evidence of hematoma or abscess, unremarkable postsurgical appearance from C3-C7 ACDF, and congenital cervical spinal canal stenosis C3-C6.    Past Medical History:        Diagnosis Date    Arthritis     Cellulitis     CHF (congestive heart failure) (HCC)     Depression     Diabetes mellitus (HCC)     Hypertension     Sleep apnea     uses a cpap    TIA

## 2024-11-15 NOTE — PROGRESS NOTES
Comprehensive Nutrition Assessment    Type and Reason for Visit:  Initial    Nutrition Recommendations/Plan:   Modify diet to include ground meat with gravy.      Malnutrition Assessment:  Malnutrition Status:  No malnutrition (11/15/24 0907)    Context:  Acute Illness     Findings of the 6 clinical characteristics of malnutrition:  Energy Intake:  No decrease in energy intake  Weight Loss:  No weight loss     Body Fat Loss:  No body fat loss     Muscle Mass Loss:  No muscle mass loss    Fluid Accumulation:  No fluid accumulation     Strength:  Not Performed    Nutrition Assessment:    Altered nutrition related labs r/t endocrine dysfunction aeb A1c 9.2 (2023). Pt reports he is less active with neck pain, walking less. States he does not count carbs, likes sweets. Discouraged Pt from bringing tempting foods into the home. Encouraged moderation, measuring foods. Pt snacks on ice cream at night. States some difficulty swallowing meats, needs ground meat with gravy, will modify order. Provided basic CC diet instruction materials.    Nutrition Related Findings:    no edema, active bowel sounds Wound Type: None       Current Nutrition Intake & Therapies:    Average Meal Intake: Unable to assess  Average Supplements Intake: None Ordered  ADULT DIET; Regular; 4 carb choices (60 gm/meal)    Anthropometric Measures:  Height: 180.3 cm (5' 11\")  Ideal Body Weight (IBW): 172 lbs (78 kg)    Admission Body Weight: 106.6 kg (235 lb 0.2 oz)  Current Body Weight: 106.6 kg (235 lb 0.2 oz), 136.6 % IBW. Weight Source: Bed scale  Current BMI (kg/m2): 32.8  Usual Body Weight: 95.3 kg (210 lb)     % Weight Change (Calculated): 11.9  Weight Adjustment For: No Adjustment                 BMI Categories: Obese Class 1 (BMI 30.0-34.9)    Estimated Daily Nutrient Needs:  Energy Requirements Based On: Kcal/kg  Weight Used for Energy Requirements: Current  Energy (kcal/day): 6808-7415 (15-18/kg)  Weight Used for Protein Requirements:

## 2024-11-15 NOTE — ED PROVIDER NOTES
[facility]     Pt Name: Sanya Rodriguez  MRN: [id number]  Birthdate 1965  Date of evaluation: 11/14/2024  Physician: Tyler Gan MD      Note to patient: The 21st Century Cures Act requires that medical notes like this one to be available to patients in the interest of transparency. Please be advised, this is a medical document. It is intended as [id number] communication. It is written in medical language and may contain abbreviations and verbiage that may be unfamiliar. It may appear to read blunt or direct. Medical documents are intended to carry relevant medical information, facts as evident and the clinical opinion of the practitioner.     CHIEF COMPLAINT       Chief Complaint   Patient presents with    Extremity Weakness     Patient has history of TIA, slurred speech notes, chronic neck pain and spine.      HISTORY OF PRESENT ILLNESS   Sanya Rodriguez is a 59 y.o. male with PMHx of spinal  who presents to the emergency department for evaluation of dizziness and slurred speech.  Patient arrives by EMS.  Patient states that he was driving to [place] to  his daughter.  States around 5 or 5:30 PM he noticed that he developed some dizziness.  On arrival they reported that his speech appeared slurred and EMS was called.    .  Patient was seen at [facility] on 11/1/2024 for slurred speech.  Patient also had a C3-C7 anterior cervical fusion with discectomy at [facility] on 1017    The patient has no other acute complaints at this time.  PASTMEDICAL HISTORY     Past Medical History:   Diagnosis Date    Arthritis     Cellulitis     CHF (congestive heart failure) ([facility])     Depression     Diabetes mellitus ([facility])     Hypertension     Sleep apnea     uses a cpap    TIA (transient ischemic attack)        Patient Active Problem List   Diagnosis Code    NUNO (acute kidney injury) ([facility]) N17.9    Intractable abdominal pain R10.9    Nausea R11.0    Hyperglycemia R73.9    Elevated beta-hydroxybutyrate  188 (*)     BUN 6 (*)     BUN/Creatinine Ratio 8 (*)     Alkaline Phosphatase 142 (*)     All other components within normal limits   GLUCOSE, WHOLE BLOOD - Abnormal; Notable for the following components:    POC Glucose 194 (*)     All other components within normal limits   GLUCOSE, WHOLE BLOOD - Abnormal; Notable for the following components:    POC Glucose 174 (*)     All other components within normal limits   TROPONIN   PROTIME-INR   BASIC METABOLIC PANEL W/ REFLEX TO MG FOR LOW K   CBC WITH AUTO DIFFERENTIAL   POCT GLUCOSE   POCT GLUCOSE   POCT GLUCOSE       (Any cultures that may have been sent were not resulted at the time of this patient visit; negative COVID-19 test should be interpreted as COVID no longer suspected unless otherwise noted in this encounter documentation note)    MEDICAL DECISION MAKING / ED COURSE:     ED Course as of 11/15/24 0311   Thu Nov 14, 2024   2100 Last known well was between 5 and 5:30 PM [TM]   2106 POC Glucose(!): 194 [TM]   2115 Dr. Castellanos.  Spoke with neurology at [facility].  He is going to perform chart review and tele assessment [TM]   2128 EKG is reviewed by myself.  It shows a sinus rhythm, normal MI, QRS, and QTc intervals.  No ectopy and no acute ischemic changes.  No atrial fibrillation   [TM]   2136 CTA HEAD NECK W CONTRAST  IMPRESSION:  1. No acute intracranial abnormality.  Chronic lacunar infarct of the right  anterior trigonal white matter.  2. Mild periventricular and subcortical chronic microvascular ischemic  changes.  3. Unremarkable CTA of the head and neck.   [TM]   2148 Dr. Castellanos. Since cervical fusion patient has developed bulbar symptoms [TM]   2150 MRI brain without and MRI c-spine w/w-out [TM]   2235 Spoke with and he is agreeable to admission.  Spoke with inpatient team.  They have excepted admission [TM]   2254 CT CERVICAL SPINE WO CONTRAST  IMPRESSION:  1. No acute fracture or listhesis.  2. Mild postsurgical prevertebral soft tissue swelling.  No

## 2024-11-15 NOTE — CARE COORDINATION
11/15/24 1107   Readmission Assessment   Number of Days since last admission? 8-30 days   Previous Disposition Home Alone   Who is being Interviewed Patient   What was the patient's/caregiver's perception as to why they think they needed to return back to the hospital? Other (Comment)  (Return to hospital with stroke-like symptoms.)   Did you visit your Primary Care Physician after you left the hospital, before you returned this time? Yes   Did you see a specialist, such as Cardiac, Pulmonary, Orthopedic Physician, etc. after you left the hospital? Other (Comment)  (ER visits.)   Who advised the patient to return to the hospital? Self-referral   Does the patient report anything that got in the way of taking their medications? No   In our efforts to provide the best possible care to you and others like you, can you think of anything that we could have done to help you after you left the hospital the first time, so that you might not have needed to return so soon? Other (Comment)  (Nothing could have been done differently.)       
Name:       The Patient and/or Patient Representative Agree with the Discharge Plan?  Yes    Renzo Arredondo RN  Case Management Department  Ph: [phone] Fax: [phone]

## 2024-11-15 NOTE — PROGRESS NOTES
Patient admitted to room 320 [facility] at this time. Patient ambulated from wheelchair to bed with no issues. Patient is alert and oriented x4. Vitals and assessment completed along with navigator and medication list. Patient states that his speech feels like it is back to normal now. Patients speech sounds clear to writer at this time as well. Patient denies dizziness/lightheadedness. Patient does states that he feels weak in his arms and legs. Patient rates pain 7/10 to his lower back and his neck-See MAR for PRN medication given. Patient given a snack. Patient states no further needs at this time, call light is in reach, plan of care is ongoing.

## 2024-11-15 NOTE — PROGRESS NOTES
Spiritual Services Interventions  0320/0320-01   11/15/2024  Ja Bowser    Sanya Jeffriesy  59 y.o. year old male    Encounter Summary  Encounter Overview/Reason: (P) Spiritual/Emotional Needs  Service Provided For: (P) Patient  Referral/Consult From: (P) Nurse  Support System: (P) Family members  Last Encounter : (P) 11/15/24  Complexity of Encounter: (P) Moderate  Begin Time: (P) 1400  End Time : (P) 1430  Total Time Calculated: (P) 30 min     Spiritual/Emotional needs  Type: (P) Spiritual Support                    Assessment/Intervention/Outcome  Assessment: (P) Anxious, Stress overload, Social isolation  Intervention: (P) Confronted/Challenged, Discussed belief system/Latter day practices/avis, Prayer (assurance of)/Spring, Sustaining Presence/Ministry of presence, Read/Provided Scripture  Outcome: (P) Encouraged, Engaged in conversation, Comfort, Receptive

## 2024-11-15 NOTE — PROGRESS NOTES
Physical Therapy  Facility/Department: [facility] MED SURG  Physical Therapy Initial Assessment    Name: Sanya Rodriguez  : 1965  MRN: [id number]  Date of Service: 11/15/2024    Discharge Recommendations:  Home independently          Patient Diagnosis(es): The encounter diagnosis was Slurred speech.  Past Medical History:  has a past medical history of Arthritis, Cellulitis, CHF (congestive heart failure) (HCC), Depression, Diabetes mellitus (HCC), Hypertension, Sleep apnea, and TIA (transient ischemic attack).  Past Surgical History:  has a past surgical history that includes Colon surgery () and cervical fusion (N/A, 10/11/2024).    Assessment  Assessment: Patient is 59 year old male with dx of slurred speech who presents as safe and IND with all mobility. No further PT required at this time.  Therapy Prognosis: Good  Decision Making: Medium Complexity  Requires PT Follow-Up: No  Activity Tolerance  Activity Tolerance: Patient tolerated evaluation without incident    Plan  Safety Devices  Type of Devices: All keaton prominences offloaded, All fall risk precautions in place, Left in bed, Bed alarm in place, Nurse notified, Call light within reach    Restrictions  Restrictions/Precautions  Restrictions/Precautions: Fall Risk, General Precautions     Subjective  General  Chart Reviewed: Yes  Patient assessed for rehabilitation services?: Yes  Response To Previous Treatment: Not applicable  Family / Caregiver Present: No  Referring Practitioner: Gabi Beal CNP  Referral Date : 24  Diagnosis: Slurred speech, R47.81  Follows Commands: Within Functional Limits  Subjective  Subjective: Pt reports low back and neck pain, agrees to PT eval         Social/Functional History  Social/Functional History  Lives With: Alone  Type of Home: Apartment  Home Layout: One level  Home Access: Level entry  Home Equipment: Walker - 4-Wheeled  Has the patient had two or more falls in the past year or any fall with injury in the

## 2024-11-15 NOTE — VIRTUAL HEALTH
Kevon Thorne [facility] Stroke and Telestroke Consult for  Parisa Stroke Alert through [facility] @ 9:11PM  11/14/2024 9:23 PM    Pt Name: Sanya Rodriguez  MRN: [id number]  YOB: 1965  Date of evaluation: 11/14/2024  Primary Care Physician: Gracia Fatima MD  Reason for Evaluation: Stroke Evaluation with Discussion with Ed or primary team with Telemedicine and stroke evaluation with Review of imaging and labs    Sanya Rodriguez is a 59 y.o. male who presents with acute onset dysarthria. PMH significant for HTN, HLD, DM2, CHF, JOSEPHINE, previous TIA, depression  and OA. Patient does have surgical history of recent C3-7 ACDF 10/11/2024. Patient states that since his recent cervical fusion with orthopedic surgery at [facility] he has had progressive bulbar weakness with swallowing difficulty and speech dysarthria and hypophonia. This has been progressive over the last about 1 month and now including bilateral arm weakness.     Vitals on arrival normotensive 121/83, HR 88 EKG confirmed NSR, RR 18 SPO2 97% on room air. Glucose 194, alk phos-142 otherwise CMP/CBC with dif/Troponin all WNL.     Home medications reviewed:   -lipitor 40  -gabapentin 300mg TID  -Effexor XR 150mg Daily  -Remeron 30mg nightly  -Losartan 25mg daily with norvasc 109  -lantus 18 units nightly and humalog ISS  -Pioglitazone 30mg daily and sitagliptin 100mg daily    LKW: intermittent symptoms progressive since 10/11/24   NIH STROKE SCALE:    Time Performed:  9:25PM      1a.  Level of consciousness:  0 - alert; keenly responsive  1b.  Level of consciousness questions:  0 - answers both questions correctly  1c.  Level of consciousness questions:  0 - performs both tasks correctly  2.    Best Gaze:  0 - normal  3.    Visual:  0 - no visual loss  4.    Facial Palsy:  0 - normal symmetric movement  5a.  Motor left arm:  0 - no drift, limb holds 90 (or 45) degrees for full 10 seconds  5b.  Motor right arm:  0 - no drift, limb holds 90 (or 45)

## 2024-11-15 NOTE — PROGRESS NOTES
Pharmacist Review and Automatic Dose Adjustment of prophylactic enoxaparin      The reviewing pharmacist has made an adjustment to the ordered enoxaparin dose or converted to UFH per the approved [facility] protocol and table as identified below.        Sanya Rodriguez is a 59 y.o. male.     Recent Labs     11/14/24  2045 11/15/24  0540   CREATININE 0.8 0.9       Estimated Creatinine Clearance: 107 mL/min (based on SCr of 0.9 mg/dL).    Height:   Ht Readings from Last 1 Encounters:   11/14/24 1.803 m (5' 11\")     Weight:  Wt Readings from Last 1 Encounters:   11/14/24 106.6 kg (235 lb 1.6 oz)         Enoxaparin Indication: DVT prophylaxis          Plan: Based upon renal function, the enoxaparin dose has been changed/converted to enoxaparin 30mg SQ BID for weight 101-150.9kg.      Thank you,  Deanna Cronin, [facility], 11/15/2024, 7:29 AM

## 2024-11-16 ENCOUNTER — HOSPITAL ENCOUNTER (INPATIENT)
Age: 59
LOS: 4 days | Discharge: HOME | DRG: 092 | End: 2024-11-20
Admitting: PHYSICIAN ASSISTANT
Payer: MEDICARE

## 2024-11-16 VITALS
SYSTOLIC BLOOD PRESSURE: 112 MMHG | BODY MASS INDEX: 33.52 KG/M2 | RESPIRATION RATE: 18 BRPM | HEART RATE: 78 BPM | DIASTOLIC BLOOD PRESSURE: 74 MMHG | OXYGEN SATURATION: 95 % | WEIGHT: 239.4 LBS | HEIGHT: 71 IN | TEMPERATURE: 96.9 F

## 2024-11-16 LAB
ALBUMIN SERPL BCG-MCNC: 3.7 G/DL (ref 3.5–5.1)
ALP SERPL-CCNC: 113 U/L (ref 38–126)
ALT SERPL W/O P-5'-P-CCNC: 20 U/L (ref 11–66)
ANION GAP SERPL CALC-SCNC: 10 MEQ/L (ref 8–16)
ANION GAP SERPL CALCULATED.3IONS-SCNC: 8 MMOL/L (ref 9–16)
AST SERPL-CCNC: 13 U/L (ref 5–40)
BASOPHILS # BLD: 0.03 K/UL (ref 0–0.2)
BASOPHILS ABSOLUTE: 0 THOU/MM3 (ref 0–0.1)
BASOPHILS NFR BLD AUTO: 0.6 %
BASOPHILS NFR BLD: 0 % (ref 0–2)
BILIRUB SERPL-MCNC: 0.2 MG/DL (ref 0.3–1.2)
BUN SERPL-MCNC: 8 MG/DL (ref 8–23)
BUN SERPL-MCNC: 9 MG/DL (ref 7–22)
BUN/CREAT SERPL: 9 (ref 9–20)
CALCIUM SERPL-MCNC: 8.7 MG/DL (ref 8.5–10.5)
CALCIUM SERPL-MCNC: 8.7 MG/DL (ref 8.6–10.4)
CHLORIDE SERPL-SCNC: 104 MEQ/L (ref 98–111)
CHLORIDE SERPL-SCNC: 104 MMOL/L (ref 98–107)
CO2 SERPL-SCNC: 27 MEQ/L (ref 23–33)
CO2 SERPL-SCNC: 28 MMOL/L (ref 20–31)
CREAT SERPL-MCNC: 0.8 MG/DL (ref 0.4–1.2)
CREAT SERPL-MCNC: 0.9 MG/DL (ref 0.7–1.2)
DEPRECATED RDW RBC AUTO: 45.4 FL (ref 35–45)
EOSINOPHIL # BLD: 0.16 K/UL (ref 0–0.44)
EOSINOPHIL NFR BLD AUTO: 1.9 %
EOSINOPHILS ABSOLUTE: 0.1 THOU/MM3 (ref 0–0.4)
EOSINOPHILS RELATIVE PERCENT: 2 % (ref 1–4)
ERYTHROCYTE [DISTWIDTH] IN BLOOD BY AUTOMATED COUNT: 13.6 % (ref 11.5–14.5)
ERYTHROCYTE [DISTWIDTH] IN BLOOD BY AUTOMATED COUNT: 13.7 % (ref 11.8–14.4)
GFR SERPL CREATININE-BSD FRML MDRD: > 90 ML/MIN/1.73M2
GFR, ESTIMATED: >90 ML/MIN/1.73M2
GLUCOSE BLD-MCNC: 116 MG/DL (ref 74–100)
GLUCOSE BLD-MCNC: 131 MG/DL (ref 74–100)
GLUCOSE BLD-MCNC: 146 MG/DL (ref 74–100)
GLUCOSE SERPL-MCNC: 110 MG/DL (ref 70–108)
GLUCOSE SERPL-MCNC: 119 MG/DL (ref 74–99)
HCT VFR BLD AUTO: 34.8 % (ref 40.7–50.3)
HCT VFR BLD AUTO: 37.6 % (ref 42–52)
HGB BLD-MCNC: 11.1 G/DL (ref 13–17)
HGB BLD-MCNC: 11.9 GM/DL (ref 14–18)
IMM GRANULOCYTES # BLD AUTO: 0.03 K/UL (ref 0–0.3)
IMM GRANULOCYTES # BLD AUTO: 0.03 THOU/MM3 (ref 0–0.07)
IMM GRANULOCYTES NFR BLD AUTO: 0.5 %
IMM GRANULOCYTES NFR BLD: 0 %
LYMPHOCYTES ABSOLUTE: 2.1 THOU/MM3 (ref 1–4.8)
LYMPHOCYTES NFR BLD AUTO: 32.6 %
LYMPHOCYTES NFR BLD: 2.7 K/UL (ref 1.1–3.7)
LYMPHOCYTES RELATIVE PERCENT: 33 % (ref 24–43)
MCH RBC QN AUTO: 28.5 PG (ref 25.2–33.5)
MCH RBC QN AUTO: 28.7 PG (ref 26–33)
MCHC RBC AUTO-ENTMCNC: 31.6 GM/DL (ref 32.2–35.5)
MCHC RBC AUTO-ENTMCNC: 31.9 G/DL (ref 28.4–34.8)
MCV RBC AUTO: 89.5 FL (ref 82.6–102.9)
MCV RBC AUTO: 90.6 FL (ref 80–94)
MONOCYTES ABSOLUTE: 0.4 THOU/MM3 (ref 0.4–1.3)
MONOCYTES NFR BLD AUTO: 6.5 %
MONOCYTES NFR BLD: 0.44 K/UL (ref 0.1–1.2)
MONOCYTES NFR BLD: 5 % (ref 3–12)
NEUTROPHILS ABSOLUTE: 3.6 THOU/MM3 (ref 1.8–7.7)
NEUTROPHILS NFR BLD AUTO: 57.9 %
NEUTROPHILS NFR BLD: 60 % (ref 36–65)
NEUTS SEG NFR BLD: 4.77 K/UL (ref 1.5–8.1)
NRBC BLD AUTO-RTO: 0 /100 WBC
NRBC BLD-RTO: 0 PER 100 WBC
PLATELET # BLD AUTO: 145 K/UL (ref 138–453)
PLATELET # BLD AUTO: 147 THOU/MM3 (ref 130–400)
PMV BLD AUTO: 10.2 FL (ref 9.4–12.4)
PMV BLD AUTO: 9.8 FL (ref 8.1–13.5)
POTASSIUM SERPL-SCNC: 3.7 MMOL/L (ref 3.7–5.3)
POTASSIUM SERPL-SCNC: 4.2 MEQ/L (ref 3.5–5.2)
PROT SERPL-MCNC: 6.2 G/DL (ref 6.1–8)
RBC # BLD AUTO: 3.89 M/UL (ref 4.21–5.77)
RBC # BLD AUTO: 4.15 MILL/MM3 (ref 4.7–6.1)
SODIUM SERPL-SCNC: 140 MMOL/L (ref 136–145)
SODIUM SERPL-SCNC: 141 MEQ/L (ref 135–145)
WBC # BLD AUTO: 6.3 THOU/MM3 (ref 4.8–10.8)
WBC OTHER # BLD: 8.1 K/UL (ref 3.5–11.3)

## 2024-11-16 PROCEDURE — 85025 COMPLETE CBC W/AUTO DIFF WBC: CPT

## 2024-11-16 PROCEDURE — 80048 BASIC METABOLIC PNL TOTAL CA: CPT

## 2024-11-16 PROCEDURE — 6360000002 HC RX W HCPCS: Performed by: INTERNAL MEDICINE

## 2024-11-16 PROCEDURE — 80053 COMPREHEN METABOLIC PANEL: CPT

## 2024-11-16 PROCEDURE — 1200000003 HC TELEMETRY R&B

## 2024-11-16 PROCEDURE — 82947 ASSAY GLUCOSE BLOOD QUANT: CPT

## 2024-11-16 PROCEDURE — 2580000003 HC RX 258: Performed by: PHYSICIAN ASSISTANT

## 2024-11-16 PROCEDURE — 2580000003 HC RX 258

## 2024-11-16 PROCEDURE — 6370000000 HC RX 637 (ALT 250 FOR IP)

## 2024-11-16 PROCEDURE — 6360000002 HC RX W HCPCS

## 2024-11-16 PROCEDURE — 6370000000 HC RX 637 (ALT 250 FOR IP): Performed by: PHYSICIAN ASSISTANT

## 2024-11-16 PROCEDURE — 94761 N-INVAS EAR/PLS OXIMETRY MLT: CPT

## 2024-11-16 PROCEDURE — 96372 THER/PROPH/DIAG INJ SC/IM: CPT

## 2024-11-16 PROCEDURE — 99222 1ST HOSP IP/OBS MODERATE 55: CPT | Performed by: PHYSICIAN ASSISTANT

## 2024-11-16 PROCEDURE — 6360000002 HC RX W HCPCS: Performed by: PHYSICIAN ASSISTANT

## 2024-11-16 PROCEDURE — G0378 HOSPITAL OBSERVATION PER HR: HCPCS

## 2024-11-16 PROCEDURE — 36415 COLL VENOUS BLD VENIPUNCTURE: CPT

## 2024-11-16 PROCEDURE — 96376 TX/PRO/DX INJ SAME DRUG ADON: CPT

## 2024-11-16 RX ORDER — ONDANSETRON 4 MG/1
4 TABLET, ORALLY DISINTEGRATING ORAL EVERY 8 HOURS PRN
Status: DISCONTINUED | OUTPATIENT
Start: 2024-11-16 | End: 2024-11-20 | Stop reason: HOSPADM

## 2024-11-16 RX ORDER — MIRTAZAPINE 30 MG/1
30 TABLET, FILM COATED ORAL NIGHTLY
Status: DISCONTINUED | OUTPATIENT
Start: 2024-11-16 | End: 2024-11-20 | Stop reason: HOSPADM

## 2024-11-16 RX ORDER — ENOXAPARIN SODIUM 100 MG/ML
30 INJECTION SUBCUTANEOUS 2 TIMES DAILY
Status: DISCONTINUED | OUTPATIENT
Start: 2024-11-16 | End: 2024-11-20 | Stop reason: HOSPADM

## 2024-11-16 RX ORDER — POLYETHYLENE GLYCOL 3350 17 G/17G
17 POWDER, FOR SOLUTION ORAL DAILY PRN
Status: DISCONTINUED | OUTPATIENT
Start: 2024-11-16 | End: 2024-11-20 | Stop reason: HOSPADM

## 2024-11-16 RX ORDER — AMLODIPINE BESYLATE 10 MG/1
10 TABLET ORAL DAILY
Status: DISCONTINUED | OUTPATIENT
Start: 2024-11-17 | End: 2024-11-20 | Stop reason: HOSPADM

## 2024-11-16 RX ORDER — MORPHINE SULFATE 4 MG/ML
4 INJECTION, SOLUTION INTRAMUSCULAR; INTRAVENOUS
Status: DISCONTINUED | OUTPATIENT
Start: 2024-11-16 | End: 2024-11-20 | Stop reason: HOSPADM

## 2024-11-16 RX ORDER — SODIUM CHLORIDE 0.9 % (FLUSH) 0.9 %
10 SYRINGE (ML) INJECTION PRN
Status: DISCONTINUED | OUTPATIENT
Start: 2024-11-16 | End: 2024-11-20 | Stop reason: HOSPADM

## 2024-11-16 RX ORDER — PANTOPRAZOLE SODIUM 40 MG/1
40 TABLET, DELAYED RELEASE ORAL NIGHTLY
Status: DISCONTINUED | OUTPATIENT
Start: 2024-11-16 | End: 2024-11-20 | Stop reason: HOSPADM

## 2024-11-16 RX ORDER — TRAMADOL HYDROCHLORIDE 50 MG/1
50 TABLET ORAL EVERY 6 HOURS PRN
Status: DISCONTINUED | OUTPATIENT
Start: 2024-11-16 | End: 2024-11-20 | Stop reason: HOSPADM

## 2024-11-16 RX ORDER — INSULIN GLARGINE 100 [IU]/ML
30 INJECTION, SOLUTION SUBCUTANEOUS NIGHTLY
Status: DISCONTINUED | OUTPATIENT
Start: 2024-11-16 | End: 2024-11-18

## 2024-11-16 RX ORDER — ACETAMINOPHEN 650 MG/1
650 SUPPOSITORY RECTAL EVERY 6 HOURS PRN
Status: DISCONTINUED | OUTPATIENT
Start: 2024-11-16 | End: 2024-11-20 | Stop reason: HOSPADM

## 2024-11-16 RX ORDER — ONDANSETRON 2 MG/ML
4 INJECTION INTRAMUSCULAR; INTRAVENOUS EVERY 6 HOURS PRN
Status: DISCONTINUED | OUTPATIENT
Start: 2024-11-16 | End: 2024-11-20 | Stop reason: HOSPADM

## 2024-11-16 RX ORDER — DEXTROSE MONOHYDRATE 100 MG/ML
INJECTION, SOLUTION INTRAVENOUS CONTINUOUS PRN
Status: DISCONTINUED | OUTPATIENT
Start: 2024-11-16 | End: 2024-11-20 | Stop reason: HOSPADM

## 2024-11-16 RX ORDER — POTASSIUM CHLORIDE 1500 MG/1
40 TABLET, EXTENDED RELEASE ORAL PRN
Status: DISCONTINUED | OUTPATIENT
Start: 2024-11-16 | End: 2024-11-20 | Stop reason: HOSPADM

## 2024-11-16 RX ORDER — GABAPENTIN 300 MG/1
300 CAPSULE ORAL 3 TIMES DAILY
Status: DISCONTINUED | OUTPATIENT
Start: 2024-11-16 | End: 2024-11-20 | Stop reason: HOSPADM

## 2024-11-16 RX ORDER — GLUCAGON 1 MG/ML
1 KIT INJECTION PRN
Status: DISCONTINUED | OUTPATIENT
Start: 2024-11-16 | End: 2024-11-20 | Stop reason: HOSPADM

## 2024-11-16 RX ORDER — SODIUM CHLORIDE 9 MG/ML
INJECTION, SOLUTION INTRAVENOUS PRN
Status: DISCONTINUED | OUTPATIENT
Start: 2024-11-16 | End: 2024-11-20 | Stop reason: HOSPADM

## 2024-11-16 RX ORDER — BUSPIRONE HYDROCHLORIDE 7.5 MG/1
15 TABLET ORAL 3 TIMES DAILY
Status: DISCONTINUED | OUTPATIENT
Start: 2024-11-16 | End: 2024-11-20 | Stop reason: HOSPADM

## 2024-11-16 RX ORDER — ALOGLIPTIN 25 MG/1
25 TABLET, FILM COATED ORAL DAILY
Status: DISCONTINUED | OUTPATIENT
Start: 2024-11-17 | End: 2024-11-17

## 2024-11-16 RX ORDER — POTASSIUM CHLORIDE 7.45 MG/ML
10 INJECTION INTRAVENOUS PRN
Status: DISCONTINUED | OUTPATIENT
Start: 2024-11-16 | End: 2024-11-20 | Stop reason: HOSPADM

## 2024-11-16 RX ORDER — ATORVASTATIN CALCIUM 40 MG/1
40 TABLET, FILM COATED ORAL NIGHTLY
Status: DISCONTINUED | OUTPATIENT
Start: 2024-11-16 | End: 2024-11-20 | Stop reason: HOSPADM

## 2024-11-16 RX ORDER — MORPHINE SULFATE 2 MG/ML
2 INJECTION, SOLUTION INTRAMUSCULAR; INTRAVENOUS
Status: DISCONTINUED | OUTPATIENT
Start: 2024-11-16 | End: 2024-11-20 | Stop reason: HOSPADM

## 2024-11-16 RX ORDER — LOSARTAN POTASSIUM 25 MG/1
25 TABLET ORAL DAILY
Status: DISCONTINUED | OUTPATIENT
Start: 2024-11-17 | End: 2024-11-20 | Stop reason: HOSPADM

## 2024-11-16 RX ORDER — MAGNESIUM SULFATE IN WATER 40 MG/ML
2000 INJECTION, SOLUTION INTRAVENOUS PRN
Status: DISCONTINUED | OUTPATIENT
Start: 2024-11-16 | End: 2024-11-20 | Stop reason: HOSPADM

## 2024-11-16 RX ORDER — ACETAMINOPHEN 325 MG/1
650 TABLET ORAL EVERY 6 HOURS PRN
Status: DISCONTINUED | OUTPATIENT
Start: 2024-11-16 | End: 2024-11-20 | Stop reason: HOSPADM

## 2024-11-16 RX ORDER — CLONAZEPAM 0.5 MG/1
0.5 TABLET ORAL 3 TIMES DAILY PRN
Status: DISCONTINUED | OUTPATIENT
Start: 2024-11-16 | End: 2024-11-20 | Stop reason: HOSPADM

## 2024-11-16 RX ORDER — SODIUM CHLORIDE 0.9 % (FLUSH) 0.9 %
10 SYRINGE (ML) INJECTION EVERY 12 HOURS SCHEDULED
Status: DISCONTINUED | OUTPATIENT
Start: 2024-11-16 | End: 2024-11-20 | Stop reason: HOSPADM

## 2024-11-16 RX ADMIN — VENLAFAXINE HYDROCHLORIDE 150 MG: 150 CAPSULE, EXTENDED RELEASE ORAL at 09:53

## 2024-11-16 RX ADMIN — GABAPENTIN 300 MG: 300 CAPSULE ORAL at 09:53

## 2024-11-16 RX ADMIN — BUSPIRONE HYDROCHLORIDE 15 MG: 5 TABLET ORAL at 14:52

## 2024-11-16 RX ADMIN — GABAPENTIN 300 MG: 300 CAPSULE ORAL at 14:52

## 2024-11-16 RX ADMIN — ATORVASTATIN CALCIUM 40 MG: 40 TABLET, FILM COATED ORAL at 22:13

## 2024-11-16 RX ADMIN — GABAPENTIN 300 MG: 300 CAPSULE ORAL at 22:13

## 2024-11-16 RX ADMIN — ENOXAPARIN SODIUM 30 MG: 100 INJECTION SUBCUTANEOUS at 22:14

## 2024-11-16 RX ADMIN — MORPHINE SULFATE 4 MG: 4 INJECTION, SOLUTION INTRAMUSCULAR; INTRAVENOUS at 22:13

## 2024-11-16 RX ADMIN — MORPHINE SULFATE 4 MG: 4 INJECTION, SOLUTION INTRAMUSCULAR; INTRAVENOUS at 14:51

## 2024-11-16 RX ADMIN — PIOGLITAZONE 30 MG: 15 TABLET ORAL at 09:53

## 2024-11-16 RX ADMIN — MIRTAZAPINE 30 MG: 30 TABLET, FILM COATED ORAL at 22:55

## 2024-11-16 RX ADMIN — SODIUM CHLORIDE, PRESERVATIVE FREE 10 ML: 5 INJECTION INTRAVENOUS at 22:16

## 2024-11-16 RX ADMIN — ENOXAPARIN SODIUM 30 MG: 100 INJECTION SUBCUTANEOUS at 09:53

## 2024-11-16 RX ADMIN — PANTOPRAZOLE SODIUM 40 MG: 40 TABLET, DELAYED RELEASE ORAL at 07:47

## 2024-11-16 RX ADMIN — MORPHINE SULFATE 4 MG: 4 INJECTION, SOLUTION INTRAMUSCULAR; INTRAVENOUS at 18:22

## 2024-11-16 RX ADMIN — ALOGLIPTIN 12.5 MG: 12.5 TABLET, FILM COATED ORAL at 10:48

## 2024-11-16 RX ADMIN — SODIUM CHLORIDE, PRESERVATIVE FREE 10 ML: 5 INJECTION INTRAVENOUS at 09:55

## 2024-11-16 RX ADMIN — INSULIN GLARGINE 30 UNITS: 100 INJECTION, SOLUTION SUBCUTANEOUS at 22:15

## 2024-11-16 RX ADMIN — BUSPIRONE HYDROCHLORIDE 15 MG: 5 TABLET ORAL at 09:52

## 2024-11-16 RX ADMIN — BUSPIRONE HYDROCHLORIDE 15 MG: 7.5 TABLET ORAL at 22:13

## 2024-11-16 RX ADMIN — PANTOPRAZOLE SODIUM 40 MG: 40 TABLET, DELAYED RELEASE ORAL at 22:15

## 2024-11-16 ASSESSMENT — PAIN DESCRIPTION - DESCRIPTORS
DESCRIPTORS: ACHING

## 2024-11-16 ASSESSMENT — PAIN DESCRIPTION - PAIN TYPE
TYPE: CHRONIC PAIN

## 2024-11-16 ASSESSMENT — PAIN DESCRIPTION - ONSET
ONSET: ON-GOING
ONSET: ON-GOING

## 2024-11-16 ASSESSMENT — PAIN DESCRIPTION - ORIENTATION
ORIENTATION: POSTERIOR;LOWER
ORIENTATION: POSTERIOR;LOWER
ORIENTATION: LOWER
ORIENTATION: LOWER;POSTERIOR
ORIENTATION: LOWER

## 2024-11-16 ASSESSMENT — PAIN SCALES - GENERAL
PAINLEVEL_OUTOF10: 4
PAINLEVEL_OUTOF10: 7
PAINLEVEL_OUTOF10: 5
PAINLEVEL_OUTOF10: 4
PAINLEVEL_OUTOF10: 8
PAINLEVEL_OUTOF10: 5
PAINLEVEL_OUTOF10: 8
PAINLEVEL_OUTOF10: 8

## 2024-11-16 ASSESSMENT — PAIN DESCRIPTION - LOCATION
LOCATION: BACK
LOCATION: BACK;NECK

## 2024-11-16 ASSESSMENT — PAIN - FUNCTIONAL ASSESSMENT
PAIN_FUNCTIONAL_ASSESSMENT: ACTIVITIES ARE NOT PREVENTED

## 2024-11-16 ASSESSMENT — PAIN DESCRIPTION - FREQUENCY
FREQUENCY: INTERMITTENT
FREQUENCY: INTERMITTENT

## 2024-11-16 ASSESSMENT — PAIN SCALES - WONG BAKER: WONGBAKER_NUMERICALRESPONSE: NO HURT

## 2024-11-16 NOTE — PROGRESS NOTES
[facility] arrived at this time and report given. Pt left with all belongings at this time and paperwork sent.

## 2024-11-16 NOTE — PLAN OF CARE
Problem: Chronic Conditions and Co-morbidities  Goal: Patient's chronic conditions and co-morbidity symptoms are monitored and maintained or improved  11/15/2024 2315 by Cassy Arias RN  Outcome: Progressing  Flowsheets (Taken 11/15/2024 2315)  Care Plan - Patient's Chronic Conditions and Co-Morbidity Symptoms are Monitored and Maintained or Improved: Monitor and assess patient's chronic conditions and comorbid symptoms for stability, deterioration, or improvement  11/15/2024 1259 by Adia Bowden RN  Outcome: Progressing     Problem: Discharge Planning  Goal: Discharge to home or other facility with appropriate resources  11/15/2024 2315 by Cassy Arias RN  Outcome: Progressing  Flowsheets (Taken 11/15/2024 2315)  Discharge to home or other facility with appropriate resources:   Identify barriers to discharge with patient and caregiver   Arrange for needed discharge resources and transportation as appropriate  11/15/2024 1259 by Adia Bowden RN  Outcome: Progressing     Problem: Safety - Adult  Goal: Free from fall injury  11/15/2024 2315 by Cassy Arias RN  Outcome: Progressing  11/15/2024 1259 by Adia Bowden RN  Outcome: Progressing     Problem: Pain  Goal: Verbalizes/displays adequate comfort level or baseline comfort level  11/15/2024 2315 by Cassy Arias RN  Outcome: Progressing  Flowsheets (Taken 11/15/2024 2315)  Verbalizes/displays adequate comfort level or baseline comfort level:   Encourage patient to monitor pain and request assistance   Assess pain using appropriate pain scale   Administer analgesics based on type and severity of pain and evaluate response   Implement non-pharmacological measures as appropriate and evaluate response  11/15/2024 1259 by Adia Bowden RN  Outcome: Progressing     Problem: Nutrition Deficit:  Goal: Optimize nutritional status  11/15/2024 2315 by Cassy Arias RN  Outcome: Progressing  Flowsheets (Taken 11/15/2024 2315)  Nutrient intake appropriate for

## 2024-11-16 NOTE — PROGRESS NOTES
Writer to bedside to complete morning assessment. Upon entry to room, pt lying in bed, eyes closed, easily awakened, respirations even and unlabored while on room air. Pt denies any pain at this time. Vitals obtained and assessment completed, see flow sheet for details. Pt denies needs from writer at this time. Call light in reach. Care ongoing.

## 2024-11-16 NOTE — PROGRESS NOTES
Received call from [employer], bed available at [facility]. Patient will go to room 7K, bed 9. Number to call report is [phone]. ETA for transportation is 6:30-7pm. Patient updated on all.

## 2024-11-16 NOTE — PROGRESS NOTES
Writer at bedside for shift assessment. PT A&O x3, vitals and assessment completed and charted. See flowsheets. PT reports pain at this time, PRN morphine provided. Call light in reach, chair wheels locked, care on-going.

## 2024-11-16 NOTE — PROGRESS NOTES
Irvin Jacobs M.D.  Internal Medicine Progress Note    Patient: Sanya Rodriguez  Date of Admission: 11/14/2024  8:55 PM  Date of Evaluation: 11/16/2024      SUBJECTIVE:    Sanya Rodriguez is a 59 y.o. male who was seen today for follow up of Dysarthria.  He  still c/oleft sided neck pain and a headache in both temples .  He is swallowing OK today.  Speech is fluent.  He denies any unilateral weakness, numbness or tingling in his extremities.    ROS:   Constitutional: negative  for fevers, and negative for chills.  Respiratory: negative for shortness of breath, negative for cough, and negative for wheezing  Cardiovascular: negative for chest pain, and negative for palpitations  Gastrointestinal: negative for abdominal pain, negative for nausea,negative for vomiting, negative for diarrhea, and negative for constipation     All other systems were reviewed with the patient and are negative unless otherwise stated in HPI    -----------------------------------------------------------------  OBJECTIVE:  Vitals:   Temp: 97.4 °F (36.3 °C)  BP: 104/60  Respirations: 18  Pulse: 76  SpO2: 94 % on room air    Weight  Wt Readings from Last 3 Encounters:   11/16/24 108.6 kg (239 lb 6.4 oz)   10/11/24 106.8 kg (235 lb 6.4 oz)   09/09/24 104.3 kg (230 lb)     Body mass index is 33.39 kg/m².    24HR INTAKE/OUTPUT:      Intake/Output Summary (Last 24 hours) at 11/16/2024 1312  Last data filed at 11/16/2024 0844  Gross per 24 hour   Intake 3290 ml   Output 4425 ml   Net -1135 ml       Exam:  GEN:    Awake, alert and oriented x 3.   EYES:  EOMI, pupils equal   ENT:  Posterior OP appears pink and moist  NECK: Supple. No lymphadenopathy.  No carotid bruit  CVS:    regular rate and rhythm, no audible murmur  PULM:  CTA, no wheezes, rales or rhonchi, no acute respiratory distress  ABD:    Bowels sounds normal.  Abdomen is soft.  No distention.  no tenderness to palpation.   EXT:   no edema bilaterally .  No calf tenderness.   SKIN:  No  flavum results in mild spinal canal stenosis.  Uncovertebral overgrowth and facet arthropathy results in severe right neural foraminal narrowing.  No significant left neural foraminal narrowing. C4-C5: Posterior osteophyte formation and thickening of the ligamentum flavum result in mild spinal canal stenosis.  Facet arthropathy and uncovertebral overgrowth result in moderate right and mild left neural foraminal narrowing. C5-C6: Uncovertebral overgrowth results in moderate right and mild left neural foraminal narrowing.  No spinal canal stenosis. C6-C7: Uncovertebral overgrowth results in mild bilateral neural foraminal narrowing.  No spinal canal stenosis. C7-T1: There is no disc bulge or protrusion present.  There is no significant spinal canal stenosis or neural foraminal narrowing present.     1. Status post anterior cervical discectomy and fusion from C3-C7. 2. Mild spinal canal stenosis at C3-C4 and C4-C5. 3. Multilevel neural foraminal narrowing as described above.     CT CERVICAL SPINE WO CONTRAST    Result Date: 11/14/2024  EXAMINATION: CT OF THE CERVICAL SPINE WITHOUT CONTRAST 11/14/2024 10:17 pm TECHNIQUE: CT of the cervical spine was performed without the administration of intravenous contrast. Multiplanar reformatted images are provided for review. Automated exposure control, iterative reconstruction, and/or weight based adjustment of the mA/kV was utilized to reduce the radiation dose to as low as reasonably achievable. COMPARISON: None. HISTORY: ORDERING SYSTEM PROVIDED HISTORY: bulbar weakness; slurred speech recent fusion TECHNOLOGIST PROVIDED HISTORY: bulbar weakness; slurred speech recent fusion Decision Support Exception - unselect if not a suspected or confirmed emergency medical condition->Emergency Medical Condition (MA) FINDINGS: BONES/ALIGNMENT: There is no acute fracture or traumatic malalignment. 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 Postsurgical sequela

## 2024-11-16 NOTE — PROGRESS NOTES
Morphine 4mg admin at this time per patient request for c/o pain of 8/10 to lower back and neck. Diet [place] and pretzels provided per request. Patient has ambulated independently to and from bathroom without difficulty. Denies further needs at this time. Patient sitting up in chair watching tv, call light in reach. Updated on plan of care. Aware of no further update from [facility] at this time.   Dr. Jacobs rounded, no new orders received.

## 2024-11-17 LAB
ANION GAP SERPL CALC-SCNC: 8 MEQ/L (ref 8–16)
BUN SERPL-MCNC: 10 MG/DL (ref 7–22)
CALCIUM SERPL-MCNC: 8.8 MG/DL (ref 8.5–10.5)
CHLORIDE SERPL-SCNC: 105 MEQ/L (ref 98–111)
CO2 SERPL-SCNC: 28 MEQ/L (ref 23–33)
CREAT SERPL-MCNC: 0.8 MG/DL (ref 0.4–1.2)
DEPRECATED RDW RBC AUTO: 44.4 FL (ref 35–45)
ERYTHROCYTE [DISTWIDTH] IN BLOOD BY AUTOMATED COUNT: 13.5 % (ref 11.5–14.5)
GFR SERPL CREATININE-BSD FRML MDRD: > 90 ML/MIN/1.73M2
GLUCOSE BLD STRIP.AUTO-MCNC: 241 MG/DL (ref 70–108)
GLUCOSE BLD STRIP.AUTO-MCNC: 247 MG/DL (ref 70–108)
GLUCOSE BLD STRIP.AUTO-MCNC: 82 MG/DL (ref 70–108)
GLUCOSE BLD STRIP.AUTO-MCNC: 94 MG/DL (ref 70–108)
GLUCOSE SERPL-MCNC: 84 MG/DL (ref 70–108)
HCT VFR BLD AUTO: 35.1 % (ref 42–52)
HGB BLD-MCNC: 11.2 GM/DL (ref 14–18)
MCH RBC QN AUTO: 28.6 PG (ref 26–33)
MCHC RBC AUTO-ENTMCNC: 31.9 GM/DL (ref 32.2–35.5)
MCV RBC AUTO: 89.5 FL (ref 80–94)
PLATELET # BLD AUTO: 125 THOU/MM3 (ref 130–400)
PMV BLD AUTO: 10.1 FL (ref 9.4–12.4)
POTASSIUM SERPL-SCNC: 4 MEQ/L (ref 3.5–5.2)
RBC # BLD AUTO: 3.92 MILL/MM3 (ref 4.7–6.1)
SODIUM SERPL-SCNC: 141 MEQ/L (ref 135–145)
WBC # BLD AUTO: 6.4 THOU/MM3 (ref 4.8–10.8)

## 2024-11-17 PROCEDURE — 82948 REAGENT STRIP/BLOOD GLUCOSE: CPT

## 2024-11-17 PROCEDURE — 6360000002 HC RX W HCPCS: Performed by: PHYSICIAN ASSISTANT

## 2024-11-17 PROCEDURE — 92526 ORAL FUNCTION THERAPY: CPT

## 2024-11-17 PROCEDURE — 99233 SBSQ HOSP IP/OBS HIGH 50: CPT | Performed by: STUDENT IN AN ORGANIZED HEALTH CARE EDUCATION/TRAINING PROGRAM

## 2024-11-17 PROCEDURE — 92610 EVALUATE SWALLOWING FUNCTION: CPT

## 2024-11-17 PROCEDURE — 6370000000 HC RX 637 (ALT 250 FOR IP): Performed by: PHYSICIAN ASSISTANT

## 2024-11-17 PROCEDURE — 1200000003 HC TELEMETRY R&B

## 2024-11-17 PROCEDURE — 36415 COLL VENOUS BLD VENIPUNCTURE: CPT

## 2024-11-17 PROCEDURE — 85027 COMPLETE CBC AUTOMATED: CPT

## 2024-11-17 PROCEDURE — 2580000003 HC RX 258: Performed by: PHYSICIAN ASSISTANT

## 2024-11-17 PROCEDURE — 80048 BASIC METABOLIC PNL TOTAL CA: CPT

## 2024-11-17 RX ORDER — INSULIN LISPRO 100 [IU]/ML
0-8 INJECTION, SOLUTION INTRAVENOUS; SUBCUTANEOUS
Status: DISCONTINUED | OUTPATIENT
Start: 2024-11-17 | End: 2024-11-20 | Stop reason: HOSPADM

## 2024-11-17 RX ORDER — DEXAMETHASONE SODIUM PHOSPHATE 4 MG/ML
8 INJECTION, SOLUTION INTRA-ARTICULAR; INTRALESIONAL; INTRAMUSCULAR; INTRAVENOUS; SOFT TISSUE EVERY 8 HOURS
Status: COMPLETED | OUTPATIENT
Start: 2024-11-17 | End: 2024-11-18

## 2024-11-17 RX ADMIN — TRAMADOL HYDROCHLORIDE 50 MG: 50 TABLET ORAL at 08:08

## 2024-11-17 RX ADMIN — ATORVASTATIN CALCIUM 40 MG: 40 TABLET, FILM COATED ORAL at 19:58

## 2024-11-17 RX ADMIN — BUSPIRONE HYDROCHLORIDE 15 MG: 7.5 TABLET ORAL at 16:06

## 2024-11-17 RX ADMIN — ENOXAPARIN SODIUM 30 MG: 100 INJECTION SUBCUTANEOUS at 19:57

## 2024-11-17 RX ADMIN — PANTOPRAZOLE SODIUM 40 MG: 40 TABLET, DELAYED RELEASE ORAL at 19:58

## 2024-11-17 RX ADMIN — MORPHINE SULFATE 4 MG: 4 INJECTION, SOLUTION INTRAMUSCULAR; INTRAVENOUS at 12:09

## 2024-11-17 RX ADMIN — TRAMADOL HYDROCHLORIDE 50 MG: 50 TABLET ORAL at 16:06

## 2024-11-17 RX ADMIN — SODIUM CHLORIDE, PRESERVATIVE FREE 10 ML: 5 INJECTION INTRAVENOUS at 08:07

## 2024-11-17 RX ADMIN — GABAPENTIN 300 MG: 300 CAPSULE ORAL at 08:08

## 2024-11-17 RX ADMIN — GABAPENTIN 300 MG: 300 CAPSULE ORAL at 16:06

## 2024-11-17 RX ADMIN — INSULIN GLARGINE 30 UNITS: 100 INJECTION, SOLUTION SUBCUTANEOUS at 20:16

## 2024-11-17 RX ADMIN — LOSARTAN POTASSIUM 25 MG: 25 TABLET, FILM COATED ORAL at 08:08

## 2024-11-17 RX ADMIN — INSULIN LISPRO 2 UNITS: 100 INJECTION, SOLUTION INTRAVENOUS; SUBCUTANEOUS at 20:11

## 2024-11-17 RX ADMIN — ENOXAPARIN SODIUM 30 MG: 100 INJECTION SUBCUTANEOUS at 08:08

## 2024-11-17 RX ADMIN — BUSPIRONE HYDROCHLORIDE 15 MG: 7.5 TABLET ORAL at 19:58

## 2024-11-17 RX ADMIN — MIRTAZAPINE 30 MG: 30 TABLET, FILM COATED ORAL at 19:58

## 2024-11-17 RX ADMIN — DEXAMETHASONE SODIUM PHOSPHATE 8 MG: 4 INJECTION, SOLUTION INTRA-ARTICULAR; INTRALESIONAL; INTRAMUSCULAR; INTRAVENOUS; SOFT TISSUE at 19:58

## 2024-11-17 RX ADMIN — MORPHINE SULFATE 4 MG: 4 INJECTION, SOLUTION INTRAMUSCULAR; INTRAVENOUS at 19:58

## 2024-11-17 RX ADMIN — DEXAMETHASONE SODIUM PHOSPHATE 8 MG: 4 INJECTION, SOLUTION INTRA-ARTICULAR; INTRALESIONAL; INTRAMUSCULAR; INTRAVENOUS; SOFT TISSUE at 12:04

## 2024-11-17 RX ADMIN — BUSPIRONE HYDROCHLORIDE 15 MG: 7.5 TABLET ORAL at 08:08

## 2024-11-17 RX ADMIN — SODIUM CHLORIDE, PRESERVATIVE FREE 10 ML: 5 INJECTION INTRAVENOUS at 19:58

## 2024-11-17 RX ADMIN — INSULIN LISPRO 2 UNITS: 100 INJECTION, SOLUTION INTRAVENOUS; SUBCUTANEOUS at 16:31

## 2024-11-17 RX ADMIN — GABAPENTIN 300 MG: 300 CAPSULE ORAL at 19:58

## 2024-11-17 RX ADMIN — AMLODIPINE BESYLATE 10 MG: 10 TABLET ORAL at 08:08

## 2024-11-17 ASSESSMENT — PAIN DESCRIPTION - FREQUENCY: FREQUENCY: CONTINUOUS

## 2024-11-17 ASSESSMENT — PAIN DESCRIPTION - LOCATION
LOCATION: NECK
LOCATION: NECK;BACK
LOCATION: BACK;NECK

## 2024-11-17 ASSESSMENT — PAIN DESCRIPTION - ORIENTATION
ORIENTATION: POSTERIOR;LOWER
ORIENTATION: LEFT;LOWER
ORIENTATION: LEFT

## 2024-11-17 ASSESSMENT — PAIN SCALES - GENERAL
PAINLEVEL_OUTOF10: 8

## 2024-11-17 ASSESSMENT — PAIN DESCRIPTION - DESCRIPTORS
DESCRIPTORS: ACHING

## 2024-11-17 NOTE — PLAN OF CARE
Problem: Chronic Conditions and Co-morbidities  Goal: Patient's chronic conditions and co-morbidity symptoms are monitored and maintained or improved  11/17/2024 0905 by Judith Bustillos RN  Outcome: Progressing  Flowsheets (Taken 11/17/2024 0905)  Care Plan - Patient's Chronic Conditions and Co-Morbidity Symptoms are Monitored and Maintained or Improved: Monitor and assess patient's chronic conditions and comorbid symptoms for stability, deterioration, or improvement     Problem: Discharge Planning  Goal: Discharge to home or other facility with appropriate resources  11/17/2024 0905 by Judith Bustillos RN  Outcome: Progressing  Flowsheets (Taken 11/17/2024 0905)  Discharge to home or other facility with appropriate resources: Identify barriers to discharge with patient and caregiver     Problem: Pain  Goal: Verbalizes/displays adequate comfort level or baseline comfort level  11/17/2024 0905 by Judith Bustillos RN  Outcome: Progressing  Flowsheets (Taken 11/17/2024 0905)  Verbalizes/displays adequate comfort level or baseline comfort level:   Encourage patient to monitor pain and request assistance   Administer analgesics based on type and severity of pain and evaluate response   Assess pain using appropriate pain scale   Implement non-pharmacological measures as appropriate and evaluate response     Problem: Metabolic/Fluid and Electrolytes - Adult  Goal: Electrolytes maintained within normal limits  11/17/2024 0905 by Judith Bustillos RN  Outcome: Progressing  Flowsheets (Taken 11/17/2024 0905)  Electrolytes maintained within normal limits: Monitor labs and assess patient for signs and symptoms of electrolyte imbalances     Problem: Metabolic/Fluid and Electrolytes - Adult  Goal: Glucose maintained within prescribed range  11/17/2024 0905 by Judith Bustillos RN  Outcome: Progressing  Flowsheets (Taken 11/17/2024 0905)  Glucose maintained within prescribed range: Monitor blood glucose as ordered

## 2024-11-17 NOTE — PLAN OF CARE
Problem: Chronic Conditions and Co-morbidities  Goal: Patient's chronic conditions and co-morbidity symptoms are monitored and maintained or improved  Outcome: Progressing     Problem: Discharge Planning  Goal: Discharge to home or other facility with appropriate resources  Outcome: Progressing     Problem: Pain  Goal: Verbalizes/displays adequate comfort level or baseline comfort level  Outcome: Progressing     Problem: Metabolic/Fluid and Electrolytes - Adult  Goal: Electrolytes maintained within normal limits  Outcome: Progressing     Problem: Metabolic/Fluid and Electrolytes - Adult  Goal: Glucose maintained within prescribed range  Outcome: Progressing

## 2024-11-17 NOTE — CARE COORDINATION
11/17/24 1219   Service Assessment   Patient Orientation Alert and Oriented   Cognition Alert   History Provided By Patient   Primary Caregiver Self   Accompanied By/Relationship n/a   Support Systems Family Members;Friends/Neighbors   Patient's Healthcare Decision Maker is: Patient Declined (Legal Next of Kin Remains as Decision Maker)   PCP Verified by CM Yes   Last Visit to PCP Within last 3 months   Prior Functional Level Independent in ADLs/IADLs   Current Functional Level Independent in ADLs/IADLs   Can patient return to prior living arrangement Yes   Ability to make needs known: Good   Family able to assist with home care needs: Yes   Would you like for me to discuss the discharge plan with any other family members/significant others, and if so, who? No   Financial Resources Medicare   Community Resources None   Social/Functional History   Active  Yes   Discharge Planning   Type of Residence Apartment   Living Arrangements Alone   Current Services Prior To Admission Durable Medical Equipment   Current DME Prior to Arrival Cane;Cpap  (grab bars in bathroom)   Potential Assistance Needed N/A  (denies needs)   DME Ordered? No   Potential Assistance Purchasing Medications No   Type of Home Care Services None   Patient expects to be discharged to: Apartment   Services At/After Discharge   Mode of Transport at Discharge Other (see comment)  (family)   Confirm Follow Up Transport Self       Patient Goals/Plan/Treatment Preferences: Sanya plans to return home to his apartment alone at discharge. He is on the wait list for Meals on Wheels. He drives, works and is independent with his cane. Denies needs.     If patient is discharged prior to next notation, then this note serves as note for discharge by case management.

## 2024-11-17 NOTE — CARE COORDINATION
11/17/24 1225   Readmission Assessment   Number of Days since last admission? 8-30 days   Previous Disposition Home Alone   Who is being Interviewed Patient   What was the patient's/caregiver's perception as to why they think they needed to return back to the hospital? Other (Comment)  (stroke-like symptoms)   Did you visit your Primary Care Physician after you left the hospital, before you returned this time? Yes   Did you see a specialist, such as Cardiac, Pulmonary, Orthopedic Physician, etc. after you left the hospital? No   Who advised the patient to return to the hospital? Self-referral   Does the patient report anything that got in the way of taking their medications? No   In our efforts to provide the best possible care to you and others like you, can you think of anything that we could have done to help you after you left the hospital the first time, so that you might not have needed to return so soon? Other (Comment)  (no)     CM team was unable to find HH in pt's service area taking his insurance plan last admission. He was kept in the hospital until drains were out.

## 2024-11-17 NOTE — H&P
Social Determinants of Health     Financial Resource Strain: Medium Risk (8/26/2024)    [facility]    Overall Financial Resource Strain (CARDIA)     Difficulty of Paying Living Expenses: Somewhat hard   Food Insecurity: No Food Insecurity (11/14/2024)    Hunger Vital Sign     Worried About Running Out of Food in the Last Year: Never true     Ran Out of Food in the Last Year: Never true   Transportation Needs: No Transportation Needs (11/14/2024)    PRAPARE - Transportation     Lack of Transportation (Medical): No     Lack of Transportation (Non-Medical): No   Physical Activity: Inactive (5/23/2024)    Received from [facility]    Exercise Vital Sign     Days of Exercise per Week: 0 days     Minutes of Exercise per Session: 0 min   Stress: Stress Concern Present (5/23/2024)    Received from [facility]    [employer] [place] - Occupational Stress Questionnaire     Feeling of Stress : To some extent   Social Connections: Moderately Isolated (5/23/2024)    Received from [facility]    Social Connection and Isolation Panel [NHANES]     Frequency of Communication with Friends and Family: Once a week     Frequency of Social Gatherings with Friends and Family: Never     Attends Jainism Services: 1 to 4 times per year     Active Member of Clubs or Organizations: Yes     Attends Club or Organization Meetings: 1 to 4 times per year     Marital Status:    Housing Stability: Low Risk  (11/14/2024)    Housing Stability Vital Sign     Unable to Pay for Housing in the Last Year: No     Number of Times Moved in the Last Year: 1     Homeless in the Last Year: No        Code status: Prior     Thank you Gracia Fatima MD for the opportunity to be involved in this patient's care.    Electronically signed by MOISÉS Flores on 11/16/2024 at 8:49 PM.

## 2024-11-18 ENCOUNTER — APPOINTMENT (OUTPATIENT)
Dept: MRI IMAGING | Age: 59
DRG: 092 | End: 2024-11-18
Payer: MEDICARE

## 2024-11-18 LAB
ANION GAP SERPL CALC-SCNC: 13 MEQ/L (ref 8–16)
BUN SERPL-MCNC: 12 MG/DL (ref 7–22)
CALCIUM SERPL-MCNC: 9.3 MG/DL (ref 8.5–10.5)
CHLORIDE SERPL-SCNC: 103 MEQ/L (ref 98–111)
CO2 SERPL-SCNC: 25 MEQ/L (ref 23–33)
CREAT SERPL-MCNC: 0.7 MG/DL (ref 0.4–1.2)
DEPRECATED RDW RBC AUTO: 40.8 FL (ref 35–45)
ERYTHROCYTE [DISTWIDTH] IN BLOOD BY AUTOMATED COUNT: 12.9 % (ref 11.5–14.5)
GFR SERPL CREATININE-BSD FRML MDRD: > 90 ML/MIN/1.73M2
GLUCOSE BLD STRIP.AUTO-MCNC: 231 MG/DL (ref 70–108)
GLUCOSE BLD STRIP.AUTO-MCNC: 232 MG/DL (ref 70–108)
GLUCOSE BLD STRIP.AUTO-MCNC: 238 MG/DL (ref 70–108)
GLUCOSE BLD STRIP.AUTO-MCNC: 238 MG/DL (ref 70–108)
GLUCOSE SERPL-MCNC: 239 MG/DL (ref 70–108)
HCT VFR BLD AUTO: 36.8 % (ref 42–52)
HGB BLD-MCNC: 12.1 GM/DL (ref 14–18)
MCH RBC QN AUTO: 28.7 PG (ref 26–33)
MCHC RBC AUTO-ENTMCNC: 32.9 GM/DL (ref 32.2–35.5)
MCV RBC AUTO: 87.2 FL (ref 80–94)
PLATELET # BLD AUTO: 129 THOU/MM3 (ref 130–400)
PMV BLD AUTO: 10.1 FL (ref 9.4–12.4)
POTASSIUM SERPL-SCNC: 4.3 MEQ/L (ref 3.5–5.2)
RBC # BLD AUTO: 4.22 MILL/MM3 (ref 4.7–6.1)
SODIUM SERPL-SCNC: 141 MEQ/L (ref 135–145)
WBC # BLD AUTO: 8.7 THOU/MM3 (ref 4.8–10.8)

## 2024-11-18 PROCEDURE — 6370000000 HC RX 637 (ALT 250 FOR IP): Performed by: PHYSICIAN ASSISTANT

## 2024-11-18 PROCEDURE — 6360000002 HC RX W HCPCS: Performed by: PHYSICIAN ASSISTANT

## 2024-11-18 PROCEDURE — 6370000000 HC RX 637 (ALT 250 FOR IP): Performed by: STUDENT IN AN ORGANIZED HEALTH CARE EDUCATION/TRAINING PROGRAM

## 2024-11-18 PROCEDURE — 99232 SBSQ HOSP IP/OBS MODERATE 35: CPT | Performed by: PHYSICIAN ASSISTANT

## 2024-11-18 PROCEDURE — 72148 MRI LUMBAR SPINE W/O DYE: CPT

## 2024-11-18 PROCEDURE — 82948 REAGENT STRIP/BLOOD GLUCOSE: CPT

## 2024-11-18 PROCEDURE — 80048 BASIC METABOLIC PNL TOTAL CA: CPT

## 2024-11-18 PROCEDURE — 1200000003 HC TELEMETRY R&B

## 2024-11-18 PROCEDURE — 2580000003 HC RX 258: Performed by: PHYSICIAN ASSISTANT

## 2024-11-18 PROCEDURE — 85027 COMPLETE CBC AUTOMATED: CPT

## 2024-11-18 PROCEDURE — 36415 COLL VENOUS BLD VENIPUNCTURE: CPT

## 2024-11-18 RX ORDER — INSULIN GLARGINE 100 [IU]/ML
40 INJECTION, SOLUTION SUBCUTANEOUS NIGHTLY
Status: DISCONTINUED | OUTPATIENT
Start: 2024-11-18 | End: 2024-11-20 | Stop reason: HOSPADM

## 2024-11-18 RX ORDER — LORAZEPAM 1 MG/1
1 TABLET ORAL ONCE
Status: DISCONTINUED | OUTPATIENT
Start: 2024-11-18 | End: 2024-11-18

## 2024-11-18 RX ORDER — DEXAMETHASONE SODIUM PHOSPHATE 4 MG/ML
6 INJECTION, SOLUTION INTRA-ARTICULAR; INTRALESIONAL; INTRAMUSCULAR; INTRAVENOUS; SOFT TISSUE EVERY 8 HOURS
Status: COMPLETED | OUTPATIENT
Start: 2024-11-18 | End: 2024-11-19

## 2024-11-18 RX ORDER — LORAZEPAM 2 MG/ML
1 INJECTION INTRAMUSCULAR ONCE
Status: COMPLETED | OUTPATIENT
Start: 2024-11-18 | End: 2024-11-18

## 2024-11-18 RX ADMIN — DEXAMETHASONE SODIUM PHOSPHATE 6 MG: 4 INJECTION, SOLUTION INTRA-ARTICULAR; INTRALESIONAL; INTRAMUSCULAR; INTRAVENOUS; SOFT TISSUE at 21:00

## 2024-11-18 RX ADMIN — SODIUM CHLORIDE, PRESERVATIVE FREE 10 ML: 5 INJECTION INTRAVENOUS at 21:01

## 2024-11-18 RX ADMIN — MIRTAZAPINE 30 MG: 30 TABLET, FILM COATED ORAL at 21:00

## 2024-11-18 RX ADMIN — MORPHINE SULFATE 4 MG: 4 INJECTION, SOLUTION INTRAMUSCULAR; INTRAVENOUS at 06:28

## 2024-11-18 RX ADMIN — MORPHINE SULFATE 4 MG: 4 INJECTION, SOLUTION INTRAMUSCULAR; INTRAVENOUS at 21:01

## 2024-11-18 RX ADMIN — TRAMADOL HYDROCHLORIDE 50 MG: 50 TABLET ORAL at 22:59

## 2024-11-18 RX ADMIN — GABAPENTIN 300 MG: 300 CAPSULE ORAL at 15:36

## 2024-11-18 RX ADMIN — INSULIN GLARGINE 40 UNITS: 100 INJECTION, SOLUTION SUBCUTANEOUS at 21:15

## 2024-11-18 RX ADMIN — INSULIN LISPRO 2 UNITS: 100 INJECTION, SOLUTION INTRAVENOUS; SUBCUTANEOUS at 21:15

## 2024-11-18 RX ADMIN — BUSPIRONE HYDROCHLORIDE 15 MG: 7.5 TABLET ORAL at 15:36

## 2024-11-18 RX ADMIN — LOSARTAN POTASSIUM 25 MG: 25 TABLET, FILM COATED ORAL at 08:48

## 2024-11-18 RX ADMIN — BUSPIRONE HYDROCHLORIDE 15 MG: 7.5 TABLET ORAL at 08:48

## 2024-11-18 RX ADMIN — INSULIN LISPRO 2 UNITS: 100 INJECTION, SOLUTION INTRAVENOUS; SUBCUTANEOUS at 17:18

## 2024-11-18 RX ADMIN — MORPHINE SULFATE 4 MG: 4 INJECTION, SOLUTION INTRAMUSCULAR; INTRAVENOUS at 15:36

## 2024-11-18 RX ADMIN — ENOXAPARIN SODIUM 30 MG: 100 INJECTION SUBCUTANEOUS at 21:00

## 2024-11-18 RX ADMIN — SODIUM CHLORIDE, PRESERVATIVE FREE 10 ML: 5 INJECTION INTRAVENOUS at 08:50

## 2024-11-18 RX ADMIN — AMLODIPINE BESYLATE 10 MG: 10 TABLET ORAL at 08:48

## 2024-11-18 RX ADMIN — PANTOPRAZOLE SODIUM 40 MG: 40 TABLET, DELAYED RELEASE ORAL at 21:00

## 2024-11-18 RX ADMIN — MORPHINE SULFATE 4 MG: 4 INJECTION, SOLUTION INTRAMUSCULAR; INTRAVENOUS at 08:48

## 2024-11-18 RX ADMIN — BUSPIRONE HYDROCHLORIDE 15 MG: 7.5 TABLET ORAL at 21:00

## 2024-11-18 RX ADMIN — ENOXAPARIN SODIUM 30 MG: 100 INJECTION SUBCUTANEOUS at 08:48

## 2024-11-18 RX ADMIN — GABAPENTIN 300 MG: 300 CAPSULE ORAL at 21:00

## 2024-11-18 RX ADMIN — DEXAMETHASONE SODIUM PHOSPHATE 6 MG: 4 INJECTION, SOLUTION INTRA-ARTICULAR; INTRALESIONAL; INTRAMUSCULAR; INTRAVENOUS; SOFT TISSUE at 12:15

## 2024-11-18 RX ADMIN — GABAPENTIN 300 MG: 300 CAPSULE ORAL at 08:48

## 2024-11-18 RX ADMIN — LORAZEPAM 1 MG: 2 INJECTION INTRAMUSCULAR; INTRAVENOUS at 14:39

## 2024-11-18 RX ADMIN — DEXAMETHASONE SODIUM PHOSPHATE 8 MG: 4 INJECTION, SOLUTION INTRA-ARTICULAR; INTRALESIONAL; INTRAMUSCULAR; INTRAVENOUS; SOFT TISSUE at 03:21

## 2024-11-18 RX ADMIN — INSULIN LISPRO 2 UNITS: 100 INJECTION, SOLUTION INTRAVENOUS; SUBCUTANEOUS at 06:28

## 2024-11-18 RX ADMIN — INSULIN LISPRO 2 UNITS: 100 INJECTION, SOLUTION INTRAVENOUS; SUBCUTANEOUS at 12:22

## 2024-11-18 RX ADMIN — ATORVASTATIN CALCIUM 40 MG: 40 TABLET, FILM COATED ORAL at 21:00

## 2024-11-18 RX ADMIN — MORPHINE SULFATE 4 MG: 4 INJECTION, SOLUTION INTRAMUSCULAR; INTRAVENOUS at 12:15

## 2024-11-18 ASSESSMENT — PAIN SCALES - GENERAL
PAINLEVEL_OUTOF10: 6
PAINLEVEL_OUTOF10: 8
PAINLEVEL_OUTOF10: 7
PAINLEVEL_OUTOF10: 7
PAINLEVEL_OUTOF10: 8

## 2024-11-18 ASSESSMENT — PAIN DESCRIPTION - FREQUENCY
FREQUENCY: CONTINUOUS
FREQUENCY: CONTINUOUS

## 2024-11-18 ASSESSMENT — PAIN DESCRIPTION - DESCRIPTORS
DESCRIPTORS: ACHING
DESCRIPTORS: ACHING

## 2024-11-18 ASSESSMENT — PAIN DESCRIPTION - PAIN TYPE
TYPE: CHRONIC PAIN
TYPE: CHRONIC PAIN

## 2024-11-18 ASSESSMENT — PAIN DESCRIPTION - ORIENTATION
ORIENTATION: LEFT
ORIENTATION: LEFT
ORIENTATION: LEFT;LOWER

## 2024-11-18 ASSESSMENT — PAIN DESCRIPTION - ONSET
ONSET: ON-GOING
ONSET: ON-GOING

## 2024-11-18 ASSESSMENT — PAIN DESCRIPTION - LOCATION
LOCATION: BACK

## 2024-11-18 ASSESSMENT — PAIN SCALES - WONG BAKER: WONGBAKER_NUMERICALRESPONSE: NO HURT

## 2024-11-18 NOTE — CARE COORDINATION
11/18/24, 3:25 PM EST    DISCHARGE ON GOING EVALUATION    [facility] day: 2  Location: -09/009-A Reason for admit: Dysarthria [R47.1]     Procedures: past OR 5 weeks ago s/p C3-7 ACDF with Dr. Ramires.     Imaging since last note:   MRI lumbar spine pending    Barriers to Discharge: ENT consulted, SLP follows. FEES study planned.     PCP: Gracia Fatima MD  Readmission Risk Score: 17.7    Patient Goals/Plan/Treatment Preferences:   Sanya plans to return home to his apartment alone at discharge; OP follow ups with ENT and PCP/ortho spine.

## 2024-11-19 ENCOUNTER — APPOINTMENT (OUTPATIENT)
Dept: ENDOSCOPY | Age: 59
DRG: 092 | End: 2024-11-19
Attending: INTERNAL MEDICINE
Payer: MEDICARE

## 2024-11-19 ENCOUNTER — ANESTHESIA (OUTPATIENT)
Dept: ENDOSCOPY | Age: 59
DRG: 092 | End: 2024-11-19
Payer: MEDICARE

## 2024-11-19 ENCOUNTER — ANESTHESIA EVENT (OUTPATIENT)
Dept: ENDOSCOPY | Age: 59
DRG: 092 | End: 2024-11-19
Payer: MEDICARE

## 2024-11-19 LAB
ANION GAP SERPL CALC-SCNC: 12 MEQ/L (ref 8–16)
BUN SERPL-MCNC: 16 MG/DL (ref 7–22)
CALCIUM SERPL-MCNC: 9.1 MG/DL (ref 8.5–10.5)
CHLORIDE SERPL-SCNC: 101 MEQ/L (ref 98–111)
CO2 SERPL-SCNC: 26 MEQ/L (ref 23–33)
CREAT SERPL-MCNC: 0.8 MG/DL (ref 0.4–1.2)
DEPRECATED RDW RBC AUTO: 41.2 FL (ref 35–45)
ERYTHROCYTE [DISTWIDTH] IN BLOOD BY AUTOMATED COUNT: 13.2 % (ref 11.5–14.5)
GFR SERPL CREATININE-BSD FRML MDRD: > 90 ML/MIN/1.73M2
GLUCOSE BLD STRIP.AUTO-MCNC: 192 MG/DL (ref 70–108)
GLUCOSE BLD STRIP.AUTO-MCNC: 216 MG/DL (ref 70–108)
GLUCOSE BLD STRIP.AUTO-MCNC: 240 MG/DL (ref 70–108)
GLUCOSE BLD STRIP.AUTO-MCNC: 269 MG/DL (ref 70–108)
GLUCOSE SERPL-MCNC: 274 MG/DL (ref 70–108)
HCT VFR BLD AUTO: 35.7 % (ref 42–52)
HGB BLD-MCNC: 12 GM/DL (ref 14–18)
MCH RBC QN AUTO: 29 PG (ref 26–33)
MCHC RBC AUTO-ENTMCNC: 33.6 GM/DL (ref 32.2–35.5)
MCV RBC AUTO: 86.2 FL (ref 80–94)
PLATELET # BLD AUTO: 126 THOU/MM3 (ref 130–400)
PMV BLD AUTO: 10.1 FL (ref 9.4–12.4)
POTASSIUM SERPL-SCNC: 4.3 MEQ/L (ref 3.5–5.2)
RBC # BLD AUTO: 4.14 MILL/MM3 (ref 4.7–6.1)
SODIUM SERPL-SCNC: 139 MEQ/L (ref 135–145)
WBC # BLD AUTO: 15.4 THOU/MM3 (ref 4.8–10.8)

## 2024-11-19 PROCEDURE — 2500000003 HC RX 250 WO HCPCS: Performed by: NURSE ANESTHETIST, CERTIFIED REGISTERED

## 2024-11-19 PROCEDURE — 80048 BASIC METABOLIC PNL TOTAL CA: CPT

## 2024-11-19 PROCEDURE — 99232 SBSQ HOSP IP/OBS MODERATE 35: CPT | Performed by: PHYSICIAN ASSISTANT

## 2024-11-19 PROCEDURE — 82948 REAGENT STRIP/BLOOD GLUCOSE: CPT

## 2024-11-19 PROCEDURE — 6360000002 HC RX W HCPCS: Performed by: PHYSICIAN ASSISTANT

## 2024-11-19 PROCEDURE — 7100000010 HC PHASE II RECOVERY - FIRST 15 MIN: Performed by: INTERNAL MEDICINE

## 2024-11-19 PROCEDURE — 88305 TISSUE EXAM BY PATHOLOGIST: CPT

## 2024-11-19 PROCEDURE — 6370000000 HC RX 637 (ALT 250 FOR IP): Performed by: PHYSICIAN ASSISTANT

## 2024-11-19 PROCEDURE — 3609017100 HC EGD: Performed by: INTERNAL MEDICINE

## 2024-11-19 PROCEDURE — 3700000001 HC ADD 15 MINUTES (ANESTHESIA): Performed by: INTERNAL MEDICINE

## 2024-11-19 PROCEDURE — 0D758ZZ DILATION OF ESOPHAGUS, VIA NATURAL OR ARTIFICIAL OPENING ENDOSCOPIC: ICD-10-PCS | Performed by: INTERNAL MEDICINE

## 2024-11-19 PROCEDURE — 2580000003 HC RX 258: Performed by: PHYSICIAN ASSISTANT

## 2024-11-19 PROCEDURE — 1200000003 HC TELEMETRY R&B

## 2024-11-19 PROCEDURE — 2580000003 HC RX 258: Performed by: NURSE ANESTHETIST, CERTIFIED REGISTERED

## 2024-11-19 PROCEDURE — 36415 COLL VENOUS BLD VENIPUNCTURE: CPT

## 2024-11-19 PROCEDURE — 3609012400 HC EGD TRANSORAL BIOPSY SINGLE/MULTIPLE: Performed by: INTERNAL MEDICINE

## 2024-11-19 PROCEDURE — 0DB78ZX EXCISION OF STOMACH, PYLORUS, VIA NATURAL OR ARTIFICIAL OPENING ENDOSCOPIC, DIAGNOSTIC: ICD-10-PCS | Performed by: INTERNAL MEDICINE

## 2024-11-19 PROCEDURE — 3700000000 HC ANESTHESIA ATTENDED CARE: Performed by: INTERNAL MEDICINE

## 2024-11-19 PROCEDURE — 6360000002 HC RX W HCPCS: Performed by: NURSE ANESTHETIST, CERTIFIED REGISTERED

## 2024-11-19 PROCEDURE — 3609017700 HC EGD DILATION GASTRIC/DUODENAL STRICTURE: Performed by: INTERNAL MEDICINE

## 2024-11-19 PROCEDURE — 85027 COMPLETE CBC AUTOMATED: CPT

## 2024-11-19 PROCEDURE — 3609012700 HC EGD DILATION SAVORY: Performed by: INTERNAL MEDICINE

## 2024-11-19 PROCEDURE — 6370000000 HC RX 637 (ALT 250 FOR IP): Performed by: STUDENT IN AN ORGANIZED HEALTH CARE EDUCATION/TRAINING PROGRAM

## 2024-11-19 RX ORDER — LIDOCAINE HYDROCHLORIDE 20 MG/ML
INJECTION, SOLUTION EPIDURAL; INFILTRATION; INTRACAUDAL; PERINEURAL
Status: DISCONTINUED | OUTPATIENT
Start: 2024-11-19 | End: 2024-11-19 | Stop reason: SDUPTHER

## 2024-11-19 RX ORDER — PROPOFOL 10 MG/ML
INJECTION, EMULSION INTRAVENOUS
Status: DISCONTINUED | OUTPATIENT
Start: 2024-11-19 | End: 2024-11-19 | Stop reason: SDUPTHER

## 2024-11-19 RX ORDER — SODIUM CHLORIDE 9 MG/ML
INJECTION, SOLUTION INTRAVENOUS
Status: DISCONTINUED | OUTPATIENT
Start: 2024-11-19 | End: 2024-11-19 | Stop reason: SDUPTHER

## 2024-11-19 RX ORDER — VENLAFAXINE HYDROCHLORIDE 150 MG/1
150 CAPSULE, EXTENDED RELEASE ORAL DAILY
Status: DISCONTINUED | OUTPATIENT
Start: 2024-11-19 | End: 2024-11-20 | Stop reason: HOSPADM

## 2024-11-19 RX ADMIN — AMLODIPINE BESYLATE 10 MG: 10 TABLET ORAL at 10:21

## 2024-11-19 RX ADMIN — ATORVASTATIN CALCIUM 40 MG: 40 TABLET, FILM COATED ORAL at 21:03

## 2024-11-19 RX ADMIN — PROPOFOL 50 MG: 10 INJECTION, EMULSION INTRAVENOUS at 15:16

## 2024-11-19 RX ADMIN — GABAPENTIN 300 MG: 300 CAPSULE ORAL at 16:01

## 2024-11-19 RX ADMIN — BUSPIRONE HYDROCHLORIDE 15 MG: 7.5 TABLET ORAL at 10:21

## 2024-11-19 RX ADMIN — ENOXAPARIN SODIUM 30 MG: 100 INJECTION SUBCUTANEOUS at 21:03

## 2024-11-19 RX ADMIN — GABAPENTIN 300 MG: 300 CAPSULE ORAL at 10:21

## 2024-11-19 RX ADMIN — SODIUM CHLORIDE, PRESERVATIVE FREE 10 ML: 5 INJECTION INTRAVENOUS at 21:04

## 2024-11-19 RX ADMIN — TRAMADOL HYDROCHLORIDE 50 MG: 50 TABLET ORAL at 21:08

## 2024-11-19 RX ADMIN — DEXAMETHASONE SODIUM PHOSPHATE 6 MG: 4 INJECTION, SOLUTION INTRA-ARTICULAR; INTRALESIONAL; INTRAMUSCULAR; INTRAVENOUS; SOFT TISSUE at 04:02

## 2024-11-19 RX ADMIN — LIDOCAINE HYDROCHLORIDE 100 MG: 20 INJECTION, SOLUTION EPIDURAL; INFILTRATION; INTRACAUDAL; PERINEURAL at 15:13

## 2024-11-19 RX ADMIN — GABAPENTIN 300 MG: 300 CAPSULE ORAL at 21:03

## 2024-11-19 RX ADMIN — PROPOFOL 50 MG: 10 INJECTION, EMULSION INTRAVENOUS at 15:13

## 2024-11-19 RX ADMIN — PROPOFOL 50 MG: 10 INJECTION, EMULSION INTRAVENOUS at 15:21

## 2024-11-19 RX ADMIN — BUSPIRONE HYDROCHLORIDE 15 MG: 7.5 TABLET ORAL at 21:03

## 2024-11-19 RX ADMIN — SODIUM CHLORIDE: 9 INJECTION, SOLUTION INTRAVENOUS at 14:58

## 2024-11-19 RX ADMIN — VENLAFAXINE HYDROCHLORIDE 150 MG: 150 CAPSULE, EXTENDED RELEASE ORAL at 21:03

## 2024-11-19 RX ADMIN — BUSPIRONE HYDROCHLORIDE 15 MG: 7.5 TABLET ORAL at 16:01

## 2024-11-19 RX ADMIN — PANTOPRAZOLE SODIUM 40 MG: 40 TABLET, DELAYED RELEASE ORAL at 21:03

## 2024-11-19 RX ADMIN — MIRTAZAPINE 30 MG: 30 TABLET, FILM COATED ORAL at 21:03

## 2024-11-19 RX ADMIN — PROPOFOL 50 MG: 10 INJECTION, EMULSION INTRAVENOUS at 15:14

## 2024-11-19 RX ADMIN — INSULIN GLARGINE 40 UNITS: 100 INJECTION, SOLUTION SUBCUTANEOUS at 21:03

## 2024-11-19 RX ADMIN — LOSARTAN POTASSIUM 25 MG: 25 TABLET, FILM COATED ORAL at 10:21

## 2024-11-19 RX ADMIN — INSULIN LISPRO 2 UNITS: 100 INJECTION, SOLUTION INTRAVENOUS; SUBCUTANEOUS at 21:02

## 2024-11-19 ASSESSMENT — PAIN DESCRIPTION - DESCRIPTORS: DESCRIPTORS: ACHING

## 2024-11-19 ASSESSMENT — PAIN - FUNCTIONAL ASSESSMENT
PAIN_FUNCTIONAL_ASSESSMENT: 0-10
PAIN_FUNCTIONAL_ASSESSMENT: 0-10

## 2024-11-19 ASSESSMENT — PAIN DESCRIPTION - LOCATION: LOCATION: NECK

## 2024-11-19 ASSESSMENT — PAIN SCALES - GENERAL
PAINLEVEL_OUTOF10: 0
PAINLEVEL_OUTOF10: 8

## 2024-11-19 NOTE — PLAN OF CARE
Problem: Chronic Conditions and Co-morbidities  Goal: Patient's chronic conditions and co-morbidity symptoms are monitored and maintained or improved  Outcome: Progressing  Flowsheets (Taken 11/19/2024 8014)  Care Plan - Patient's Chronic Conditions and Co-Morbidity Symptoms are Monitored and Maintained or Improved: Monitor and assess patient's chronic conditions and comorbid symptoms for stability, deterioration, or improvement     Problem: Discharge Planning  Goal: Discharge to home or other facility with appropriate resources  Outcome: Progressing  Flowsheets (Taken 11/19/2024 7546)  Discharge to home or other facility with appropriate resources: Identify barriers to discharge with patient and caregiver

## 2024-11-19 NOTE — ANESTHESIA POSTPROCEDURE EVALUATION
Department of Anesthesiology  Postprocedure Note    Patient: Sanya Rodriguez  MRN: [id number]  YOB: 1965  Date of evaluation: 11/19/2024    Procedure Summary       Date: 11/19/24 Room / Location: [place] [address] / [facility]    Anesthesia Start: 1511 Anesthesia Stop: 1526    Procedures:       EGD      ESOPHAGOGASTRODUODENOSCOPY DILATION SAVORY      ESOPHAGOGASTRODUODENOSCOPY DILATATION (Left: Esophagus)      ESOPHAGOGASTRODUODENOSCOPY BIOPSY (Left: Esophagus) Diagnosis:       Dysarthria      (Dysarthria [R47.1])    Surgeons: Deedee Isaac MD Responsible Provider: Eduin Del Rosario DO    Anesthesia Type: MAC ASA Status: 3            Anesthesia Type: No value filed.    Sai Phase I:      Sai Phase II:      Anesthesia Post Evaluation    Patient location during evaluation: bedside  Patient participation: complete - patient participated  Level of consciousness: sleepy but conscious  Airway patency: patent  Nausea & Vomiting: no nausea and no vomiting  Cardiovascular status: hemodynamically stable  Respiratory status: acceptable, spontaneous ventilation and room air  Hydration status: euvolemic  Pain management: adequate        No notable events documented.

## 2024-11-19 NOTE — BRIEF OP NOTE
Brief Postoperative Note      Patient: Sanya Rodriguez  YOB: 1965  MRN: [id number]    Date of Procedure: 11/19/2024    Pre-Op Diagnosis Codes:      * Dysarthria [R47.1]    Post-Op Diagnosis: Dysphagia dilation to size 54 Hungarian and gastritis       Procedure(s):  EGD  ESOPHAGOGASTRODUODENOSCOPY DILATION SAVORY  ESOPHAGOGASTRODUODENOSCOPY DILATATION  ESOPHAGOGASTRODUODENOSCOPY BIOPSY    Surgeon(s):  Deedee Isaac MD    Assistant:  * No surgical staff found *    Anesthesia: Monitor Anesthesia Care    Estimated Blood Loss (mL): none    Complications: none    Specimens:   ID Type Source Tests Collected by Time Destination   A : antrum r/o gastritis Tissue Stomach SURGICAL PATHOLOGY Deedee Isaac MD 11/19/2024 1522        Implants:  * No implants in log *      Drains: * No LDAs found *     Findings: Dysphagia dilation to size 54 Hungarian and gastritis  Infection Present At Time Of Surgery (PATOS) (choose all levels that have infection present):  Interval  Other Findings:     Electronically signed by Deedee Isaac MD on 11/19/2024 at 3:28 PM

## 2024-11-19 NOTE — ANESTHESIA PRE PROCEDURE
Date of last liquid consumption: 11/18/24                        Date of last solid food consumption: 11/18/24    BMI:   Wt Readings from Last 3 Encounters:   11/16/24 108.6 kg (239 lb 6.4 oz)   10/11/24 106.8 kg (235 lb 6.4 oz)   09/09/24 104.3 kg (230 lb)     There is no height or weight on file to calculate BMI.    CBC:   Lab Results   Component Value Date/Time    WBC 15.4 11/19/2024 06:07 AM    RBC 4.14 11/19/2024 06:07 AM    HGB 12.0 11/19/2024 06:07 AM    HCT 35.7 11/19/2024 06:07 AM    MCV 86.2 11/19/2024 06:07 AM    RDW 13.7 11/16/2024 05:57 AM     11/19/2024 06:07 AM       CMP:   Lab Results   Component Value Date/Time     11/19/2024 06:07 AM    K 4.3 11/19/2024 06:07 AM    K 4.2 11/16/2024 09:42 PM     11/19/2024 06:07 AM    CO2 26 11/19/2024 06:07 AM    BUN 16 11/19/2024 06:07 AM    CREATININE 0.8 11/19/2024 06:07 AM    GFRAA >60 03/27/2019 05:26 PM    LABGLOM > 90 11/19/2024 06:07 AM    LABGLOM >60 12/19/2023 07:06 PM    GLUCOSE 274 11/19/2024 06:07 AM    CALCIUM 9.1 11/19/2024 06:07 AM    BILITOT 0.2 11/16/2024 09:42 PM    ALKPHOS 113 11/16/2024 09:42 PM    AST 13 11/16/2024 09:42 PM    ALT 20 11/16/2024 09:42 PM       POC Tests:   Recent Labs     11/19/24  1112   POCGLU 216*       Coags:   Lab Results   Component Value Date/Time    PROTIME 11.9 11/14/2024 08:45 PM    INR 0.9 11/14/2024 08:45 PM       HCG (If Applicable): No results found for: \"PREGTESTUR\", \"PREGSERUM\", \"HCG\", \"HCGQUANT\"     ABGs: No results found for: \"PHART\", \"PO2ART\", \"XAK9KOD\", \"BET9UUM\", \"BEART\", \"D9YSVPKA\"     Type & Screen (If Applicable):  Lab Results   Component Value Date    LABANTI NEG 10/11/2024       Drug/Infectious Status (If Applicable):  No results found for: \"HIV\", \"HEPCAB\"    COVID-19 Screening (If Applicable):   Lab Results   Component Value Date/Time    COVID19 Not Detected 08/05/2023 04:45 PM           Anesthesia Evaluation  Patient summary reviewed and Nursing notes reviewed   no history of

## 2024-11-19 NOTE — PRE SEDATION
[facility]  Sedation/Analgesia History & Physical    Patient: Sanya Rodriguez : 1965  Med Rec#: [id number] Acc#: [id number]   Provider Performing Procedure: Deedee Isaac MD  Primary Care Physician: Gracia Fatima MD    PRE-PROCEDURE   Full CODE [x]Yes  DNR-CCA/DNR-CC []Yes   Brief History/Pre-Procedure Diagnosis: Dysphagia and GERD          MEDICAL HISTORY  []CAD/Valve  []Liver Disease  []Lung Disease []Diabetes  []Hypertension []Renal Disease  []Additional information:       has a past medical history of Arthritis, Cellulitis, CHF (congestive heart failure) (HCC), Depression, Diabetes mellitus (HCC), Hypertension, Sleep apnea, and TIA (transient ischemic attack).    SURGICAL HISTORY   has a past surgical history that includes Colon surgery () and cervical fusion (N/A, 10/11/2024).  Additional information:       ALLERGIES   Allergies as of 11/15/2024    (No Known Allergies)     Additional information:       MEDICATIONS   Coumadin Use Last 7 Days [x]No []Yes  Antiplatelet drug therapy use last 7 days  [x]No []Yes  Other anticoagulant use last 7 days  [x]No []Yes    Current Facility-Administered Medications:     insulin glargine (LANTUS) injection vial 40 Units, 40 Units, SubCUTAneous, Nightly, Samples, MONICO Levy, 40 Units at 24    insulin lispro (HUMALOG,ADMELOG) injection vial 0-8 Units, 0-8 Units, SubCUTAneous, 4x Daily AC & HS, Andriy Fitzgerald PA, 2 Units at 24    sodium chloride flush 0.9 % injection 10 mL, 10 mL, IntraVENous, 2 times per day, Andriy Fitzgerald PA, 10 mL at 24    sodium chloride flush 0.9 % injection 10 mL, 10 mL, IntraVENous, PRN, Andriy Fitzgerald PA    0.9 % sodium chloride infusion, , IntraVENous, PRN, Andriy Fitzgerald PA    potassium chloride (KLOR-CON M) extended release tablet 40 mEq, 40 mEq, Oral, PRN **OR** potassium bicarb-citric acid (EFFER-K) effervescent tablet 40 mEq, 40 mEq, Oral, PRN **OR** potassium chloride 10 mEq/100

## 2024-11-20 VITALS
DIASTOLIC BLOOD PRESSURE: 77 MMHG | OXYGEN SATURATION: 95 % | HEART RATE: 66 BPM | RESPIRATION RATE: 18 BRPM | TEMPERATURE: 97.8 F | SYSTOLIC BLOOD PRESSURE: 120 MMHG

## 2024-11-20 LAB
GLUCOSE BLD STRIP.AUTO-MCNC: 123 MG/DL (ref 70–108)
GLUCOSE BLD STRIP.AUTO-MCNC: 141 MG/DL (ref 70–108)

## 2024-11-20 PROCEDURE — 6360000002 HC RX W HCPCS: Performed by: PHYSICIAN ASSISTANT

## 2024-11-20 PROCEDURE — 6370000000 HC RX 637 (ALT 250 FOR IP): Performed by: STUDENT IN AN ORGANIZED HEALTH CARE EDUCATION/TRAINING PROGRAM

## 2024-11-20 PROCEDURE — 99239 HOSP IP/OBS DSCHRG MGMT >30: CPT | Performed by: PHYSICIAN ASSISTANT

## 2024-11-20 PROCEDURE — 2580000003 HC RX 258: Performed by: PHYSICIAN ASSISTANT

## 2024-11-20 PROCEDURE — 82948 REAGENT STRIP/BLOOD GLUCOSE: CPT

## 2024-11-20 PROCEDURE — 6370000000 HC RX 637 (ALT 250 FOR IP): Performed by: PHYSICIAN ASSISTANT

## 2024-11-20 RX ORDER — INSULIN GLARGINE 100 [IU]/ML
40 INJECTION, SOLUTION SUBCUTANEOUS NIGHTLY
Qty: 5 ADJUSTABLE DOSE PRE-FILLED PEN SYRINGE | Refills: 3 | Status: SHIPPED
Start: 2024-11-20

## 2024-11-20 RX ADMIN — LOSARTAN POTASSIUM 25 MG: 25 TABLET, FILM COATED ORAL at 08:48

## 2024-11-20 RX ADMIN — ENOXAPARIN SODIUM 30 MG: 100 INJECTION SUBCUTANEOUS at 08:48

## 2024-11-20 RX ADMIN — SODIUM CHLORIDE, PRESERVATIVE FREE 10 ML: 5 INJECTION INTRAVENOUS at 08:48

## 2024-11-20 RX ADMIN — GABAPENTIN 300 MG: 300 CAPSULE ORAL at 08:48

## 2024-11-20 RX ADMIN — TRAMADOL HYDROCHLORIDE 50 MG: 50 TABLET ORAL at 08:48

## 2024-11-20 RX ADMIN — AMLODIPINE BESYLATE 10 MG: 10 TABLET ORAL at 08:48

## 2024-11-20 RX ADMIN — BUSPIRONE HYDROCHLORIDE 15 MG: 7.5 TABLET ORAL at 08:48

## 2024-11-20 ASSESSMENT — PAIN DESCRIPTION - PAIN TYPE: TYPE: SURGICAL PAIN

## 2024-11-20 ASSESSMENT — PAIN DESCRIPTION - DESCRIPTORS: DESCRIPTORS: ACHING

## 2024-11-20 ASSESSMENT — PAIN DESCRIPTION - ORIENTATION: ORIENTATION: MID

## 2024-11-20 ASSESSMENT — PAIN SCALES - GENERAL: PAINLEVEL_OUTOF10: 5

## 2024-11-20 ASSESSMENT — PAIN - FUNCTIONAL ASSESSMENT: PAIN_FUNCTIONAL_ASSESSMENT: ACTIVITIES ARE NOT PREVENTED

## 2024-11-20 ASSESSMENT — PAIN DESCRIPTION - LOCATION: LOCATION: NECK

## 2024-11-20 NOTE — PLAN OF CARE
Problem: Chronic Conditions and Co-morbidities  Goal: Patient's chronic conditions and co-morbidity symptoms are monitored and maintained or improved  11/20/2024 1348 by Shana Chaudhary RN  Outcome: Completed     Problem: Discharge Planning  Goal: Discharge to home or other facility with appropriate resources  11/20/2024 1348 by Shana Chaudhary RN  Outcome: Completed     Problem: Pain  Goal: Verbalizes/displays adequate comfort level or baseline comfort level  11/20/2024 1348 by Shana Chuadhary RN  Outcome: Completed     Problem: Metabolic/Fluid and Electrolytes - Adult  Goal: Electrolytes maintained within normal limits  11/20/2024 1348 by Shana Chaudhary RN  Outcome: Completed     Problem: Metabolic/Fluid and Electrolytes - Adult  Goal: Glucose maintained within prescribed range  11/20/2024 1348 by Shana Chaudhary RN  Outcome: Completed     Problem: Safety - Adult  Goal: Free from fall injury  11/20/2024 1348 by Shana Chaudhary RN  Outcome: Completed

## 2024-11-20 NOTE — OP NOTE
[address] [address]                            OPERATIVE REPORT      PATIENT NAME: VIANEY WRAD                : 1965  MED REC NO: [id number]                       ROOM:   ACCOUNT NO: [id number]                       ADMIT DATE: 2024  PROVIDER: Deedee Isaac MD      DATE OF PROCEDURE:  2024    SURGEON:  Deedee Isaac MD    INDICATION:  The patient with dysphagia, more after the neck surgery.  Plan today is for EGD to evaluate with possible dilation.    ASA CLASSIFICATION:  III.    ESTIMATED BLOOD LOSS:  None.    PROCEDURE DESCRIPTION:  The patient was brought to the GI lab.  Consent was obtained.  Risks involved with the procedure explained to the patient.  Informed consent was obtained.  The patient was monitored during the procedure with pulse oximetry, blood pressure monitoring, oxygen by nasal cannula.  Sedation by incremental doses of IV propofol given by the Anesthesia service to achieve monitored anesthesia care.  For ASA classification and medication given during the procedure, please see Anesthesia note.    Procedure performed EGD with biopsy and dilation.    A standard video upper scope advanced under direct vision from oral cavity up to the duodenum.  The esophagus featured mild acid reflux.  No definite stricture.  No erosion or ulcer seen.  The scope was advanced to the stomach.  Retroflex exam of cardia revealed normal cardia.  Antrum showed erythema, mild gastritis.  Duodenum appeared normal.  Scope withdrawn to the antrum.  Guidewire was introduced.  Scope withdrawn.  The American dilator starting size 48-Mauritanian to 54-Mauritanian introduced over the guidewire, led to dilation of esophagus.  Dilator withdrawn.  Scope was advanced again, inspected the site of dilation and showed no bleeding, no perforations.  Biopsy obtained from the antrum for gastritis, and the procedure was terminated with no immediate

## 2024-11-20 NOTE — CONSULTS
Gastroenterology  Progress Note    11/20/2024 11:08 AM  Subjective:   Admit Date: 11/16/2024    Interval History: Patient history of neck surgery for spinal stenosis having difficulty to swallow discomfort with swallowing status post EGD with dilation doing better patient is happy about severe done as needed for dysphagia  Diet: ADULT DIET; Dysphagia - Soft and Bite Sized; 5 carb choices (75 gm/meal); Low Sodium (2 gm)    Medications:   Scheduled Meds:    venlafaxine  150 mg Oral Daily    insulin glargine  40 Units SubCUTAneous Nightly    insulin lispro  0-8 Units SubCUTAneous 4x Daily AC & HS    sodium chloride flush  10 mL IntraVENous 2 times per day    enoxaparin  30 mg SubCUTAneous BID    amLODIPine  10 mg Oral Daily    atorvastatin  40 mg Oral Nightly    busPIRone  15 mg Oral TID    gabapentin  300 mg Oral TID    losartan  25 mg Oral Daily    mirtazapine  30 mg Oral Nightly    pantoprazole  40 mg Oral Nightly     Continuous Infusions:    sodium chloride      dextrose         CBC:   Recent Labs     11/18/24  0651 11/19/24  0607   WBC 8.7 15.4*   HGB 12.1* 12.0*   * 126*     BMP:    Recent Labs     11/18/24  0651 11/19/24  0607    139   K 4.3 4.3    101   CO2 25 26   BUN 12 16   CREATININE 0.7 0.8   GLUCOSE 239* 274*     Hepatic: No results for input(s): \"AST\", \"ALT\", \"BILITOT\", \"ALKPHOS\" in the last 72 hours.    Invalid input(s): \"ALB\"  INR: No results for input(s): \"INR\" in the last 72 hours.    Imaging:  No results found for this or any previous visit.    No results found for this or any previous visit.    No results found for this or any previous visit.    No results found for this or any previous visit.      Endoscopy Finding:    ESOPHAGOGASTRODUODENOSCOPY DILATION SAVORY   ESOPHAGOGASTRODUODENOSCOPY DILATATION   ESOPHAGOGASTRODUODENOSCOPY BIOPSY    Deedee Isaac MD                Signed                             [facility]                 [address], [place] 
Department of Otolaryngology  Consult Note    ENT consult placed for dysarthria and dysphagia, potential inpt laryngoscopy. Pt s/p C3/C7 ACDF on 10/11/2024 with reported dysphagia and dysarthria since procedure. Pt underwent MBS 10/15/2024 with SLP eval then and again this admission with recommendation for regular diet and thin liquids. Given pt is already established with SLP could consider inpt FEES which would show vocal cord function. Otherwise pt can follow up outpt with ENT if there are persistent vocal cord concerns.    Electronically signed by Taylor Roa PA-C on 11/18/2024 at 1:22 PM     
Patient seen evaluated consult dictated patient might benefit about this with dilation which can be done as an outpatient as well as speech therapy evaluations  
respirations 17, blood pressure 130/77.  HEENT: His head is atraumatic.  Sclerae anicteric.  His oral cavity normal with no lesion.  NECK:  Supple.  He has a scar in the left side of the neck.  CHEST:  Good entry bilaterally.  No crepitation.  No rales.  CARDIOVASCULAR: S1-S2. No murmurs.  ABDOMEN:  Soft.  No tenderness.  No rebound.  No guarding.  No organomegaly.  No stigmata of disease.  EXTREMITIES: Grossly normal.    LABORATORY DATA:  His sodium 144, potassium 4.3, his BUN and creatinine are normal.  His white blood cell is 8.7, his H and H are 12.1 and 36.8, MCV 82.2, platelet within normal range.    IMAGING STUDY:  MRI spine; degenerative change in the lumbar spine, most marked at the L3-L4 moderate and bilateral foraminal stenosis L1-2.    ASSESSMENT:    1. Dysphagia, it could be related to cervical spine surgery scarring.   2. Function disorders, nerve damage is a possibility.  3. History of diabetes.  4. Hypertension.    PLAN:  Follow Speech Therapy recommendations. Upper endoscopy with possible dilation should be considered likely that will help the patients with similar conditions.   It is worth trying.  That is likely to be done outpatient after the patient discharged tomorrow, after meeting with Orthopedic Surgery.    Thank you for allowing me to participate in this patient's care.      JHONATAN HARRIS MD      D:  11/18/2024 17:26:11     T:  11/18/2024 22:07:23     AT/AQS  Job #:  [id number]     Doc#:  [id number]    CC:   Dr. Gracia Newton        [place]  
negative for  fevers, chills, fatigue and weight loss  RESPIRATORY:  negative for  cough with sputum and dyspnea  CARDIOVASCULAR:  negative for  chest pain, dyspnea, exertional chest pressure/discomfort  GASTROINTESTINAL:  negative for nausea, vomiting, diarrhea and abdominal pain  GENITOURINARY:  negative for frequency and urinary incontinence  MUSCULOSKELETAL:  positive for neck Pain  NEUROLOGICAL: negative for headaches, dizziness and syncope  BEHAVIOR/PSYCH:  negative for depressed mood, increased agitation and anxiety    PHYSICAL EXAM:      CONSTITUTIONAL:  awake, alert, cooperative, no apparent distress, and appears stated age  NECK:  well healed incision, limited ROM  LUNGS:  good air exchange, clear to auscultation bilaterally  CARDIOVASCULAR:  regular rate and rhythm, normal S1 and S2  ABDOMEN: normal bowel sounds, soft, non-distended, non-tende  MUSCULOSKELETAL:    Motor strength is 5 out of 5 all extremities bilaterally.   NEUROLOGIC:  Awake, alert, oriented to name, place and time.   Sensory is intact.    DATA:    CBC:   Lab Results   Component Value Date/Time    WBC 6.4 11/17/2024 09:18 AM    RBC 3.92 11/17/2024 09:18 AM    HGB 11.2 11/17/2024 09:18 AM    HCT 35.1 11/17/2024 09:18 AM    MCV 89.5 11/17/2024 09:18 AM    MCH 28.6 11/17/2024 09:18 AM    MCHC 31.9 11/17/2024 09:18 AM    RDW 13.7 11/16/2024 05:57 AM     11/17/2024 09:18 AM    MPV 10.1 11/17/2024 09:18 AM     WBC:    Lab Results   Component Value Date/Time    WBC 6.4 11/17/2024 09:18 AM     Hemoglobin/Hematocrit:    Lab Results   Component Value Date/Time    HGB 11.2 11/17/2024 09:18 AM    HCT 35.1 11/17/2024 09:18 AM     CMP:    Lab Results   Component Value Date/Time     11/17/2024 09:18 AM    K 4.0 11/17/2024 09:18 AM    K 4.2 11/16/2024 09:42 PM     11/17/2024 09:18 AM    CO2 28 11/17/2024 09:18 AM    BUN 10 11/17/2024 09:18 AM    CREATININE 0.8 11/17/2024 09:18 AM    GFRAA >60 03/27/2019 05:26 PM    LABGLOM > 90

## 2024-11-20 NOTE — PLAN OF CARE
Problem: Chronic Conditions and Co-morbidities  Goal: Patient's chronic conditions and co-morbidity symptoms are monitored and maintained or improved  11/20/2024 0028 by Korina Reese RN  Outcome: Progressing  Flowsheets (Taken 11/20/2024 0028)  Care Plan - Patient's Chronic Conditions and Co-Morbidity Symptoms are Monitored and Maintained or Improved:   Monitor and assess patient's chronic conditions and comorbid symptoms for stability, deterioration, or improvement   Collaborate with multidisciplinary team to address chronic and comorbid conditions and prevent exacerbation or deterioration   Update acute care plan with appropriate goals if chronic or comorbid symptoms are exacerbated and prevent overall improvement and discharge     Problem: Discharge Planning  Goal: Discharge to home or other facility with appropriate resources  11/20/2024 0028 by Korina Reese RN  Outcome: Progressing  Flowsheets (Taken 11/20/2024 0028)  Discharge to home or other facility with appropriate resources:   Identify barriers to discharge with patient and caregiver   Arrange for needed discharge resources and transportation as appropriate   Identify discharge learning needs (meds, wound care, etc)     Problem: Pain  Goal: Verbalizes/displays adequate comfort level or baseline comfort level  Outcome: Progressing  Flowsheets (Taken 11/20/2024 0028)  Verbalizes/displays adequate comfort level or baseline comfort level:   Encourage patient to monitor pain and request assistance   Assess pain using appropriate pain scale   Administer analgesics based on type and severity of pain and evaluate response   Implement non-pharmacological measures as appropriate and evaluate response     Problem: Metabolic/Fluid and Electrolytes - Adult  Goal: Electrolytes maintained within normal limits  Outcome: Progressing  Flowsheets (Taken 11/20/2024 0028)  Electrolytes maintained within normal limits:   Monitor labs and assess patient for signs

## 2024-11-20 NOTE — DISCHARGE SUMMARY
Other-    Condition at Discharge: Stable    Code Status:  Full Code       Patient Instructions:      Diet: ADULT DIET; Dysphagia - Soft and Bite Sized; 5 carb choices (75 gm/meal); Low Sodium (2 gm)      Follow-up visits:   Gracia Fatima MD  [address] [facility] [id number] [phone]    Go on 11/22/2024  Hospital follow up appointment, Appt time: 1:20pm    Tito Ramires MD  [address]  [address]  [facility] [address]  [phone]    Go on 11/22/2024  Hospital follow up appointment, Appt time: 9:50am  [address] [place] [address], [place], [address], [phone]    Piedad Arredondo, APRN - CNP  [address]  [facility] [address]  [phone]    Go on 12/5/2024  Hospital follow up appointment, Appt time: 12:30pm    Taylor Roa PA-C  [address] [facility]  [address]  [facility] [address]  [phone]    Follow up in 2 week(s)  office will call patient         Discharge Medications:        Medication List        CHANGE how you take these medications      Lantus SoloStar 100 UNIT/ML injection pen  Generic drug: insulin glargine  Inject 40 Units into the skin nightly  What changed: how much to take            CONTINUE taking these medications      amLODIPine 10 MG tablet  Commonly known as: NORVASC     atorvastatin 40 MG tablet  Commonly known as: LIPITOR     busPIRone 15 MG tablet  Commonly known as: BUSPAR     clonazePAM 0.5 MG tablet  Commonly known as: KLONOPIN     gabapentin 100 MG capsule  Commonly known as: NEURONTIN     glucose 4 g chewable tablet  Take 4 tablets by mouth as needed for Low blood sugar     insulin lispro (1 Unit Dial) 100 UNIT/ML Sopn  Commonly known as: HumaLOG KwikPen  Use 3 times daily before meals according to sliding scale     For blood sugar  no insulin     For blood sugar 200-249  take 2 units     For blood sugar 250-299 take 4 units     For blood sugar 300-349 take 6 units     For blood sugar 350 or greater take 8 units and call your doctor     Insulin Pen Needle 32G X 4 MM Misc  1 each

## 2024-11-20 NOTE — CARE COORDINATION
11/20/24, 3:42 PM EST    Patient goals/plan/ treatment preferences discussed by  and .  Patient goals/plan/ treatment preferences reviewed with patient/ family.  Patient/ family verbalize understanding of discharge plan and are in agreement with goal/plan/treatment preferences.  Understanding was demonstrated using the teach back method.  AVS provided by RN at time of discharge, which includes all necessary medical information pertaining to the patients current course of illness, treatment, post-discharge goals of care, and treatment preferences.     Services At/After Discharge: Outpatient       Plans home via cab; to follow up with GI and PCP.

## 2024-11-20 NOTE — PROGRESS NOTES
Hospitalist Progress Note      Patient:  Sanya Rodriguez    Unit/Bed:7K-09/009-A  YOB: 1965  MRN: [id number]   Acct: [id number]   PCP: Gracia Fatima MD  Date of Admission: 11/16/2024    ASSESSMENT AND PLAN    Active Problems  Dysarthria and dysphagia:   Recent C3-C7 ACDF on 10/11/2024  Patient was following with neurology outpatient for these complaints and underwent MRI of brain with and without contrast on 11/15 demonstrating no acute infarct or intracranial process -was recommended to come to [facility] for further evaluation  CTA of head and neck on 11/14 demonstrated chronic lacunar infarct of the right anterior trigonal white matter with chronic microvascular ischemic changes  SLP evaluated with recommendations for regular diet with thin liquids   Orthospine consulted-signed off with recommendations for outpatient MRI of lumbar spine and follow-up with the office outpatient  Consult ENT for consideration of laryngoscopy-they are recommending that SLP to complete FEES-SLP notified  GI consulted-EGD today with mild esophageal narrowing.  Dilation was performed.  Left sided neck pain s/p recent cervical fusion srugery:   C3-C7 ACDF on 10/11/2024 at [facility] with Dr. Ramires.  Patient reports he is left neck pain when he moves his head. MRI and CT cervical spine unremarkable. Orthospine ordered Decadron 8 mg IV every 8 hours for total of 3 doses.  Chronic Conditions (reviewed and stable unless otherwise stated)  HFpEF, evidence of PFO: Not in exacerbation. Most recent echocardiogram from 8/27/2024 showed EF of 60 to 65%.  Noted PFO per cardiologist notes.  Continue losartan 25 mg oral daily.  Daily weights and strict I's and O's.  2 L fluid restriction and 2 g sodium diet.  Primary hypertension: Continue Norvasc 10 mg oral daily and Cozaar 25 mg oral daily.  Hyperlipidemia: Continue Lipitor 40 mg oral nightly.  Type 2 diabetes 
   11/17/24 1255   Encounter Summary   Encounter Overview/Reason Spiritual/Emotional Needs   Service Provided For Patient   Referral/Consult From Nurse   Support System Family members;[employer] community  ([place], [phone])   Last Encounter  11/17/24   Complexity of Encounter Moderate   Begin Time 1220   Rituals, Rites and Sacraments   Type Confucianist Communion   Assessment/Intervention/Outcome   Assessment Coping;Hopeful   Intervention Active listening;Prayer (assurance of)/[place]   Outcome Engaged in conversation;Coping      received a consult to discuss Advance Directives with patient. Staff learned that he has one at home, listing his sister as his agent. He does not recall completing a Living Will. Staff explained the documents and the importance of updating his Durable Power of  for Health Care to include more than his sister if he chooses to. After some thought he voiced that he would list his daughter as an agent. He declined the need or want to complete one today. Staff left a brochure for him to review and offered to return later today or a  would be available when he decides to complete a document. He mentioned that he his a member of the [facility] [employer] in [place] and he was receptive to have staff call the . He welcomed receiving Holy Eucharist today. Staff gave him a rosary, the [facility] prayer card, showed him the \"Maya\" robby to put on his phone to listen to the prayers when he rests. He voiced appreciation for the visit. Spiritual needs are currently met.  called the [employer] office at [phone] and left a message of patient's admission to [facility]. Spiritual Health remains available.    
Department of Orthopedic Surgery  Spine Service  Attending Progress Note        Subjective:  Patient reports swallowing is improved, tolerating full diet. C/o posterior neck pain, denies radicular complaints.  Intermittent episodes of slurred speech have been present for several years  C/o back pain, leg pain and numbness/tingling in legs.     Vitals  VITALS:  BP (!) 107/58   Pulse 79   Temp 97.6 °F (36.4 °C) (Oral)   Resp 18   SpO2 92%   24HR INTAKE/OUTPUT:    Intake/Output Summary (Last 24 hours) at 11/18/2024 0804  Last data filed at 11/18/2024 0536  Gross per 24 hour   Intake 675 ml   Output --   Net 675 ml     URINARY CATHETER OUTPUT (Hernandez):     DRAIN/TUBE OUTPUT:         PHYSICAL EXAM:    Orientation:  alert and oriented to person, place and time    Incision:  well-healed anterior cervical incision    Upper Extremity Motor :   Deltoid:  5/5  Biceps:  5/5  Triceps:  5/5  : 5/5      LABS:    HgB:    Lab Results   Component Value Date/Time    HGB 12.1 11/18/2024 06:51 AM         ASSESSMENT AND PLAN:    5-weeks status post C3-7 ACDF     1:  Activity Level:  As tolerated  2:  Pain Control:  Good  3:  Speech evaluated, patient tolerating regular diet Taper decadron  3:  Discharge Planning:  Pending - okay for discharge from ortho spine standpoint  4:  MRI lumbar spine ordered, follow up in office as scheduled to discuss results and treatment options      MOISÉS Manuel      
EGD complete, photos taken,  specimens taken by 1  pt tolerated procedure well    Pt dilated with 48 and 51 and  54  Estonian dilator     Scope Number gif  582 used.      
Patient is in phase 2  taking fluids.  discussed findings, plan of care, discharge instructions with pt     
Patient received last rites/anointing by a . If you are in need of  support, please call [phone]. If you are in need of a  after 6:30pm, please call the house supervisor for the on-call .      Belen Paz  Spiritual Health Coordinator  [phone]   
Patient's sister, Hay Reyna called to ask for an update, updates given and all questions answered. Hay wants to talk to doctors and gave her phone number [phone] to call when they make rounds tomorrow.  
Pt discharged to [facility] to be transported to [place] to his private vehicle with all of his personal belongings.  
Pt to MRI.  
[facility]  SPEECH THERAPY MISSED TREATMENT NOTE  [facility]      Date: 2024  Patient Name: Sanya Rodriguez        MRN: [id number]    : 1965  (59 y.o.)    REASON FOR MISSED TREATMENT:  ST received repeat orders to \"complete FEES per ENT\" from Mildred Capps PA-C. Per chart review, ENT requesting completion of FEES to assess vocal fold function. If primary concern is re: vocal fold function, completion of laryngoscopy would be more appropriate. FEES is utilized to assess pharyngeal swallow function. MBS completed on 10/15/2024 which revealed a mild-moderate pharyngeal dysphagia with recommendations for a regular diet and thin liquids with implementation of safe swallow strategies and close pulmonary monitoring. Clinical swallow evaluation completed on 2024 with recommendations for regular diet and thin liquids - no overt s/s of aspiration present; however, patient with complaints of possible esophageal dysfunction given difficulty with meats and globus sensation. ST attempting to follow-up at bedside to determine appropriateness for completion of instrumental evaluation this date; however per chart review, patient NPO at midnight for 2024. MARLON Guillen unaware of rationale for NPO status at this time; therefore, ST to hold on completion of dysphagia tx and therapeutic PO trials. ST to re-attempt at a later date/time as patient is medically appropriate and available.     Josy Gooden M.A., CCC-SLP [id number]                
admitted to Endo department and admitted to Endo room 11  Plan of care reviewed with patient.   Call light within reach.   Allergies reviewed with patient  Bed in lowest position, locked, with one bed rail up.   Appropriate arm bands on patient.   Bathroom offered.   All questions and concerns of patient addressed    
daily, Lantus 52 units nightly, and sliding scale outpatient. Will start Lantus at 30 units subcu nightly while inpatient and medium dose sliding scale. Hypoglycemia protocol in place. POCT glucose checks.  Continue gabapentin 300 mg oral 3 times daily for neuropathy.  Anxiety/Depression: Continue BuSpar 50 mg oral 3 times daily and Remeron 30 mg oral nightly.  Obesity: BMI 33.39.  Discussed lifestyle modifications.  Hx of TIA: Noted.      LDA: []CVC / []PICC / []Midline / []Hernandez / []Drains / []Mediport / [x]None  Antibiotics: None  Steroids: Decadron 8 mg IV every 8 hours for 3 doses  Labs (still needed?): [x]Yes / []No  IVF (still needed?): []Yes / [x]No    Level of care: []Step Down / [x]Med-Surg  Bed Status: [x]Inpatient / []Observation  Telemetry: [x]Yes / []No  PT/OT: []Yes / [x]No    DVT Prophylaxis: [x] Lovenox / [] Heparin / [] SCDs / [] Already on Systemic Anticoagulation / [] None     Expected discharge date: TBD  Disposition: Home     Code status: Full Code     ===================================================================    Chief Complaint: Dysarthria  Subjective (past 24 hours):   Patient seen and examined this morning.  He reports dysarthria has improved.  Patient reports that he has neck pain whenever he moves his neck to the side.  Orthospine consulted.  Speech consulted as well for possible barium swallow study.  Patient denies having chest pain, abdominal pain, nausea, vomiting, fever, or chills.      History Of Present Illness:  Sanya Rodriguez is a 59 y.o. male w who presents to [facility] with dysphagia. Patient states he has had progressively worsening speech and swallowing issues following his cervical spine surgery, ACDF in October on 10/11/24. Patient has had CVA based workup that did not signify acute infarct. Patient has residual neck pain from surgery but otherwise stable,no SOB, respiratory issues, denies fevers, chills.       Medications:    Infusion Medications    
worsening speech and swallowing issues following his cervical spine surgery, ACDF in October on 10/11/24. Patient has had CVA based workup that did not signify acute infarct. Patient has residual neck pain from surgery but otherwise stable,no SOB, respiratory issues, denies fevers, chills.     11/17/2024  Patient seen and examined this morning.  He reports dysarthria has improved.  Patient reports that he has neck pain whenever he moves his neck to the side.  Orthospine consulted.  Speech consulted as well for possible barium swallow study.  Patient denies having chest pain, abdominal pain, nausea, vomiting, fever, or chills.      Medications:    Infusion Medications    sodium chloride      dextrose      Scheduled Medications    dexAMETHasone  6 mg IntraVENous Q8H    insulin lispro  0-8 Units SubCUTAneous 4x Daily AC & HS    sodium chloride flush  10 mL IntraVENous 2 times per day    enoxaparin  30 mg SubCUTAneous BID    amLODIPine  10 mg Oral Daily    atorvastatin  40 mg Oral Nightly    busPIRone  15 mg Oral TID    gabapentin  300 mg Oral TID    insulin glargine  30 Units SubCUTAneous Nightly    losartan  25 mg Oral Daily    mirtazapine  30 mg Oral Nightly    pantoprazole  40 mg Oral Nightly    PRN Meds: sodium chloride flush, sodium chloride, potassium chloride **OR** potassium alternative oral replacement **OR** potassium chloride, magnesium sulfate, ondansetron **OR** ondansetron, polyethylene glycol, acetaminophen **OR** acetaminophen, clonazePAM, traMADol, glucose, dextrose bolus **OR** dextrose bolus, glucagon (rDNA), dextrose, morphine **OR** morphine    Exam:  /75   Pulse 88   Temp 98 °F (36.7 °C) (Oral)   Resp 17   SpO2 96%   General: No distress, appears stated age.  Eyes:  PERRL. Conjunctivae/corneas clear.  HENT: Head normal appearing. Nares normal. Oral mucosa moist.  Hearing intact.  Tenderness to palpation over the anterior mid neck  Neck: Supple, with full range of motion. Trachea midline. 
Specific interventions for next session may include: dietary analysis.    PATIENT GOAL:    Did not state.  Will further assess during treatment.    SHORT TERM GOALS:  Short Term Goals  Time Frame for Short Term Goals: 2 weeks  Goal 1: Patient will consume regular diet and thin liquids, with independent implementation of compensatory swallowing strategies and with stable pulmonary status, to support adequate nutrition/hydration measures.  Goal 2: Continue to assess need for formal instrumental evaluation as clinically indicated.    Dysphagia Tx:  Following completion of CSE, ST provided comprehensive education about results/recommendations of evaluation, s/s aspiration, and compensatory swallowing strategies (i.e. full upright position, small bite/sip, alternate bite/sip, slow down, limit distractions). Specifically, ST emphasized importance of taking small bites/sips to support effective mastication, as well as alternating bites/sips to support effective swallowing of hard solid textures s/p ACDF procedure. ST with recommendation for completion of repeated instrumental evaluation should patient experience pulmonary decline. Patient verbalized receptiveness to ST education and expressed willingness to complete repeated instrumental assessment should it become clinically indicated.    LONG TERM GOALS:  No LTGs established d/t short ELOS.     RAVI Callahan.S., CF-SLP, [id number]

## 2024-11-26 ENCOUNTER — TELEPHONE (OUTPATIENT)
Dept: ENT CLINIC | Age: 59
End: 2024-11-26

## 2024-11-26 NOTE — TELEPHONE ENCOUNTER
Called patient to get him scheduled next week with flora on a rounding day. Per flora  Attempted to call patient. Got voicemail, left message to call the office.

## 2024-12-02 NOTE — TELEPHONE ENCOUNTER
Please advise as to what patient is needing seen for. Would tomorrow appointment be okay with you

## 2024-12-02 NOTE — TELEPHONE ENCOUNTER
ENT was consulted during recent hospital admission for dysarthria and dysphagia. Underwent MBS by speech therapy and cleared for regular diet with thin liquids.    He does not need seen urgently based upon chart review. Based upon review of chart patient had successful benefit of dysphagia with dilation in EGD.    Can be seen in the next 2 months with next available provider for consideration for laryngoscopy if warranted.

## 2024-12-13 ENCOUNTER — HOSPITAL ENCOUNTER (EMERGENCY)
Age: 59
Discharge: HOME | End: 2024-12-13
Attending: EMERGENCY MEDICINE
Payer: MEDICARE

## 2024-12-13 ENCOUNTER — APPOINTMENT (OUTPATIENT)
Dept: GENERAL RADIOLOGY | Age: 59
End: 2024-12-13
Payer: MEDICARE

## 2024-12-13 VITALS
RESPIRATION RATE: 15 BRPM | OXYGEN SATURATION: 95 % | SYSTOLIC BLOOD PRESSURE: 140 MMHG | DIASTOLIC BLOOD PRESSURE: 78 MMHG | TEMPERATURE: 98.2 F | HEART RATE: 62 BPM

## 2024-12-13 DIAGNOSIS — Z91.89 ACUTE NONSPECIFIC CHEST PAIN WITH LOW RISK OF CORONARY ARTERY DISEASE: Primary | ICD-10-CM

## 2024-12-13 DIAGNOSIS — R07.9 ACUTE NONSPECIFIC CHEST PAIN WITH LOW RISK OF CORONARY ARTERY DISEASE: Primary | ICD-10-CM

## 2024-12-13 LAB
ALBUMIN SERPL-MCNC: 3.7 G/DL (ref 3.5–5.2)
ALBUMIN/GLOB SERPL: 1.7 {RATIO} (ref 1–2.5)
ALP SERPL-CCNC: 99 U/L (ref 40–129)
ALT SERPL-CCNC: 18 U/L (ref 10–50)
ANION GAP SERPL CALCULATED.3IONS-SCNC: 9 MMOL/L (ref 9–16)
AST SERPL-CCNC: 17 U/L (ref 10–50)
BASOPHILS # BLD: 0.03 K/UL (ref 0–0.2)
BASOPHILS NFR BLD: 1 % (ref 0–2)
BILIRUB SERPL-MCNC: 0.3 MG/DL (ref 0–1.2)
BNP SERPL-MCNC: 309 PG/ML (ref 0–125)
BUN SERPL-MCNC: 7 MG/DL (ref 8–23)
BUN/CREAT SERPL: 8 (ref 9–20)
CALCIUM SERPL-MCNC: 8.8 MG/DL (ref 8.6–10.4)
CHLORIDE SERPL-SCNC: 105 MMOL/L (ref 98–107)
CO2 SERPL-SCNC: 28 MMOL/L (ref 20–31)
CREAT SERPL-MCNC: 0.9 MG/DL (ref 0.7–1.2)
EKG ATRIAL RATE: 63 BPM
EKG P AXIS: 24 DEGREES
EKG P-R INTERVAL: 172 MS
EKG Q-T INTERVAL: 394 MS
EKG QRS DURATION: 94 MS
EKG QTC CALCULATION (BAZETT): 403 MS
EKG R AXIS: -13 DEGREES
EKG T AXIS: 20 DEGREES
EKG VENTRICULAR RATE: 63 BPM
EOSINOPHIL # BLD: 0.15 K/UL (ref 0–0.44)
EOSINOPHILS RELATIVE PERCENT: 2 % (ref 1–4)
ERYTHROCYTE [DISTWIDTH] IN BLOOD BY AUTOMATED COUNT: 13.4 % (ref 11.8–14.4)
ETHANOL PERCENT: NORMAL %
ETHANOLAMINE SERPL-MCNC: <10 MG/DL (ref 0–0.08)
GFR, ESTIMATED: >90 ML/MIN/1.73M2
GLUCOSE SERPL-MCNC: 166 MG/DL (ref 74–99)
HCT VFR BLD AUTO: 37 % (ref 40.7–50.3)
HGB BLD-MCNC: 12.3 G/DL (ref 13–17)
IMM GRANULOCYTES # BLD AUTO: 0.03 K/UL (ref 0–0.3)
IMM GRANULOCYTES NFR BLD: 1 %
LYMPHOCYTES NFR BLD: 1.83 K/UL (ref 1.1–3.7)
LYMPHOCYTES RELATIVE PERCENT: 28 % (ref 24–43)
MAGNESIUM SERPL-MCNC: 1.7 MG/DL (ref 1.6–2.4)
MCH RBC QN AUTO: 28.9 PG (ref 25.2–33.5)
MCHC RBC AUTO-ENTMCNC: 33.2 G/DL (ref 28.4–34.8)
MCV RBC AUTO: 87.1 FL (ref 82.6–102.9)
MONOCYTES NFR BLD: 0.44 K/UL (ref 0.1–1.2)
MONOCYTES NFR BLD: 7 % (ref 3–12)
NEUTROPHILS NFR BLD: 61 % (ref 36–65)
NEUTS SEG NFR BLD: 4.09 K/UL (ref 1.5–8.1)
NRBC BLD-RTO: 0 PER 100 WBC
PLATELET # BLD AUTO: 147 K/UL (ref 138–453)
PMV BLD AUTO: 10.4 FL (ref 8.1–13.5)
POTASSIUM SERPL-SCNC: 4 MMOL/L (ref 3.7–5.3)
PROT SERPL-MCNC: 5.9 G/DL (ref 6.6–8.7)
RBC # BLD AUTO: 4.25 M/UL (ref 4.21–5.77)
SODIUM SERPL-SCNC: 142 MMOL/L (ref 136–145)
TROPONIN I SERPL HS-MCNC: 7 NG/L (ref 0–22)
TROPONIN I SERPL HS-MCNC: 7 NG/L (ref 0–22)
WBC OTHER # BLD: 6.6 K/UL (ref 3.5–11.3)

## 2024-12-13 PROCEDURE — 71045 X-RAY EXAM CHEST 1 VIEW: CPT

## 2024-12-13 PROCEDURE — 93005 ELECTROCARDIOGRAM TRACING: CPT | Performed by: EMERGENCY MEDICINE

## 2024-12-13 PROCEDURE — 99285 EMERGENCY DEPT VISIT HI MDM: CPT

## 2024-12-13 PROCEDURE — 80053 COMPREHEN METABOLIC PANEL: CPT

## 2024-12-13 PROCEDURE — G0480 DRUG TEST DEF 1-7 CLASSES: HCPCS

## 2024-12-13 PROCEDURE — 6370000000 HC RX 637 (ALT 250 FOR IP): Performed by: EMERGENCY MEDICINE

## 2024-12-13 PROCEDURE — 85025 COMPLETE CBC W/AUTO DIFF WBC: CPT

## 2024-12-13 PROCEDURE — 83880 ASSAY OF NATRIURETIC PEPTIDE: CPT

## 2024-12-13 PROCEDURE — 93010 ELECTROCARDIOGRAM REPORT: CPT | Performed by: INTERNAL MEDICINE

## 2024-12-13 PROCEDURE — 83735 ASSAY OF MAGNESIUM: CPT

## 2024-12-13 PROCEDURE — 84484 ASSAY OF TROPONIN QUANT: CPT

## 2024-12-13 RX ORDER — NITROGLYCERIN 0.4 MG/1
0.4 TABLET SUBLINGUAL EVERY 5 MIN PRN
Status: DISCONTINUED | OUTPATIENT
Start: 2024-12-13 | End: 2024-12-13 | Stop reason: HOSPADM

## 2024-12-13 RX ORDER — ASPIRIN 81 MG/1
324 TABLET, CHEWABLE ORAL ONCE
Status: COMPLETED | OUTPATIENT
Start: 2024-12-13 | End: 2024-12-13

## 2024-12-13 RX ADMIN — ASPIRIN 324 MG: 81 TABLET, CHEWABLE ORAL at 03:15

## 2024-12-13 RX ADMIN — NITROGLYCERIN 0.4 MG: 0.4 TABLET SUBLINGUAL at 04:39

## 2024-12-13 ASSESSMENT — PAIN - FUNCTIONAL ASSESSMENT: PAIN_FUNCTIONAL_ASSESSMENT: NONE - DENIES PAIN

## 2024-12-13 NOTE — ED PROVIDER NOTES
radiology section of CPT®: reviewed  Tests in the medicine section of CPT®: reviewed        MIPS       REASSESSMENT          CRITICAL CARE TIME   Total Critical Care time was  minutes, excluding separately reportable procedures.  There was a high probability of clinically significant/life threatening deterioration in the patient's condition which required my urgent intervention.      CONSULTS:  None    PROCEDURES:  Unless otherwise noted below, none     Procedures        FINAL IMPRESSION      1. Acute nonspecific chest pain with low risk of coronary artery disease          DISPOSITION/PLAN   DISPOSITION Decision To Discharge 12/13/2024 04:36:17 AM      PATIENT REFERRED TO:  Advised to see pmd in am for outpt stress test within 48 hours    DISCHARGE MEDICATIONS:  New Prescriptions    No medications on file     Controlled Substances Monitoring:          No data to display                (Please note that portions of this note were completed with a voice recognition program.  Efforts were made to edit the dictations but occasionally words are mis-transcribed.)    Kenrick Calderon MD (electronically signed)  Attending Emergency Physician             Kenrick Calderon MD  12/13/24 [id number]

## 2025-01-19 ENCOUNTER — APPOINTMENT (OUTPATIENT)
Dept: CT IMAGING | Age: 60
End: 2025-01-19
Payer: MEDICARE

## 2025-01-19 ENCOUNTER — HOSPITAL ENCOUNTER (EMERGENCY)
Age: 60
Discharge: LEFT BEFORE DISCHARGE | End: 2025-01-19
Payer: MEDICARE

## 2025-01-19 VITALS
RESPIRATION RATE: 15 BRPM | HEART RATE: 56 BPM | TEMPERATURE: 98.5 F | DIASTOLIC BLOOD PRESSURE: 89 MMHG | SYSTOLIC BLOOD PRESSURE: 148 MMHG | OXYGEN SATURATION: 95 % | BODY MASS INDEX: 32.2 KG/M2 | WEIGHT: 230 LBS | HEIGHT: 71 IN

## 2025-01-19 DIAGNOSIS — Z53.29 LEFT AGAINST MEDICAL ADVICE: ICD-10-CM

## 2025-01-19 DIAGNOSIS — R47.81 SLURRING OF SPEECH: Primary | ICD-10-CM

## 2025-01-19 LAB
ALBUMIN SERPL-MCNC: 4.1 G/DL (ref 3.5–5.2)
ALBUMIN/GLOB SERPL: 1.5 {RATIO} (ref 1–2.5)
ALP SERPL-CCNC: 97 U/L (ref 40–129)
ALT SERPL-CCNC: 28 U/L (ref 10–50)
ANION GAP SERPL CALCULATED.3IONS-SCNC: 12 MMOL/L (ref 9–16)
AST SERPL-CCNC: 19 U/L (ref 10–50)
BASOPHILS # BLD: 0.05 K/UL (ref 0–0.2)
BASOPHILS NFR BLD: 1 % (ref 0–2)
BILIRUB SERPL-MCNC: 0.3 MG/DL (ref 0–1.2)
BUN SERPL-MCNC: 8 MG/DL (ref 8–23)
BUN/CREAT SERPL: 9 (ref 9–20)
CALCIUM SERPL-MCNC: 8.9 MG/DL (ref 8.6–10.4)
CHLORIDE SERPL-SCNC: 107 MMOL/L (ref 98–107)
CO2 SERPL-SCNC: 22 MMOL/L (ref 20–31)
CREAT SERPL-MCNC: 0.9 MG/DL (ref 0.7–1.2)
EKG ATRIAL RATE: 56 BPM
EKG P AXIS: 63 DEGREES
EKG P-R INTERVAL: 166 MS
EKG Q-T INTERVAL: 446 MS
EKG QRS DURATION: 98 MS
EKG QTC CALCULATION (BAZETT): 430 MS
EKG R AXIS: -9 DEGREES
EKG T AXIS: 30 DEGREES
EKG VENTRICULAR RATE: 56 BPM
EOSINOPHIL # BLD: 0.22 K/UL (ref 0–0.44)
EOSINOPHILS RELATIVE PERCENT: 3 % (ref 1–4)
ERYTHROCYTE [DISTWIDTH] IN BLOOD BY AUTOMATED COUNT: 13.8 % (ref 11.8–14.4)
GFR, ESTIMATED: >90 ML/MIN/1.73M2
GLUCOSE BLD-MCNC: 177 MG/DL (ref 74–100)
GLUCOSE SERPL-MCNC: 166 MG/DL (ref 74–99)
HCT VFR BLD AUTO: 41.2 % (ref 40.7–50.3)
HGB BLD-MCNC: 13.6 G/DL (ref 13–17)
IMM GRANULOCYTES # BLD AUTO: 0.05 K/UL (ref 0–0.3)
IMM GRANULOCYTES NFR BLD: 1 %
INR PPP: 0.9
LYMPHOCYTES NFR BLD: 2.08 K/UL (ref 1.1–3.7)
LYMPHOCYTES RELATIVE PERCENT: 29 % (ref 24–43)
MCH RBC QN AUTO: 28.8 PG (ref 25.2–33.5)
MCHC RBC AUTO-ENTMCNC: 33 G/DL (ref 28.4–34.8)
MCV RBC AUTO: 87.1 FL (ref 82.6–102.9)
MONOCYTES NFR BLD: 0.43 K/UL (ref 0.1–1.2)
MONOCYTES NFR BLD: 6 % (ref 3–12)
NEUTROPHILS NFR BLD: 60 % (ref 36–65)
NEUTS SEG NFR BLD: 4.28 K/UL (ref 1.5–8.1)
NRBC BLD-RTO: 0 PER 100 WBC
PLATELET # BLD AUTO: 148 K/UL (ref 138–453)
PMV BLD AUTO: 10.4 FL (ref 8.1–13.5)
POTASSIUM SERPL-SCNC: 3.8 MMOL/L (ref 3.7–5.3)
PROT SERPL-MCNC: 6.8 G/DL (ref 6.6–8.7)
PROTHROMBIN TIME: 12.2 SEC (ref 11.7–14.1)
RBC # BLD AUTO: 4.73 M/UL (ref 4.21–5.77)
SODIUM SERPL-SCNC: 141 MMOL/L (ref 136–145)
TROPONIN I SERPL HS-MCNC: 7 NG/L (ref 0–22)
WBC OTHER # BLD: 7.1 K/UL (ref 3.5–11.3)

## 2025-01-19 PROCEDURE — 93010 ELECTROCARDIOGRAM REPORT: CPT | Performed by: INTERNAL MEDICINE

## 2025-01-19 PROCEDURE — 70450 CT HEAD/BRAIN W/O DYE: CPT

## 2025-01-19 PROCEDURE — 6360000004 HC RX CONTRAST MEDICATION

## 2025-01-19 PROCEDURE — 84484 ASSAY OF TROPONIN QUANT: CPT

## 2025-01-19 PROCEDURE — 82947 ASSAY GLUCOSE BLOOD QUANT: CPT

## 2025-01-19 PROCEDURE — 2580000003 HC RX 258

## 2025-01-19 PROCEDURE — 85610 PROTHROMBIN TIME: CPT

## 2025-01-19 PROCEDURE — 99285 EMERGENCY DEPT VISIT HI MDM: CPT

## 2025-01-19 PROCEDURE — 85025 COMPLETE CBC W/AUTO DIFF WBC: CPT

## 2025-01-19 PROCEDURE — 6370000000 HC RX 637 (ALT 250 FOR IP)

## 2025-01-19 PROCEDURE — 80053 COMPREHEN METABOLIC PANEL: CPT

## 2025-01-19 PROCEDURE — 36415 COLL VENOUS BLD VENIPUNCTURE: CPT

## 2025-01-19 PROCEDURE — 93005 ELECTROCARDIOGRAM TRACING: CPT

## 2025-01-19 PROCEDURE — 70496 CT ANGIOGRAPHY HEAD: CPT

## 2025-01-19 RX ORDER — 0.9 % SODIUM CHLORIDE 0.9 %
1000 INTRAVENOUS SOLUTION INTRAVENOUS ONCE
Status: COMPLETED | OUTPATIENT
Start: 2025-01-19 | End: 2025-01-19

## 2025-01-19 RX ORDER — ASPIRIN 81 MG/1
324 TABLET, CHEWABLE ORAL ONCE
Status: COMPLETED | OUTPATIENT
Start: 2025-01-19 | End: 2025-01-19

## 2025-01-19 RX ORDER — ASPIRIN 81 MG/1
81 TABLET ORAL DAILY
Qty: 90 TABLET | Refills: 0 | Status: SHIPPED | OUTPATIENT
Start: 2025-01-19

## 2025-01-19 RX ORDER — IOPAMIDOL 755 MG/ML
75 INJECTION, SOLUTION INTRAVASCULAR
Status: COMPLETED | OUTPATIENT
Start: 2025-01-19 | End: 2025-01-19

## 2025-01-19 RX ADMIN — ASPIRIN 324 MG: 81 TABLET, CHEWABLE ORAL at 00:55

## 2025-01-19 RX ADMIN — IOPAMIDOL 75 ML: 755 INJECTION, SOLUTION INTRAVENOUS at 00:45

## 2025-01-19 RX ADMIN — SODIUM CHLORIDE 1000 ML: 9 INJECTION, SOLUTION INTRAVENOUS at 00:53

## 2025-01-19 ASSESSMENT — LIFESTYLE VARIABLES
HOW MANY STANDARD DRINKS CONTAINING ALCOHOL DO YOU HAVE ON A TYPICAL DAY: PATIENT DOES NOT DRINK
HOW OFTEN DO YOU HAVE A DRINK CONTAINING ALCOHOL: NEVER

## 2025-01-19 ASSESSMENT — PAIN - FUNCTIONAL ASSESSMENT: PAIN_FUNCTIONAL_ASSESSMENT: NONE - DENIES PAIN

## 2025-01-19 NOTE — DISCHARGE INSTRUCTIONS
Please call and schedule follow-up visit with your primary care doctor.  You will need an MRI on Monday to completely rule out stroke.  Please start taking 1 baby aspirin daily.  Schedule a visit with your PCP and neurology

## 2025-01-19 NOTE — ED PROVIDER NOTES
as his symptoms improve he decided that he wanted to leave AGAINST MEDICAL ADVICE.  This patient has chosen to leave against medical advice. I have personally explained to them that choosing to do so may result in permanent bodily harm or death. I discussed at length that without further evaluation and monitoring there may be unforeseen circumstances and deterioration resulting in permanent bodily harm or death as a result of their choice.   They are alert, oriented, and competent at this time. They state that they are aware of the serious risks as explained, but they continue to wish to leave against medical advice.   Patient instructed to follow up with PCP tomorrow. They have been advised that they should return to the ED immediately if they change their mind at any time, or if their condition begins to change or worsen.      Discussion With Other Professionals:  Consultation with Dr West.    1. Slurring of speech      None    Please see ED course for more details:    ED Course as of 01/19/25 0156   Sun Jan 19, 2025   0037 EKG:  This EKG is signed by emergency department physician.    Rate: 56  Qtc: 430ms  Rhythm: Sinus bradycardia  Interpretation: Sinus bradycardia, no ST elevation limited by artifact  Comparison: stable as compared to patient's most recent EKG      [TC]   0040 Spoke Robert West telestroke.  Case discussed  Agrees to hold off on TNK.  He will review images. [TC]   0120 Spoke Robert West, telestroke.  States that he agrees with aspirin he wants to hold off on Plavix at this time  He agrees that patient needs an MRI but does not need any emergent treatment.  He states that patient can stay and get MRI on Monday at our facility. [TC]      ED Course User Index  [TC] Lilo Zazueta MD       Medications   iopamidol (ISOVUE-370) 76 % injection 75 mL (75 mLs IntraVENous Given 1/19/25 0045)   sodium chloride 0.9 % bolus 1,000 mL (1,000 mLs IntraVENous New Bag 1/19/25 0053)

## (undated) DEVICE — GOWN,SIRUS,NON REINFRCD,LARGE,SET IN SL: Brand: MEDLINE

## (undated) DEVICE — CATHETER IV 18GA L1.16IN OD1.27-1.3462MM ID0.9398-1.016MM

## (undated) DEVICE — STRIP,CLOSURE,WOUND,MEDI-STRIP,1/2X4: Brand: MEDLINE

## (undated) DEVICE — AGENT HEMOSTATIC SURGIFLOW MATRIX KIT W/THROMBIN

## (undated) DEVICE — TUBING, SUCTION, 1/4" X 20', STRAIGHT: Brand: MEDLINE INDUSTRIES, INC.

## (undated) DEVICE — BLADE,CARBON-STEEL,11,STRL,DISPOSABLE,TB: Brand: MEDLINE

## (undated) DEVICE — 4.0MM PRECISION ROUND

## (undated) DEVICE — SUTURE MONOCRYL SZ 4-0 L27IN ABSRB UD L19MM PS-2 1/2 CIR PRIM Y426H

## (undated) DEVICE — GLOVE SURG SZ 65 THK91MIL LTX FREE SYN POLYISOPRENE

## (undated) DEVICE — DRAIN SURG FLAT W7MMXL20CM FULL PERF

## (undated) DEVICE — SPONGE GZ W4XL4IN COT 12 PLY TYP VII WVN C FLD DSGN STERILE

## (undated) DEVICE — EVACUATOR SURG 100CC SIL BLB SUCT RESVR FOR CLS WND DRNGE

## (undated) DEVICE — SYRINGE MED 10ML LUERLOCK TIP W/O SFTY DISP

## (undated) DEVICE — GLOVE SURG SZ 85 L12IN FNGR ORTHO 126MIL CRM LTX FREE

## (undated) DEVICE — Device